# Patient Record
Sex: FEMALE | Race: BLACK OR AFRICAN AMERICAN | NOT HISPANIC OR LATINO | Employment: FULL TIME | URBAN - METROPOLITAN AREA
[De-identification: names, ages, dates, MRNs, and addresses within clinical notes are randomized per-mention and may not be internally consistent; named-entity substitution may affect disease eponyms.]

---

## 2017-02-01 ENCOUNTER — PATIENT MESSAGE (OUTPATIENT)
Dept: FAMILY MEDICINE | Facility: CLINIC | Age: 22
End: 2017-02-01

## 2017-02-07 ENCOUNTER — DOCUMENTATION ONLY (OUTPATIENT)
Dept: FAMILY MEDICINE | Facility: CLINIC | Age: 22
End: 2017-02-07

## 2017-02-07 NOTE — PROGRESS NOTES
Pre-Visit Chart Review  For Appointment Scheduled on 2/8/17    Health Maintenance Due   Topic Date Due    Lipid Panel  1996    HPV Vaccines (1 of 3 - Female 3 Dose Series) 09/06/2007    Influenza Vaccine  08/01/2016

## 2017-02-08 ENCOUNTER — OFFICE VISIT (OUTPATIENT)
Dept: FAMILY MEDICINE | Facility: CLINIC | Age: 22
End: 2017-02-08
Payer: COMMERCIAL

## 2017-02-08 VITALS
SYSTOLIC BLOOD PRESSURE: 107 MMHG | HEART RATE: 102 BPM | DIASTOLIC BLOOD PRESSURE: 67 MMHG | WEIGHT: 178.38 LBS | OXYGEN SATURATION: 98 % | HEIGHT: 65 IN | TEMPERATURE: 99 F | BODY MASS INDEX: 29.72 KG/M2

## 2017-02-08 DIAGNOSIS — R51.9 FREQUENT HEADACHES: ICD-10-CM

## 2017-02-08 DIAGNOSIS — F32.A DEPRESSION, UNSPECIFIED DEPRESSION TYPE: Primary | ICD-10-CM

## 2017-02-08 PROCEDURE — 99999 PR PBB SHADOW E&M-EST. PATIENT-LVL III: CPT | Mod: PBBFAC,,, | Performed by: PHYSICIAN ASSISTANT

## 2017-02-08 PROCEDURE — 99213 OFFICE O/P EST LOW 20 MIN: CPT | Mod: S$GLB,,, | Performed by: PHYSICIAN ASSISTANT

## 2017-02-08 RX ORDER — IBUPROFEN 800 MG/1
800 TABLET ORAL DAILY PRN
Qty: 30 TABLET | Refills: 0 | Status: ON HOLD | OUTPATIENT
Start: 2017-02-08 | End: 2019-08-20

## 2017-02-08 RX ORDER — SERTRALINE HYDROCHLORIDE 50 MG/1
50 TABLET, FILM COATED ORAL DAILY
Refills: 0 | COMMUNITY
Start: 2016-11-23 | End: 2017-02-08 | Stop reason: ALTCHOICE

## 2017-02-08 RX ORDER — BUPROPION HYDROCHLORIDE 150 MG/1
150 TABLET ORAL DAILY
Qty: 30 TABLET | Refills: 5 | Status: SHIPPED | OUTPATIENT
Start: 2017-02-08 | End: 2018-04-10

## 2017-02-08 NOTE — PROGRESS NOTES
Subjective:       Patient ID: Albina Beckham is a 21 y.o. female.    Chief Complaint: medication concern    HPI   Patient is a 21 year old AA female presenting to the clinic for f/u depression. Patient reports going to [facility] ER 11/2016 with concern of depression after breaking up with her boyfriend. She reports since she told the nurse she had a history of drug use, she was PEC'd to inpatient behavoiral at [facility]. She was placed on sertraline 50mg daily for depression. She was discharged in stable condition. She feels as though the sertraline 50mg is causing her to have insomnia. She is inquiring about switching to something else. She does feel like it helped her emotions & irritability. She also admits to headaches approximately 2x weekly occurring over temporal area either right or left sided. Usually lasting approximatly 1 hours & relieved with ibuprofen 800. She denies any blurry vision, chest pain, vomiting, sinus congestion associated with the headaches. She reports history of migraines as a child that were much worse. Her mother also has migraines.  Review of Systems   Constitutional: Positive for fatigue. Negative for activity change, appetite change, chills, diaphoresis and fever.   HENT: Negative for congestion, postnasal drip and rhinorrhea.    Respiratory: Negative.  Negative for cough, shortness of breath and wheezing.    Cardiovascular: Negative.  Negative for chest pain.   Gastrointestinal: Negative for abdominal pain, blood in stool, constipation, diarrhea, nausea and vomiting.   Genitourinary: Negative for dysuria, frequency, hematuria and urgency.   Musculoskeletal: Negative.    Skin: Negative.  Negative for color change and rash.   Neurological: Positive for headaches. Negative for dizziness and syncope.   Psychiatric/Behavioral: Negative for agitation, behavioral problems, confusion, decreased concentration, dysphoric mood and suicidal ideas.       Objective:      Physical  Exam   Constitutional: Vital signs are normal. She appears well-developed and well-nourished. No distress.   Cardiovascular: Normal rate, regular rhythm, S1 normal, S2 normal and normal heart sounds.  Exam reveals no gallop.    No murmur heard.  Pulses:       Radial pulses are 2+ on the right side, and 2+ on the left side.   <2sec cap refill fingers bilat     Pulmonary/Chest: Effort normal and breath sounds normal. No respiratory distress. She has no wheezes. She has no rhonchi.   Skin: Skin is warm and dry. She is not diaphoretic.   Appropriate skin turgor   Psychiatric: She has a normal mood and affect. Her speech is normal and behavior is normal. Judgment and thought content normal. Cognition and memory are normal.       Assessment:       1. Depression, unspecified depression type    2. Frequent headaches        Plan:   Albina was seen today for medication concern.    Diagnoses and all orders for this visit:    Depression, unspecified depression type  Sertraline more related to insomnia, but still wants to change  Wean off zoloft- take 25mg x 4 days; then start wellbutrin xl 150mg daily  -     buPROPion (WELLBUTRIN XL) 150 MG TB24 tablet; Take 1 tablet (150 mg total) by mouth once daily.  3-4 week f/u scheduled    Frequent headaches  Waxing and wnaing; patient alerted to let us know if symptoms worsen, new symptoms develop  -     ibuprofen (ADVIL,MOTRIN) 800 MG tablet; Take 1 tablet (800 mg total) by mouth daily as needed for Pain (headaches).  Return to clinic if symptoms worsen or do not improve with treatment

## 2017-02-08 NOTE — MR AVS SNAPSHOT
[facility]  [address] [place] [address]  Phone: [phone]  Fax: [phone]                  Albina Beckham   2017 4:40 PM   Office Visit    Description:  Female : 1995   Provider:  SANFORD Cruz   Department:  [facility]           Reason for Visit     medication concern           Diagnoses this Visit        Comments    Depression, unspecified depression type    -  Primary            To Do List           Future Appointments        Provider Department Dept Phone    2017 7:30 AM Wisam Lou MD [facility] [phone]      Goals (5 Years of Data)     None      Follow-Up and Disposition     Return for 3 week f/u depression.       These Medications        Disp Refills Start End    buPROPion (WELLBUTRIN XL) 150 MG TB24 tablet 30 tablet 5 2017    Take 1 tablet (150 mg total) by mouth once daily. - Oral    Pharmacy: [facility]/pharmacy #[id number] - [place] - [address]  Ph #: [phone]       ibuprofen (ADVIL,MOTRIN) 800 MG tablet 30 tablet 0 2017     Take 1 tablet (800 mg total) by mouth daily as needed for Pain (headaches). - Oral    Pharmacy: [facility]/pharmacy #[id number] - [place] - [address]  Ph #: [phone]         [facility]     [facility] On Call Nurse Care Line -  Assistance  Registered nurses in the [facility] On Call Center provide clinical advisement, health education, appointment booking, and other advisory services.  Call for this free service at [phone].             Medications           Message regarding Medications     Verify the changes and/or additions to your medication regime listed below are the same as discussed with your clinician today.  If any of these changes or additions are incorrect, please notify your healthcare provider.        START taking these NEW medications        Refills    buPROPion (WELLBUTRIN XL) 150 MG TB24 tablet 5    Sig: Take 1 tablet (150 mg total) by  "mouth once daily.    Class: Normal    Route: Oral    ibuprofen (ADVIL,MOTRIN) 800 MG tablet 0    Sig: Take 1 tablet (800 mg total) by mouth daily as needed for Pain (headaches).    Class: Normal    Route: Oral      STOP taking these medications     trazodone (DESYREL) 50 MG tablet Take 1 tablet (50 mg total) by mouth nightly as needed for Insomnia.    sulindac (CLINORIL) 200 MG Tab TAKE 1 TABLET BY MOUTH TWICE A DAY AFTER MEALS AS NEEDED    butalbital-acetaminophen-caffeine -40 mg (FIORICET, ESGIC) -40 mg per tablet     sertraline (ZOLOFT) 50 MG tablet Take 50 mg by mouth once daily.    predniSONE (DELTASONE) 20 MG tablet            Verify that the below list of medications is an accurate representation of the medications you are currently taking.  If none reported, the list may be blank. If incorrect, please contact your healthcare provider. Carry this list with you in case of emergency.           Current Medications     epinephrine (EPIPEN 2-KIMANI) 0.3 mg/0.3 mL AtIn Inject Epipen as directed.    hydroxychloroquine (PLAQUENIL) 200 mg tablet Take 200 mg by mouth once daily.    norethindrone-ethinyl estradiol (MICROGESTIN 1/20) 1-20 mg-mcg per tablet Take 1 tablet by mouth once daily.    tramadol (ULTRAM) 50 mg tablet Take 50 mg by mouth every 4 (four) hours as needed. for pain.    buPROPion (WELLBUTRIN XL) 150 MG TB24 tablet Take 1 tablet (150 mg total) by mouth once daily.    ibuprofen (ADVIL,MOTRIN) 800 MG tablet Take 1 tablet (800 mg total) by mouth daily as needed for Pain (headaches).           Clinical Reference Information           Your Vitals Were     BP Pulse Temp Height Weight SpO2    107/67 (BP Location: Right arm, Patient Position: Sitting, BP Method: Automatic) 102 98.7 °F (37.1 °C) (Oral) 5' 5" (1.651 m) 80.9 kg (178 lb 5.6 oz) 98%    BMI                29.68 kg/m2          Blood Pressure          Most Recent Value    BP  107/67      Allergies as of 2/8/2017     Ciprofloxacin    "   Immunizations Administered on Date of Encounter - 2/8/2017     None      Language Assistance Services     ATTENTION: Language assistance services are available, free of charge. Please call [phone].      ATENCIÓN: Si habjb cardoza, tiene a schmitt disposición servicios gratuitos de asistencia lingüística. Llame al [phone].     CHÚ Ý: N?u b?n nói Ti?ng Vi?t, có các d?ch v? h? tr? ngôn ng? mi?n phí dành cho b?n. G?i s? [phone].         [facility] complies with applicable Federal civil rights laws and does not discriminate on the basis of race, color, national origin, age, disability, or sex.

## 2017-02-10 ENCOUNTER — PATIENT MESSAGE (OUTPATIENT)
Dept: FAMILY MEDICINE | Facility: CLINIC | Age: 22
End: 2017-02-10

## 2017-02-12 DIAGNOSIS — G43.709 CHRONIC MIGRAINE WITHOUT AURA WITHOUT STATUS MIGRAINOSUS, NOT INTRACTABLE: Primary | ICD-10-CM

## 2017-02-19 ENCOUNTER — PATIENT MESSAGE (OUTPATIENT)
Dept: FAMILY MEDICINE | Facility: CLINIC | Age: 22
End: 2017-02-19

## 2017-02-20 ENCOUNTER — PATIENT MESSAGE (OUTPATIENT)
Dept: FAMILY MEDICINE | Facility: CLINIC | Age: 22
End: 2017-02-20

## 2017-02-20 NOTE — TELEPHONE ENCOUNTER
Please make patient ER f/u apt & inform her that she cannot go to the gym at this time.     Also, we will need [facility] records.

## 2017-02-20 NOTE — TELEPHONE ENCOUNTER
My chart message to pt. Pt already scheduled to see Ms. Sonja PA-C on 3/1/17. Called for [facility] records. Thanks, Julisa

## 2017-02-21 NOTE — TELEPHONE ENCOUNTER
Please inform patient I do not have any spots open Wednesday. She can see another provider if she needs to be seen that day. It would be an ER follow-up.

## 2017-02-24 ENCOUNTER — DOCUMENTATION ONLY (OUTPATIENT)
Dept: FAMILY MEDICINE | Facility: CLINIC | Age: 22
End: 2017-02-24

## 2017-02-24 NOTE — PROGRESS NOTES
Pre-Visit Chart Review  For Appointment Scheduled on 3/1/17      Health Maintenance Due   Topic Date Due    Lipid Panel  1996    HPV Vaccines (1 of 3 - Female 3 Dose Series) 09/06/2007    Influenza Vaccine  08/01/2016

## 2017-03-01 ENCOUNTER — OFFICE VISIT (OUTPATIENT)
Dept: FAMILY MEDICINE | Facility: CLINIC | Age: 22
End: 2017-03-01
Payer: COMMERCIAL

## 2017-03-01 ENCOUNTER — LAB VISIT (OUTPATIENT)
Dept: LAB | Facility: HOSPITAL | Age: 22
End: 2017-03-01
Attending: FAMILY MEDICINE
Payer: COMMERCIAL

## 2017-03-01 VITALS
SYSTOLIC BLOOD PRESSURE: 119 MMHG | DIASTOLIC BLOOD PRESSURE: 79 MMHG | HEIGHT: 65 IN | WEIGHT: 177.25 LBS | BODY MASS INDEX: 29.53 KG/M2 | HEART RATE: 80 BPM | TEMPERATURE: 98 F

## 2017-03-01 DIAGNOSIS — B27.90 MONONUCLEOSIS: Primary | ICD-10-CM

## 2017-03-01 DIAGNOSIS — R21 RASH: ICD-10-CM

## 2017-03-01 DIAGNOSIS — B27.90 MONONUCLEOSIS: ICD-10-CM

## 2017-03-01 LAB
ALBUMIN SERPL BCP-MCNC: 3 G/DL
ALP SERPL-CCNC: 88 U/L
ALT SERPL W/O P-5'-P-CCNC: 67 U/L
ANION GAP SERPL CALC-SCNC: 9 MMOL/L
AST SERPL-CCNC: 52 U/L
BASOPHILS # BLD AUTO: 0.07 K/UL
BASOPHILS NFR BLD: 1.4 %
BILIRUB SERPL-MCNC: 0.2 MG/DL
BUN SERPL-MCNC: 8 MG/DL
CALCIUM SERPL-MCNC: 9 MG/DL
CHLORIDE SERPL-SCNC: 103 MMOL/L
CO2 SERPL-SCNC: 22 MMOL/L
CREAT SERPL-MCNC: 0.7 MG/DL
DIFFERENTIAL METHOD: ABNORMAL
EOSINOPHIL # BLD AUTO: 0 K/UL
EOSINOPHIL NFR BLD: 0 %
ERYTHROCYTE [DISTWIDTH] IN BLOOD BY AUTOMATED COUNT: 13 %
EST. GFR  (AFRICAN AMERICAN): >60 ML/MIN/1.73 M^2
EST. GFR  (NON AFRICAN AMERICAN): >60 ML/MIN/1.73 M^2
GLUCOSE SERPL-MCNC: 79 MG/DL
HCT VFR BLD AUTO: 37.8 %
HGB BLD-MCNC: 12.6 G/DL
LYMPHOCYTES # BLD AUTO: 4.2 K/UL
LYMPHOCYTES NFR BLD: 81.2 %
MCH RBC QN AUTO: 28.4 PG
MCHC RBC AUTO-ENTMCNC: 33.3 %
MCV RBC AUTO: 85 FL
MONOCYTES # BLD AUTO: 0.5 K/UL
MONOCYTES NFR BLD: 8.9 %
NEUTROPHILS # BLD AUTO: 0.4 K/UL
NEUTROPHILS NFR BLD: 8.5 %
PLATELET # BLD AUTO: 329 K/UL
PMV BLD AUTO: 9.7 FL
POTASSIUM SERPL-SCNC: 3.9 MMOL/L
PROT SERPL-MCNC: 9.1 G/DL
RBC # BLD AUTO: 4.43 M/UL
SODIUM SERPL-SCNC: 134 MMOL/L
WBC # BLD AUTO: 5.17 K/UL

## 2017-03-01 PROCEDURE — 99213 OFFICE O/P EST LOW 20 MIN: CPT | Mod: 25,S$GLB,, | Performed by: PHYSICIAN ASSISTANT

## 2017-03-01 PROCEDURE — 85025 COMPLETE CBC W/AUTO DIFF WBC: CPT

## 2017-03-01 PROCEDURE — 99999 PR PBB SHADOW E&M-EST. PATIENT-LVL III: CPT | Mod: PBBFAC,,, | Performed by: PHYSICIAN ASSISTANT

## 2017-03-01 PROCEDURE — 96372 THER/PROPH/DIAG INJ SC/IM: CPT | Mod: S$GLB,,, | Performed by: FAMILY MEDICINE

## 2017-03-01 PROCEDURE — 80053 COMPREHEN METABOLIC PANEL: CPT

## 2017-03-01 PROCEDURE — 1160F RVW MEDS BY RX/DR IN RCRD: CPT | Mod: S$GLB,,, | Performed by: PHYSICIAN ASSISTANT

## 2017-03-01 PROCEDURE — 86665 EPSTEIN-BARR CAPSID VCA: CPT

## 2017-03-01 PROCEDURE — 36415 COLL VENOUS BLD VENIPUNCTURE: CPT | Mod: PO

## 2017-03-01 RX ORDER — BETAMETHASONE SODIUM PHOSPHATE AND BETAMETHASONE ACETATE 3; 3 MG/ML; MG/ML
6 INJECTION, SUSPENSION INTRA-ARTICULAR; INTRALESIONAL; INTRAMUSCULAR; SOFT TISSUE
Status: COMPLETED | OUTPATIENT
Start: 2017-03-01 | End: 2017-03-01

## 2017-03-01 RX ADMIN — BETAMETHASONE SODIUM PHOSPHATE AND BETAMETHASONE ACETATE 6 MG: 3; 3 INJECTION, SUSPENSION INTRA-ARTICULAR; INTRALESIONAL; INTRAMUSCULAR; SOFT TISSUE at 05:03

## 2017-03-01 NOTE — PROGRESS NOTES
2 identifiers, name and , used to confirm patient identity.  Procedure was explained and patient verbalized understanding. Celestone 6 mg given IM in the left upper outer quadrant of the gluteus using sterile technique. Patient tolerated procedure well. No residual bleeding noted at the injection site.

## 2017-03-01 NOTE — MR AVS SNAPSHOT
[facility]  [address] [place] [address]  Phone: [phone]  Fax: [phone]                  Albina Beckham   3/1/2017 4:00 PM   Office Visit    Description:  Female : 1995   Provider:  SANFORD Cruz   Department:  [facility]           Reason for Visit     Mononucleosis           Diagnoses this Visit        Comments    Mononucleosis    -  Primary     Rash                To Do List           Future Appointments        Provider Department Dept Phone    2017 7:30 AM Wisam Lou MD [facility] [phone]      Goals (5 Years of Data)     None      [facility]     [facility] On Call Nurse [facility] Line -  Assistance  Registered nurses in the [facility] On Call Center provide clinical advisement, health education, appointment booking, and other advisory services.  Call for this free service at [phone].             Medications           Message regarding Medications     Verify the changes and/or additions to your medication regime listed below are the same as discussed with your clinician today.  If any of these changes or additions are incorrect, please notify your healthcare provider.        These medications were administered today        Dose Freq    betamethasone acetate-betamethasone sodium phosphate injection 6 mg 6 mg Clinic/HOD 1 time    Sig: Inject 1 mL (6 mg total) into the muscle one time.    Class: Normal    Route: Intramuscular    Cosign for Ordering: Accepted by Cris Motley MD on 3/1/2017  4:30 PM      STOP taking these medications     hydrocodone-acetaminophen 5-325mg (NORCO) 5-325 mg per tablet Take 1 tablet by mouth every 6 (six) hours as needed for Pain.           Verify that the below list of medications is an accurate representation of the medications you are currently taking.  If none reported, the list may be blank. If incorrect, please contact your healthcare provider. Carry this list with you in  case of emergency.           Current Medications     buPROPion (WELLBUTRIN XL) 150 MG TB24 tablet Take 1 tablet (150 mg total) by mouth once daily.    epinephrine (EPIPEN 2-KIMANI) 0.3 mg/0.3 mL AtIn Inject Epipen as directed.    hydroxychloroquine (PLAQUENIL) 200 mg tablet Take 200 mg by mouth once daily.    ibuprofen (ADVIL,MOTRIN) 800 MG tablet Take 1 tablet (800 mg total) by mouth daily as needed for Pain (headaches).    norethindrone-ethinyl estradiol (MICROGESTIN 1/20) 1-20 mg-mcg per tablet Take 1 tablet by mouth once daily.    tramadol (ULTRAM) 50 mg tablet Take 50 mg by mouth every 4 (four) hours as needed. for pain.           Clinical Reference Information           Your Vitals Were     BP                   119/79 (BP Location: Right arm, Patient Position: Sitting, BP Method: Automatic)           Blood Pressure          Most Recent Value    BP  119/79      Allergies as of 3/1/2017     Ciprofloxacin      Immunizations Administered on Date of Encounter - 3/1/2017     None      Orders Placed During Today's Visit     Future Labs/Procedures Expected by Expires    CBC W/ AUTO DIFFERENTIAL  3/1/2017 4/30/2018    Comprehensive metabolic panel  3/1/2017 4/30/2018    Pavithra-Barr Virus antibody panel  3/1/2017 4/30/2018      Language Assistance Services     ATTENTION: Language assistance services are available, free of charge. Please call [phone].      ATENCIÓN: Si habla sony, tiene a schmitt disposición servicios gratuitos de asistencia lingüística. Llame al [phone].     CHÚ Ý: N?u b?n nói Ti?ng Vi?t, có các d?ch v? h? tr? ngôn ng? mi?n phí dành cho b?n. G?i s? [phone].         [facility] complies with applicable Federal civil rights laws and does not discriminate on the basis of race, color, national origin, age, disability, or sex.

## 2017-03-07 NOTE — PROGRESS NOTES
Subjective:       Patient ID: Albina Beckham is a 21 y.o. female.    Chief Complaint: Mononucleosis    HPI      Patient is a 21 year old AA female presenting to the clinic for ER f/u after being diagnosed with mononucleosis. Patient presented to [facility] on 2/15/17 with flu like symptoms. She had been to our ER with similar symptoms- 2/13/17 and diagnosed with sinusitis where she was given Augmentin. Patient reported no improvement in symptoms & persistent headache at [facility]. Other associated symptoms include increased fatigue & palpable cervical lymphadenopathy. Mono spot found to be positive. Patient placed on oral prednisone. WBC slightly elevated at 10.5 with left shift. She also had elevated liver enzymes 94, 95 & alk phos 173. Preg test negative. Normal CXR & CT head. She was told to avoid contact sports, gym until spleen evaluated. She denies any fever, chills, abdominal pain. She feels much improved than previously.  Review of Systems   Constitutional: Positive for activity change, appetite change and fatigue. Negative for chills, diaphoresis and fever.   HENT: Negative for congestion, postnasal drip, rhinorrhea, sore throat, trouble swallowing and voice change.    Respiratory: Negative.  Negative for cough, shortness of breath and wheezing.    Cardiovascular: Negative.  Negative for chest pain.   Gastrointestinal: Negative for abdominal pain, blood in stool, constipation, diarrhea, nausea and vomiting.   Genitourinary: Negative for dysuria, frequency, hematuria and urgency.   Musculoskeletal: Negative.    Skin: Negative.  Negative for color change and rash.   Neurological: Positive for headaches. Negative for dizziness and syncope.   Psychiatric/Behavioral: Negative for agitation, behavioral problems and confusion.       Objective:      Physical Exam   Constitutional: Vital signs are normal. She appears well-developed and well-nourished. No distress.   HENT:   Head: Normocephalic and atraumatic.   Right Ear:  Hearing, tympanic membrane, external ear and ear canal normal.   Left Ear: Hearing, tympanic membrane, external ear and ear canal normal.   Nose: Nose normal.   Mouth/Throat: Uvula is midline and oropharynx is clear and moist.   Cardiovascular: Normal rate, regular rhythm, S1 normal, S2 normal and normal heart sounds.  Exam reveals no gallop.    No murmur heard.  Pulses:       Radial pulses are 2+ on the right side, and 2+ on the left side.   <2sec cap refill fingers bilat     Pulmonary/Chest: Effort normal and breath sounds normal. No respiratory distress. She has no wheezes. She has no rhonchi.   Abdominal: Normal appearance. There is no tenderness. There is no rigidity, no guarding, no tenderness at McBurney's point and negative Joseph's sign.   Skin: Skin is warm and dry. She is not diaphoretic.   Appropriate skin turgor   Psychiatric: She has a normal mood and affect. Her speech is normal and behavior is normal. Judgment and thought content normal. Cognition and memory are normal.       Assessment:       1. Mononucleosis    2. Rash        Plan:   Albina was seen today for mononucleosis.    Diagnoses and all orders for this visit:    Mononucleosis  -     Cancel: US Abdomen Complete; Future  -     Pavithra-Barr Virus antibody panel; Future  -     Comprehensive metabolic panel; Future  -     CBC W/ AUTO DIFFERENTIAL; Future    Rash  -     betamethasone acetate-betamethasone sodium phosphate injection 6 mg; Inject 1 mL (6 mg total) into the muscle one time.

## 2017-03-10 ENCOUNTER — TELEPHONE (OUTPATIENT)
Dept: NEUROLOGY | Facility: CLINIC | Age: 22
End: 2017-03-10

## 2017-03-13 LAB
EBV EA IGG SER QL IF: ABNORMAL TITER
EBV NA IGG SER IA-ACNC: ABNORMAL TITER
EBV VCA IGG SER IA-ACNC: ABNORMAL TITER
EBV VCA IGM SER IA-ACNC: ABNORMAL TITER

## 2017-03-14 ENCOUNTER — PATIENT MESSAGE (OUTPATIENT)
Dept: FAMILY MEDICINE | Facility: CLINIC | Age: 22
End: 2017-03-14

## 2017-03-14 DIAGNOSIS — B27.90 EPSTEIN BARR INFECTION: Primary | ICD-10-CM

## 2017-03-14 DIAGNOSIS — R74.8 ELEVATED LIVER ENZYMES: ICD-10-CM

## 2017-03-15 ENCOUNTER — HISTORICAL (OUTPATIENT)
Dept: ADMINISTRATIVE | Facility: HOSPITAL | Age: 22
End: 2017-03-15

## 2017-03-15 LAB — CRP SERPL-MCNC: 0.11 MG/DL (ref 0–1.4)

## 2017-03-17 LAB
ANA SER-ACNC: NEGATIVE
C3 SERPL-MCNC: 143 MG/DL (ref 82–167)
C4 NEF SERPL-MCNC: 18 MG/DL (ref 14–44)

## 2017-03-18 LAB
CARDIOLIPIN IGA SER IA-ACNC: <9 APL U/ML (ref 0–11)
CARDIOLIPIN IGG SER IA-ACNC: 12 GPL U/ML (ref 0–14)
CARDIOLIPIN IGM SER IA-ACNC: <9 MPL U/ML (ref 0–12)

## 2017-03-19 LAB
B2 GLYCOPROTEIN I IGA: <9 GPI IGA UNITS (ref 0–25)
B2 GLYCOPROTEIN I IGG: <9 GPI IGG UNITS (ref 0–20)
B2 GLYCOPROTEIN I IGM: <9 GPI IGM UNITS (ref 0–32)

## 2017-03-24 ENCOUNTER — DOCUMENTATION ONLY (OUTPATIENT)
Dept: FAMILY MEDICINE | Facility: CLINIC | Age: 22
End: 2017-03-24

## 2017-03-24 NOTE — PROGRESS NOTES
Pre-Visit Chart Review  For Appointment Scheduled on 4/4/17    Health Maintenance Due   Topic Date Due    Lipid Panel  1996    HPV Vaccines (1 of 3 - Female 3 Dose Series) 09/06/2007    Influenza Vaccine  08/01/2016

## 2017-06-13 ENCOUNTER — TELEPHONE (OUTPATIENT)
Dept: FAMILY MEDICINE | Facility: CLINIC | Age: 22
End: 2017-06-13

## 2017-06-13 NOTE — TELEPHONE ENCOUNTER
Advised caller that [facility] does not have any availabilities at this time, but  has several openings. She will call back to schedule.

## 2017-06-13 NOTE — TELEPHONE ENCOUNTER
----- Message from Vinod MOCTEZUMA Frisard sent at 6/13/2017  2:05 PM CDT -----  Contact: Mother/Felipa Leo called in regarding the attached patient (dtrKelly) and wanted to see if patient could be squeezed in this Friday 6/16/17.  Patient has a bad cough & congestion.    Felipa's call back number is [phone]

## 2017-06-13 NOTE — TELEPHONE ENCOUNTER
----- Message from Claudette Cornelius sent at 6/13/2017  8:26 AM CDT -----  Contact: mother, livan   Wants to be seen today   Cough and congestion   Call back

## 2017-06-13 NOTE — TELEPHONE ENCOUNTER
----- Message from Mona Rogers sent at 6/13/2017 11:57 AM CDT -----  Contact: mother Felipa  Patient's mother Felipa is returning a call to [place]    Please call her at    Thanks

## 2017-10-16 ENCOUNTER — OFFICE VISIT (OUTPATIENT)
Dept: ORTHOPEDICS | Facility: CLINIC | Age: 22
End: 2017-10-16
Payer: COMMERCIAL

## 2017-10-16 VITALS — HEIGHT: 64 IN | WEIGHT: 168 LBS | BODY MASS INDEX: 28.68 KG/M2

## 2017-10-16 DIAGNOSIS — M25.561 CHRONIC PAIN OF BOTH KNEES: Primary | ICD-10-CM

## 2017-10-16 DIAGNOSIS — G89.29 CHRONIC PAIN OF BOTH KNEES: Primary | ICD-10-CM

## 2017-10-16 DIAGNOSIS — M25.562 CHRONIC PAIN OF BOTH KNEES: Primary | ICD-10-CM

## 2017-10-16 PROCEDURE — 97760 ORTHOTIC MGMT&TRAING 1ST ENC: CPT | Mod: S$GLB,,, | Performed by: ORTHOPAEDIC SURGERY

## 2017-10-16 PROCEDURE — 99999 PR PBB SHADOW E&M-EST. PATIENT-LVL II: CPT | Mod: PBBFAC,,, | Performed by: ORTHOPAEDIC SURGERY

## 2017-10-16 PROCEDURE — 99213 OFFICE O/P EST LOW 20 MIN: CPT | Mod: 25,S$GLB,, | Performed by: ORTHOPAEDIC SURGERY

## 2017-10-16 NOTE — PROGRESS NOTES
HISTORY OF PRESENT ILLNESS:  Albina Beckham, 22 years old, complaining of   bilateral knee pain for about a week's time.  Started running about a month ago.    Anterior type knee pain.  Again, she has a history of MPFL reconstruction   several years ago.    Exam shows positive patellar crunch.  Negative patellar apprehension.  Skin is   intact.  Compartments are soft.    ASSESSMENT:  Patellofemoral pain.    PLAN:  Quad strengthening and hamstring stretching.  We will get her fitted with   bilateral anterior knee pain type braces.  Follow up as needed.      WALKER/ZAIRA  dd: 10/16/2017 10:13:51 (CDT)  td: 10/16/2017 18:17:58 (CDT)  Doc ID   #[id number]  Job ID #[id number]    CC:     We performed a custom orthotic/brace fitting, adjusting and training with the patient. The patient demonstrated understanding and proper care. This was performed for 15 minutes.

## 2017-11-13 RX ORDER — TRAZODONE HYDROCHLORIDE 50 MG/1
TABLET ORAL
Qty: 30 TABLET | Refills: 0 | Status: SHIPPED | OUTPATIENT
Start: 2017-11-13 | End: 2018-11-07 | Stop reason: ALTCHOICE

## 2017-12-25 ENCOUNTER — PATIENT MESSAGE (OUTPATIENT)
Dept: FAMILY MEDICINE | Facility: CLINIC | Age: 22
End: 2017-12-25

## 2017-12-27 ENCOUNTER — TELEPHONE (OUTPATIENT)
Dept: FAMILY MEDICINE | Facility: CLINIC | Age: 22
End: 2017-12-27

## 2017-12-27 NOTE — TELEPHONE ENCOUNTER
----- Message from Geovanna Rose sent at 12/27/2017 12:58 PM CST -----  Patients mom/Felipa states that patient need to see the doctor/NP today for possibly strep.  Please call mom at [phone].

## 2017-12-28 ENCOUNTER — OFFICE VISIT (OUTPATIENT)
Dept: FAMILY MEDICINE | Facility: CLINIC | Age: 22
End: 2017-12-28
Payer: COMMERCIAL

## 2017-12-28 VITALS
TEMPERATURE: 98 F | WEIGHT: 177.25 LBS | HEIGHT: 64 IN | DIASTOLIC BLOOD PRESSURE: 71 MMHG | RESPIRATION RATE: 18 BRPM | OXYGEN SATURATION: 98 % | SYSTOLIC BLOOD PRESSURE: 118 MMHG | HEART RATE: 85 BPM | BODY MASS INDEX: 30.26 KG/M2

## 2017-12-28 DIAGNOSIS — J03.90 EXUDATIVE TONSILLITIS: Primary | ICD-10-CM

## 2017-12-28 LAB
CTP QC/QA: YES
S PYO RRNA THROAT QL PROBE: NEGATIVE

## 2017-12-28 PROCEDURE — 99999 PR PBB SHADOW E&M-EST. PATIENT-LVL III: CPT | Mod: PBBFAC,,, | Performed by: FAMILY MEDICINE

## 2017-12-28 PROCEDURE — 99214 OFFICE O/P EST MOD 30 MIN: CPT | Mod: S$GLB,,, | Performed by: FAMILY MEDICINE

## 2017-12-28 PROCEDURE — 87880 STREP A ASSAY W/OPTIC: CPT | Mod: QW,S$GLB,, | Performed by: FAMILY MEDICINE

## 2017-12-28 RX ORDER — AZITHROMYCIN 250 MG/1
TABLET, FILM COATED ORAL
Qty: 6 TABLET | Refills: 0 | Status: SHIPPED | OUTPATIENT
Start: 2017-12-28 | End: 2018-03-09 | Stop reason: ALTCHOICE

## 2017-12-28 RX ORDER — DESOGESTREL AND ETHINYL ESTRADIOL 0.15-0.03
1 KIT ORAL DAILY
COMMUNITY
Start: 2017-12-20 | End: 2018-11-07 | Stop reason: ALTCHOICE

## 2017-12-28 RX ORDER — VALACYCLOVIR HYDROCHLORIDE 1 G/1
1 TABLET, FILM COATED ORAL DAILY PRN
COMMUNITY
Start: 2017-10-07 | End: 2019-08-08

## 2017-12-28 NOTE — PROGRESS NOTES
Subjective:       Patient ID: Albina Beckham is a 22 y.o. female.    Chief Complaint: Sore Throat (started 12/24/17 with sore throat, post nasal drip)    Has had 4 episodes of Strep over past 2 years.      Sore Throat    This is a new problem. Episode onset: 4 days. The problem has been waxing and waning. Neither side of throat is experiencing more pain than the other. The pain is moderate. Associated symptoms include ear pain. Pertinent negatives include no abdominal pain or shortness of breath. She has tried acetaminophen for the symptoms. The treatment provided mild relief.     Review of Systems   Constitutional: Negative for fever.   HENT: Positive for ear pain and sore throat.    Respiratory: Negative for shortness of breath.    Cardiovascular: Negative for chest pain.   Gastrointestinal: Negative for abdominal pain and nausea.   Skin: Negative for rash.   All other systems reviewed and are negative.      Objective:      Physical Exam   Constitutional: She appears well-developed. No distress.   HENT:   Right Ear: Tympanic membrane is not erythematous.   Left Ear: Tympanic membrane is not erythematous.   Nose: Mucosal edema present. Right sinus exhibits no maxillary sinus tenderness. Left sinus exhibits no maxillary sinus tenderness.   Mouth/Throat: Oropharyngeal exudate and posterior oropharyngeal erythema present. Tonsils are 2+ on the right. Tonsils are 2+ on the left. Tonsillar exudate.   Neck: Neck supple.   Cardiovascular: Normal rate and regular rhythm.    No murmur heard.  Pulmonary/Chest: Effort normal and breath sounds normal.   Lymphadenopathy:     She has cervical adenopathy.       Assessment:       1. Exudative tonsillitis        Plan:         Albina was seen today for sore throat.    Diagnoses and all orders for this visit:    Exudative tonsillitis  -     POCT Rapid Strep A    Other orders  -     azithromycin (Z-KIMANI) 250 MG tablet; As directed on pack

## 2018-02-07 ENCOUNTER — TELEPHONE (OUTPATIENT)
Dept: FAMILY MEDICINE | Facility: CLINIC | Age: 23
End: 2018-02-07

## 2018-02-07 NOTE — TELEPHONE ENCOUNTER
Is she having any fevers? Body aches?     If so, I will send in tamiflu & she will need rest, hydration. She can alterate tylenol and ibuprofen for fevers. She can take OTC cough/cold medication for symptoms.    If none of the above, she needs to be seen.

## 2018-02-07 NOTE — TELEPHONE ENCOUNTER
----- Message from Alisson Jackson sent at 2/7/2018 11:01 AM CST -----  Contact: Mother Felipa Moise  Patients mother is requesting same day appointment tomorrow.  Patient is coughing, sore throat, achy, nasal congestion,  all around not feeling well.  Please call back [phone].  Thank you!

## 2018-02-07 NOTE — TELEPHONE ENCOUNTER
Patient called with flu like symptoms.  Headache, body ache, sore throat and congested  Please advise

## 2018-02-08 ENCOUNTER — TELEPHONE (OUTPATIENT)
Dept: FAMILY MEDICINE | Facility: CLINIC | Age: 23
End: 2018-02-08

## 2018-02-08 NOTE — TELEPHONE ENCOUNTER
----- Message from Radha Bartlett sent at 2/8/2018  1:01 PM CST -----  Contact: mother, Felipa Beckham  Patient needs first available appointment due to head, nose throat and cough. Patient would like to come in on Friday after 2:00. Please call patient's mother, Felipa Beckham at [phone]. Thanks!

## 2018-02-08 NOTE — TELEPHONE ENCOUNTER
Patient's mom states she had to cancel her appt today to go into work.  Advised Yamil or Samantha Banegas, patient's mom refused.  She states she will go to urgent care

## 2018-02-22 ENCOUNTER — OFFICE VISIT (OUTPATIENT)
Dept: FAMILY MEDICINE | Facility: CLINIC | Age: 23
End: 2018-02-22
Payer: COMMERCIAL

## 2018-02-22 VITALS
HEART RATE: 76 BPM | WEIGHT: 185.19 LBS | TEMPERATURE: 98 F | BODY MASS INDEX: 31.62 KG/M2 | SYSTOLIC BLOOD PRESSURE: 110 MMHG | DIASTOLIC BLOOD PRESSURE: 73 MMHG | HEIGHT: 64 IN

## 2018-02-22 DIAGNOSIS — B37.2 MONILIASIS, CUTANEOUS: Primary | ICD-10-CM

## 2018-02-22 DIAGNOSIS — R63.5 ABNORMAL WEIGHT GAIN: ICD-10-CM

## 2018-02-22 PROCEDURE — 96372 THER/PROPH/DIAG INJ SC/IM: CPT | Mod: S$GLB,,, | Performed by: FAMILY MEDICINE

## 2018-02-22 PROCEDURE — 99999 PR PBB SHADOW E&M-EST. PATIENT-LVL III: CPT | Mod: PBBFAC,,, | Performed by: FAMILY MEDICINE

## 2018-02-22 PROCEDURE — 3008F BODY MASS INDEX DOCD: CPT | Mod: S$GLB,,, | Performed by: FAMILY MEDICINE

## 2018-02-22 PROCEDURE — 99214 OFFICE O/P EST MOD 30 MIN: CPT | Mod: 25,S$GLB,, | Performed by: FAMILY MEDICINE

## 2018-02-22 RX ORDER — CEFTRIAXONE 500 MG/1
500 INJECTION, POWDER, FOR SOLUTION INTRAMUSCULAR; INTRAVENOUS
Status: COMPLETED | OUTPATIENT
Start: 2018-02-22 | End: 2018-02-22

## 2018-02-22 RX ORDER — GABAPENTIN 100 MG/1
100 CAPSULE ORAL 3 TIMES DAILY
COMMUNITY
End: 2018-03-09 | Stop reason: ALTCHOICE

## 2018-02-22 RX ORDER — NYSTATIN AND TRIAMCINOLONE ACETONIDE 100000; 1 [USP'U]/G; MG/G
OINTMENT TOPICAL 2 TIMES DAILY
Qty: 60 G | Refills: 3 | Status: SHIPPED | OUTPATIENT
Start: 2018-02-22 | End: 2018-04-10

## 2018-02-22 RX ORDER — FLUCONAZOLE 100 MG/1
100 TABLET ORAL DAILY
Qty: 10 TABLET | Refills: 0 | Status: SHIPPED | OUTPATIENT
Start: 2018-02-22 | End: 2018-03-09 | Stop reason: ALTCHOICE

## 2018-02-22 RX ADMIN — CEFTRIAXONE 500 MG: 500 INJECTION, POWDER, FOR SOLUTION INTRAMUSCULAR; INTRAVENOUS at 05:02

## 2018-02-22 NOTE — PROGRESS NOTES
Subjective:       Patient ID: Albina Beckham is a 22 y.o. female.    Chief Complaint: Herpes Zoster    Rash   This is a new problem. The current episode started in the past 7 days. The problem has been gradually worsening since onset. The affected locations include the torso. The rash is characterized by blistering, burning, itchiness, pain, redness and scaling. She was exposed to nothing. Pertinent negatives include no anorexia, congestion, cough, diarrhea, facial edema, fatigue, fever, rhinorrhea or shortness of breath. Past treatments include antibiotics and anti-itch cream. The treatment provided no relief.     Review of Systems   Constitutional: Positive for unexpected weight change. Negative for fatigue and fever.        30 pounds increase.   HENT: Negative for congestion and rhinorrhea.    Respiratory: Negative for cough and shortness of breath.    Gastrointestinal: Negative for anorexia and diarrhea.   Genitourinary: Positive for frequency.        Nocturia.   Musculoskeletal: Positive for arthralgias, back pain and gait problem.   Skin: Positive for rash.   Psychiatric/Behavioral: Positive for behavioral problems.       There is no problem list on file for this patient.      Objective:      Physical Exam   Constitutional: She is oriented to person, place, and time. She appears well-developed and well-nourished.   Cardiovascular: Normal rate, regular rhythm and normal heart sounds.    Pulmonary/Chest: Effort normal and breath sounds normal. Right breast exhibits skin change.       Skin with erythema,wet, and moderate tenderness, Milk curds.   Genitourinary: There is breast tenderness.   Musculoskeletal: She exhibits no edema.   Neurological: She is alert and oriented to person, place, and time.   Skin: Skin is warm and dry. Rash noted. There is erythema.   Psychiatric: Her speech is normal. Judgment and thought content normal. Her mood appears anxious. She is agitated. Cognition and memory are normal.    Nursing note and vitals reviewed.      Lab Results   Component Value Date    WBC 5.17 03/01/2017    HGB 12.6 03/01/2017    HCT 37.8 03/01/2017     03/01/2017    ALT 67 (H) 03/01/2017    AST 52 (H) 03/01/2017     (L) 03/01/2017    K 3.9 03/01/2017     03/01/2017    CREATININE 0.7 03/01/2017    BUN 8 03/01/2017    CO2 22 (L) 03/01/2017     The ASCVD Risk score (Mike WILKERSNO Jr., et al., 2013) failed to calculate for the following reasons:    The 2013 ASCVD risk score is only valid for ages 40 to 79  Labs in Tue . Please evaluate for POC's, binge disorder, and hypometabolism. She may need Ideal protein and Metformin.  Assessment:       1. Moniliasis, cutaneous    2. Abnormal weight gain        Plan:       Moniliasis, cutaneous  -     fluconazole (DIFLUCAN) 100 MG tablet; Take 1 tablet (100 mg total) by mouth once daily.  Dispense: 10 tablet; Refill: 0  -     nystatin-triamcinolone (MYCOLOG) ointment; Apply topically 2 (two) times daily.  Dispense: 60 g; Refill: 3  -     Basic metabolic panel; Future; Expected date: 02/22/2018  -     cefTRIAXone injection 500 mg; Inject 0.5 g (500 mg total) into the muscle one time.    Abnormal weight gain  -     Basic metabolic panel; Future; Expected date: 02/22/2018  -     Hemoglobin A1c; Future; Expected date: 02/22/2018  -     TSH; Future; Expected date: 02/22/2018  -     Lipid panel; Future; Expected date: 02/22/2018    30-minute visit. 20 minutes spent counseling patient on diet, exercise, and weight loss.

## 2018-02-22 NOTE — PATIENT INSTRUCTIONS
When Your Teenager Has Polycystic Ovary Syndrome (PCOS)     A hormone imbalance can cause small, insignificant cysts to form in the ovaries.   Your daughter has been diagnosed with a condition called polycystic ovary syndrome (PCOS). PCOS is an imbalance of hormones. It affects the ovaries.  The ovaries are the organs that store a womans eggs. PCOS can also affect the rest of the body. PCOS can lead to serious health issues if not treated. Treatment cant cure the problem, but it helps reduce symptoms and prevent health problems.  What is PCOS?  Androgens are a type of hormone (chemical messenger in the body). They are often called male hormones, but women make and use some of these hormones also. Girls and women with PCOS usually have higher levels of androgens than normal. This can lead to changes in the body that include:  · Polycystic-appearing ovaries with many small, insignificant cysts   · Missed periods, irregular periods, or very light periods  · Increased body hair growth  · Weight gain  · Acne, oily skin, and dandruff  · Infertility  · Thickened or darkened skin patches     Medicines can help manage symptoms of PCOS.   Because of the hormone changes, women with PCOS are more likely to develop certain serious health problems. These include type 2 diabetes, high blood pressure, problems with the heart and blood vessels, and uterine cancer. Women with PCOS often have problems with their fertility. This is the ability to get pregnant.  What causes PCOS?  A girl may be more likely to get it if a parent or sibling has the condition. But the exact cause of PCOS is not known.  How is PCOS diagnosed?  There is no 1 test that can diagnose PCOS. A medical history, physical exam, and blood tests help give the diagnosis. A pelvic exam may be done. Other tests, such as a vaginal or pelvic ultrasound, may also be done.  How is PCOS treated?  Medicine is the most common treatment for PCOS. Medicines affect the  bodys hormone levels. The most common medicines used to treat PCOS include:  · Birth control pills (oral contraceptives). These contain a combination of female hormones. They can help bring hormones back into balance and reduce or eliminate symptoms. This reduces the risk for endometrial cancer later in life. (A teen does not have to be sexually active to take birth control pills.)  · Insulin-sensitizing medicines. These are used to treat diabetes and are frequently used in the treatment of PCOS. These medicines help the body respond to insulin. In women with PCOS, they can help decrease androgen levels and improve ovulation. Restoring ovulation helps make menstrual periods regular and more predictable.  · Metformin. This is a diabetes medicine that has been shown to help with PCOS symptoms.  · Antiandrogens. These are medicines that can help reverse the effects of male hormones. They help reduce hair growth and clear acne. They are often combined with birth control pills.  In addition to medicine, regular exercise and healthy eating can help manage PCOS. PCOS makes losing weight much harder. But losing even a little weight can help reduce some PCOS symptoms. Talk to your childs health care provider for more information on weight loss and PCOS.  Working with the health care provider  Since there is no cure for PCOS, its important to manage your childs condition. Keep in touch with your childs health care provider. Be honest with the health care provider about how the treatment is going and how your daughter is responding. Mention if you notice any new changes. And take your daughter for regular follow-up visits to ensure that any health problems are caught and managed.  Resources  · Polycystic Ovarian Syndrome Associationwww.pcosupport.org  · Girls Health.govwww.girlshealth.gov/parents/parentsbody/pcos_educators.cfm  · The Hormone Foundationwww.hormone.org/public/polycystic.cfm  · [facility] Womens  Healthwww.LookStat.OurStory   Date Last Reviewed: 5/9/2015  © 2589-2722 [employer]. [address] [place] [address], [place] [address]. All rights reserved. This information is not intended as a substitute for professional medical care. Always follow your healthcare professional's instructions.        Candida Skin Infection (Adult)  Candida is type of yeast. It grows naturally on the skin and in the mouth. If it grows out of control, it can cause an infection. Candida can cause infections in the genital area, skin folds, in the mouth, and under the breasts. Anyone can get this infection. It is more common in a person with a weak immune system, such as from diabetes, HIV, or cancer. Its also more common in someone who has been on antibiotic therapy. And its more common people who are overweight or who have incontinence. Wearing tight-fitting clothing and taking part in activities with lots of skin-to-skin contact can also put you at risk.  Candida causes the skin to become bright red and inflamed. The border of the infected part of the skin is often raised. The infection causes pain and itching. Sometimes the skin peels and bleeds. In the mouth, candida is called thrush, and may cause white thickened areas.  A Candida rash is most often treated with an antifungal cream or ointment. The rash will clear a few days after starting the medicine. Infections that dont go away may need a prescription medicine. In rare cases, a bacterial infection can also occur.  Home care  Your healthcare provider will recommend an antifungal cream or ointment for the rash. He or she may also prescribe a medicine for the itch. Follow all instructions for using these medicines. Dont use cornstarch powder. Cornstarch can cause the Candida infection to get worse.  General care:  · Keep your skin clean by washing the area twice a day.  · Use the cream as directed until your rash is gone. Once the skin has healed, keep it dry to  prevent another infection.   · If you are overweight, talk with your healthcare provider about a plan to lose excess weight.  · Avoid clothes that fit tightly.  Follow-up care  Follow up with your healthcare provider, or as advised. Your rash will clear in 7 to 14 days. Call your healthcare provider if the rash is not gone after 14 days.  When to seek medical advice  Call your healthcare provider right away if any of these occur:  · Pain or redness that gets worse or spreads  · Fluid coming from the skin  · Yellow crusts on the skin  · Fever of 100.4°F (38°C) or higher, or as directed by your healthcare provider  Date Last Reviewed: 9/1/2016 © 2000-2017 [employer]. [address] [place] [address], [place] [address]. All rights reserved. This information is not intended as a substitute for professional medical care. Always follow your healthcare professional's instructions.        Eating a Vegetarian Diet  A vegetarian diet is based on plant foods. It includes fruits, vegetables, beans, grains, seeds, and nuts. Some vegetarians also eat dairy foods and eggs. There are three common vegetarian diets:  · Lacto-ovo vegetarians eat eggs, yogurt, cheese, and other milk products, as well as plant foods.  · Lacto vegetarians eat dairy and plant foods but not eggs.  · Vegans eat only plant foods.  Why eat vegetarian?  People choose to be vegetarians for health, cultural, social, and Moravian reasons. A vegetarian diet is a healthy way to eat. You just have to plan your meals carefully so that you get all the nutrients you need. Most vegetarian diets are high in fiber and low in fat and cholesterol. That means eating vegetarian can:  · Lower your risk of heart disease.  · Lower your blood pressure and cholesterol levels.  · Help you maintain a healthy weight.  · Decrease digestive problems including:  ¨ Bowel diseases  ¨ Gallstones  ¨ Colon cancer  Vegetarian basics  A vegetarian diet can be a healthy way to eat for  people of all ages. But meals and snacks must be planned to include non-meat sources of protein, vitamins, and other nutrients. (See the chart below.) Here are some guidelines for healthy meal planning:  · Eat a wide range of foods. This will help you get all the nutrients you need.  · Eat a number of plant proteins throughout the day.  · Plan for enough calories each day. Also make sure that your calories come from foods that are rich in protein, vitamins, and minerals.  · If you eat dairy foods, choose low-fat or fat-free milk, yogurt, or cheese.  Do you need supplements?  A vegetarian diet can easily supply all the calories, protein, vitamins, and minerals that a person needs. But some people have special needs. They may include children and teens, pregnant and lactating women, women past midlife, the elderly, and vegans. If you are in one of these groups, you may need extra calories, protein, calcium, iron, vitamin B12, or zinc. The lists below can help you choose foods that are good sources of these nutrients. And be sure to ask your health care provider about taking vitamin supplements.  Protein  · Dried beans, soybeans, and lentils  · Tofu (bean curd) and tempeh (cultured soybeans)  · Rice, barley, and other whole grains  · Nuts and nut butter  · Milk, yogurt, and cheese  · Eggs Vitamin B12  · Milk, yogurt, and cheese  · Eggs  · Fortified soy burgers  · Fortified soy milk or other nondairy milk  · Fortified cereals  · Nutritional yeast   Zinc  · Milk, yogurt, and cheese  · Eggs  · Canned or dried beans  · Lentils and split peas  · Wheat germ  · Whole-grain breads and cereals  · Nuts and nut butters  · Pumpkin and sunflower seeds Calcium  · Milk, yogurt, and cheese  · Fortified soy milk or other nondairy milk  · Tofu processed with calcium sulfate  · Leafy, dark-green vegetables  · Dried figs  · Fortified orange juice and fortified cereals  · Sesame seeds  · Beans   Iron  · Wheat germ  · Dried fruits  · Nuts  and seeds  · Whole grain and fortified breads and cereals  · Dried beans, lentils, and split peas  · Leafy, dark-green vegetables  · Eggs     Getting started  Change to a vegetarian diet slowly. Start by eating more grains, beans, vegetables, and fruits. Make fish, poultry, or meat a side dish. Then slowly cut them out of your diet. Here are some other tips:  · Eat three or more servings of vegetables a day. Eat them raw or lightly steamed.  · Eat two or more servings of fruit a day. Choose whole fruits with the skin on.  · Choose a wide range of grains and whole-grain breads and cereals. Eat six or more servings of these foods each day.  · Begin to replace meat by working up to two to three servings a day of beans, lentils, split peas, tofu, or tempeh.  · If you eat dairy foods, have two to three servings a day. Make low-fat or fat-free choices.  For vegans: Add sources of calcium and vitamin B12, such as fortified nondairy milks and breakfast cereals. Talk to your health care provider about vitamin supplements.  To learn more  A registered dietitian (RD) can help you plan a healthy vegetarian diet. For more information and to find an RD who knows about vegetarian diets, search for one through the Vegetarian Nutrition Dietetic Practice Group of the Academy of Nutrition and Dietetics (AND) at their website, www.vegetariannutrition.net. You can also search AND's website, www.eatright.org. Other groups that can help include:  · Vegetarian Resource Group (www.vrg.org)  · American Cancer Society (www.cancer.org)  · American Heart Association (www.heart.org)  Date Last Reviewed: 6/1/2015  © 7099-8210 [employer]. [address] [place] [address], [place] [address]. All rights reserved. This information is not intended as a substitute for professional medical care. Always follow your healthcare professional's instructions.        Eating a Vegetarian Diet  A vegetarian diet is based on plant foods. It includes fruits,  vegetables, beans, grains, seeds, and nuts. Some vegetarians also eat dairy foods and eggs. There are three common vegetarian diets:  · Lacto-ovo vegetarians eat eggs, yogurt, cheese, and other milk products, as well as plant foods.  · Lacto vegetarians eat dairy and plant foods but not eggs.  · Vegans eat only plant foods.  Why eat vegetarian?  People choose to be vegetarians for health, cultural, social, and Judaism reasons. A vegetarian diet is a healthy way to eat. You just have to plan your meals carefully so that you get all the nutrients you need. Most vegetarian diets are high in fiber and low in fat and cholesterol. That means eating vegetarian can:  · Lower your risk of heart disease.  · Lower your blood pressure and cholesterol levels.  · Help you maintain a healthy weight.  · Decrease digestive problems including:  ¨ Bowel diseases  ¨ Gallstones  ¨ Colon cancer  Vegetarian basics  A vegetarian diet can be a healthy way to eat for people of all ages. But meals and snacks must be planned to include non-meat sources of protein, vitamins, and other nutrients. (See the chart below.) Here are some guidelines for healthy meal planning:  · Eat a wide range of foods. This will help you get all the nutrients you need.  · Eat a number of plant proteins throughout the day.  · Plan for enough calories each day. Also make sure that your calories come from foods that are rich in protein, vitamins, and minerals.  · If you eat dairy foods, choose low-fat or fat-free milk, yogurt, or cheese.  Do you need supplements?  A vegetarian diet can easily supply all the calories, protein, vitamins, and minerals that a person needs. But some people have special needs. They may include children and teens, pregnant and lactating women, women past midlife, the elderly, and vegans. If you are in one of these groups, you may need extra calories, protein, calcium, iron, vitamin B12, or zinc. The lists below can help you choose foods  that are good sources of these nutrients. And be sure to ask your health care provider about taking vitamin supplements.  Protein  · Dried beans, soybeans, and lentils  · Tofu (bean curd) and tempeh (cultured soybeans)  · Rice, barley, and other whole grains  · Nuts and nut butter  · Milk, yogurt, and cheese  · Eggs Vitamin B12  · Milk, yogurt, and cheese  · Eggs  · Fortified soy burgers  · Fortified soy milk or other nondairy milk  · Fortified cereals  · Nutritional yeast   Zinc  · Milk, yogurt, and cheese  · Eggs  · Canned or dried beans  · Lentils and split peas  · Wheat germ  · Whole-grain breads and cereals  · Nuts and nut butters  · Pumpkin and sunflower seeds Calcium  · Milk, yogurt, and cheese  · Fortified soy milk or other nondairy milk  · Tofu processed with calcium sulfate  · Leafy, dark-green vegetables  · Dried figs  · Fortified orange juice and fortified cereals  · Sesame seeds  · Beans   Iron  · Wheat germ  · Dried fruits  · Nuts and seeds  · Whole grain and fortified breads and cereals  · Dried beans, lentils, and split peas  · Leafy, dark-green vegetables  · Eggs     Getting started  Change to a vegetarian diet slowly. Start by eating more grains, beans, vegetables, and fruits. Make fish, poultry, or meat a side dish. Then slowly cut them out of your diet. Here are some other tips:  · Eat three or more servings of vegetables a day. Eat them raw or lightly steamed.  · Eat two or more servings of fruit a day. Choose whole fruits with the skin on.  · Choose a wide range of grains and whole-grain breads and cereals. Eat six or more servings of these foods each day.  · Begin to replace meat by working up to two to three servings a day of beans, lentils, split peas, tofu, or tempeh.  · If you eat dairy foods, have two to three servings a day. Make low-fat or fat-free choices.  For vegans: Add sources of calcium and vitamin B12, such as fortified nondairy milks and breakfast cereals. Talk to your health  care provider about vitamin supplements.  To learn more  A registered dietitian (RD) can help you plan a healthy vegetarian diet. For more information and to find an RD who knows about vegetarian diets, search for one through the Vegetarian Nutrition Dietetic Practice Group of the Academy of Nutrition and Dietetics (AND) at their website, www.vegetariannutrition.net. You can also search AND's website, www.eatright.org. Other groups that can help include:  · Vegetarian Resource Group (www.vrg.org)  · American Cancer Society (www.cancer.org)  · American Heart Association (www.heart.org)  Date Last Reviewed: 6/1/2015  © 6292-5996 [employer]. [address] [place] [address], [place] [address]. All rights reserved. This information is not intended as a substitute for professional medical care. Always follow your healthcare professional's instructions.

## 2018-03-08 ENCOUNTER — DOCUMENTATION ONLY (OUTPATIENT)
Dept: FAMILY MEDICINE | Facility: CLINIC | Age: 23
End: 2018-03-08

## 2018-03-08 NOTE — PROGRESS NOTES
Pre-Visit Chart Review  For Appointment Scheduled on 3/9/2018    Health Maintenance Due   Topic Date Due    Lipid Panel  1996    HPV Vaccines (1 of 3 - Female 3 Dose Series) 09/06/2007    Influenza Vaccine  08/01/2017    Pap Smear  09/06/2017

## 2018-03-09 ENCOUNTER — LAB VISIT (OUTPATIENT)
Dept: LAB | Facility: HOSPITAL | Age: 23
End: 2018-03-09
Attending: PHYSICIAN ASSISTANT
Payer: COMMERCIAL

## 2018-03-09 ENCOUNTER — OFFICE VISIT (OUTPATIENT)
Dept: FAMILY MEDICINE | Facility: CLINIC | Age: 23
End: 2018-03-09
Payer: COMMERCIAL

## 2018-03-09 VITALS
SYSTOLIC BLOOD PRESSURE: 106 MMHG | WEIGHT: 177.94 LBS | BODY MASS INDEX: 30.38 KG/M2 | TEMPERATURE: 99 F | HEART RATE: 81 BPM | HEIGHT: 64 IN | DIASTOLIC BLOOD PRESSURE: 74 MMHG

## 2018-03-09 DIAGNOSIS — E66.9 OBESITY (BMI 30.0-34.9): ICD-10-CM

## 2018-03-09 DIAGNOSIS — R21 SKIN RASH: ICD-10-CM

## 2018-03-09 DIAGNOSIS — R21 SKIN RASH: Primary | ICD-10-CM

## 2018-03-09 DIAGNOSIS — D84.9 IMMUNOCOMPROMISED: ICD-10-CM

## 2018-03-09 PROBLEM — E66.811 OBESITY (BMI 30.0-34.9): Status: ACTIVE | Noted: 2018-03-09

## 2018-03-09 LAB
ANION GAP SERPL CALC-SCNC: 6 MMOL/L
BUN SERPL-MCNC: 7 MG/DL
CALCIUM SERPL-MCNC: 9 MG/DL
CHLORIDE SERPL-SCNC: 105 MMOL/L
CO2 SERPL-SCNC: 25 MMOL/L
CREAT SERPL-MCNC: 0.7 MG/DL
EST. GFR  (AFRICAN AMERICAN): >60 ML/MIN/1.73 M^2
EST. GFR  (NON AFRICAN AMERICAN): >60 ML/MIN/1.73 M^2
GLUCOSE SERPL-MCNC: 79 MG/DL
POTASSIUM SERPL-SCNC: 4.1 MMOL/L
SODIUM SERPL-SCNC: 136 MMOL/L

## 2018-03-09 PROCEDURE — 80048 BASIC METABOLIC PNL TOTAL CA: CPT

## 2018-03-09 PROCEDURE — 99999 PR PBB SHADOW E&M-EST. PATIENT-LVL IV: CPT | Mod: PBBFAC,,, | Performed by: PHYSICIAN ASSISTANT

## 2018-03-09 PROCEDURE — 99214 OFFICE O/P EST MOD 30 MIN: CPT | Mod: S$GLB,,, | Performed by: PHYSICIAN ASSISTANT

## 2018-03-09 PROCEDURE — 36415 COLL VENOUS BLD VENIPUNCTURE: CPT | Mod: PO

## 2018-03-09 RX ORDER — TERBINAFINE HYDROCHLORIDE 250 MG/1
250 TABLET ORAL DAILY
Qty: 30 TABLET | Refills: 0 | Status: SHIPPED | OUTPATIENT
Start: 2018-03-09 | End: 2018-04-08

## 2018-03-09 RX ORDER — CLOTRIMAZOLE AND BETAMETHASONE DIPROPIONATE 10; .64 MG/G; MG/G
CREAM TOPICAL 2 TIMES DAILY
Qty: 15 G | Refills: 0 | Status: SHIPPED | OUTPATIENT
Start: 2018-03-09 | End: 2018-11-07 | Stop reason: ALTCHOICE

## 2018-03-09 NOTE — PROGRESS NOTES
Subjective:       Patient ID: Albina Beckham is a 22 y.o. female.    Chief Complaint: Rash    Mrs. Beckham is a 22 year old female who presents to clinic for evaluation of rash. She was seen in clinic nearly 2 weeks ago for rash under the right breast that was originally diagnosed as shingles per urgent Care. Dr. Lou diagnosed her with cutaneous candidal infection and started her on 10 day course of fluconazole, topical nystatin, and gave her IM antibiotics. She states rash under the breast has improved slightly; however, she developed new plaques across the entire abdomen about 1 week later and then new peeling rash in the axillary regions within the last 24 hours. She states rash is quite pruritic. She has not been applying any topical medication for fear of worsening symptoms. She is immunocompromised on HCQ for RA. She denies recent change in soap, lotion, detergent, or medication.       Review of Systems   Constitutional: Positive for fatigue. Negative for activity change, appetite change, chills and fever.   HENT: Negative for congestion, rhinorrhea and sore throat.    Eyes: Negative for pain and visual disturbance.   Respiratory: Negative for cough and shortness of breath.    Cardiovascular: Negative for chest pain, palpitations and leg swelling.   Gastrointestinal: Negative for abdominal pain, constipation, diarrhea, nausea and vomiting.   Musculoskeletal: Negative for arthralgias.   Skin: Positive for rash.   Neurological: Negative for dizziness, weakness, light-headedness and headaches.       Objective:      Vitals:    03/09/18 1033   BP: 106/74   Pulse: 81   Temp: 98.7 °F (37.1 °C)     Physical Exam   Constitutional: She is oriented to person, place, and time. She appears well-developed.   Obese body habitus.    HENT:   Head: Normocephalic and atraumatic.   Eyes: Conjunctivae and EOM are normal. Pupils are equal, round, and reactive to light.   Cardiovascular: Normal rate, regular rhythm, normal  heart sounds and intact distal pulses.    Pulmonary/Chest: Effort normal and breath sounds normal.   Neurological: She is alert and oriented to person, place, and time.   Skin: Skin is warm and dry.   Large well demarcated erythematous lesion under the right breast with several circular erythematous eczematous lesion on the abdomen and in axilla with skin peeling noted.   Psychiatric: She has a normal mood and affect.       Assessment:       1. Skin rash    2. Immunocompromised    3. Obesity (BMI 30.0-34.9)        Plan:       Patient was examined with Dr. Robledo and plan is below:    Skin rash         Differential diagnosis includes eczema vs fungal  -     Ambulatory referral to Dermatology if symptoms persist or worsen  -     Basic metabolic panel; Future; Expected date: 03/09/2018. Check baseline LFTs and repeat in 1 month.   -     clotrimazole-betamethasone 1-0.05% (LOTRISONE) cream; Apply topically 2 (two) times daily.  Dispense: 15 g; Refill: 0  -     terbinafine HCl (LAMISIL) 250 mg tablet; Take 1 tablet (250 mg total) by mouth once daily.  Dispense: 30 tablet; Refill: 0    Immunocompromised        - On HCQ for RA per Rheumatology    Obesity (BMI 30.0-34.9)       -  Not addressed at urgent care visit    Patient readiness: acceptance and barriers:none    During the course of the visit the patient was educated and counseled about the following:     Obesity:   not discussed    Goals: Obesity: Reduce calorie intake and BMI    Did patient meet goals/outcomes: No    The following self management tools provided: declined    Patient Instructions (the written plan) was given to the patient/family.     Time spent with patient: 15 minutes    1 month f/u

## 2018-03-12 DIAGNOSIS — R21 SKIN RASH: Primary | ICD-10-CM

## 2018-04-02 ENCOUNTER — DOCUMENTATION ONLY (OUTPATIENT)
Dept: FAMILY MEDICINE | Facility: CLINIC | Age: 23
End: 2018-04-02

## 2018-04-02 NOTE — PROGRESS NOTES
Pre-Visit Chart Review  For Appointment Scheduled on 4-3-18    Health Maintenance Due   Topic Date Due    Lipid Panel  1996    HPV Vaccines (1 of 3 - Female 3 Dose Series) 09/06/2007    Influenza Vaccine  08/01/2017    Pap Smear  09/06/2017

## 2018-04-03 ENCOUNTER — TELEPHONE (OUTPATIENT)
Dept: FAMILY MEDICINE | Facility: CLINIC | Age: 23
End: 2018-04-03

## 2018-04-03 NOTE — TELEPHONE ENCOUNTER
"Patient was called on 4-2-18 and 4-3-18 to rebook to a provider that accepts patients. She did not call back. Mother was called this am. She was very upset that I could not rebook her with another provider today at the same time. She was offered tomorrow at 6:20 with Roxana Gunn. She was very upset and stated "we dropped the ball". That " we messed her up real bad- she has to have her follow up today" she was going to call and complain.   "

## 2018-04-10 ENCOUNTER — OFFICE VISIT (OUTPATIENT)
Dept: FAMILY MEDICINE | Facility: CLINIC | Age: 23
End: 2018-04-10
Payer: COMMERCIAL

## 2018-04-10 VITALS
BODY MASS INDEX: 29.84 KG/M2 | HEART RATE: 79 BPM | WEIGHT: 174.81 LBS | SYSTOLIC BLOOD PRESSURE: 102 MMHG | HEIGHT: 64 IN | DIASTOLIC BLOOD PRESSURE: 65 MMHG

## 2018-04-10 DIAGNOSIS — R21 RASH, SKIN: Primary | ICD-10-CM

## 2018-04-10 PROCEDURE — 99213 OFFICE O/P EST LOW 20 MIN: CPT | Mod: S$GLB,,, | Performed by: NURSE PRACTITIONER

## 2018-04-10 PROCEDURE — 99999 PR PBB SHADOW E&M-EST. PATIENT-LVL III: CPT | Mod: PBBFAC,,, | Performed by: NURSE PRACTITIONER

## 2018-04-10 NOTE — PROGRESS NOTES
Chief Complaint   Patient presents with    Rash     Breast       History of Present Illness: Albina Beckham is a 22 y.o. female that presents today 4/10/2018 for   Pt presents today c/o rash underneath breasts, underarms and inside of right elbow. She reports that she has been having this ongoing rash for a couple of months. She was prescribed lotrisone and nystatin creams to use. This is the 3rd episode of the rash. Each time she has had the rash she has used the creams and it has gone away, but then it comes back 2-3 weeks later. This episode started about a week ago and she began using the creams. She showed pictures on her phone of when the rash appeared and it had a blistering appearance in certain areas and was reddened and peeling. She states that this episode is starting to clear up, but she wants to know why it keeps coming back. No other complaints or concerns noted.        Chief Complaint   Patient presents with    Rash     Breast         Past Medical History:   Diagnosis Date    Juvenile arthritis     Juvenile rheumatoid arthritis        Past Surgical History:   Procedure Laterality Date    KNEE SURGERY Left     reconstructive    rheuamtoid nodole removed         Current Outpatient Prescriptions   Medication Sig Dispense Refill    clotrimazole-betamethasone 1-0.05% (LOTRISONE) cream Apply topically 2 (two) times daily. 15 g 0    ENSKYCE 0.15-0.03 mg per tablet Take 1 tablet by mouth once daily.      epinephrine (EPIPEN 2-KIMANI) 0.3 mg/0.3 mL AtIn Inject Epipen as directed. 2 each 2    hydroxychloroquine (PLAQUENIL) 200 mg tablet Take 200 mg by mouth once daily.      ibuprofen (ADVIL,MOTRIN) 800 MG tablet Take 1 tablet (800 mg total) by mouth daily as needed for Pain (headaches). 30 tablet 0    tramadol (ULTRAM) 50 mg tablet Take 50 mg by mouth every 4 (four) hours as needed. for pain.  5    valACYclovir (VALTREX) 1000 MG tablet Take 1 tablet by mouth daily as needed.      traZODone  "(DESYREL) 50 MG tablet TAKE 1 TABLET BY MOUTH NIGHTLY AS NEEDED FOR INSOMNIA 30 tablet 0     No current facility-administered medications for this visit.        Review of patient's allergies indicates:   Allergen Reactions    Ciprofloxacin Anaphylaxis       Family History   Problem Relation Age of Onset    Arthritis Father        Social History   Substance Use Topics    Smoking status: Never Smoker    Smokeless tobacco: Never Used    Alcohol use No       OB History    Para Term  AB Living   0 0 0 0 0 0   SAB TAB Ectopic Multiple Live Births   0 0 0 0 0             Review of Symptoms:  GENERAL: Denies weight gain or weight loss. Feeling well overall.   SKIN: Reports rash under breasts, armpits and inside of elbows. No lesions.   HEAD: Denies head injury or headache.   ABDOMEN: No abdominal pain, constipation, diarrhea, nausea, vomiting or rectal bleeding.   URINARY: No frequency, dysuria, hematuria, or burning on urination.    /65   Pulse 79   Ht 5' 4" (1.626 m)   Wt 79.3 kg (174 lb 13.2 oz)   LMP  (LMP Unknown)   Physical Exam:  APPEARANCE: Well nourished, well developed, in no acute distress.  SKIN: Rash - not reddened, but dark pigmented appearance, which appears to be healing, under breasts, armpits and inside of right elbow.     ASSESSMENT/PLAN:  Rash, skin  -     Ambulatory referral to Dermatology        -Discussed with pt that this may be eczema or psoriasis and I feel as if she needs further evaluation from dermatology since this is something that has become recurrent over the past couple of months. Pt okay with this and verbalized understanding.     Pt does have derm appointment in May, but derm nurse is trying to get her sooner appointment.     Follow-up:  RTC as needed    "

## 2018-04-12 ENCOUNTER — OFFICE VISIT (OUTPATIENT)
Dept: DERMATOLOGY | Facility: CLINIC | Age: 23
End: 2018-04-12
Payer: COMMERCIAL

## 2018-04-12 VITALS — BODY MASS INDEX: 29.71 KG/M2 | WEIGHT: 174 LBS | HEIGHT: 64 IN

## 2018-04-12 DIAGNOSIS — L98.9 DISEASE OF SKIN AND SUBCUTANEOUS TISSUE: Primary | ICD-10-CM

## 2018-04-12 PROCEDURE — 11100 PR BIOPSY OF SKIN LESION: CPT | Mod: S$GLB,,, | Performed by: DERMATOLOGY

## 2018-04-12 PROCEDURE — 99202 OFFICE O/P NEW SF 15 MIN: CPT | Mod: 25,S$GLB,, | Performed by: DERMATOLOGY

## 2018-04-12 PROCEDURE — 88312 SPECIAL STAINS GROUP 1: CPT | Mod: 26,,, | Performed by: PATHOLOGY

## 2018-04-12 PROCEDURE — 99999 PR PBB SHADOW E&M-EST. PATIENT-LVL III: CPT | Mod: PBBFAC,,, | Performed by: DERMATOLOGY

## 2018-04-12 PROCEDURE — 88305 TISSUE EXAM BY PATHOLOGIST: CPT | Performed by: PATHOLOGY

## 2018-04-12 RX ORDER — TRIAMCINOLONE ACETONIDE 0.25 MG/G
CREAM TOPICAL
Qty: 30 G | Refills: 3 | Status: SHIPPED | OUTPATIENT
Start: 2018-04-12 | End: 2018-11-07 | Stop reason: ALTCHOICE

## 2018-04-12 RX ORDER — CICLOPIROX OLAMINE 7.7 MG/G
CREAM TOPICAL
Qty: 30 G | Refills: 1 | Status: SHIPPED | OUTPATIENT
Start: 2018-04-12 | End: 2018-11-07 | Stop reason: ALTCHOICE

## 2018-04-12 NOTE — PROGRESS NOTES
"  Subjective:       Patient ID:  Albina Beckham is a 22 y.o. female who presents for   Chief Complaint   Patient presents with    Rash     braline, arms and axillas     Initial visit  H/o JRA, dx age 8, Dr Chandra managing, on plaquenil 200 BID, not taking it, "joints don't hurt"  C/o rash, itchy, started under breasts, now armpits      February, rash under beast, saw 4 different porviders, gave several cream, resolves then comes back, then goes to another provider and new cream given and comes back  Currently using lotrisone    Current Outpatient Prescriptions:     clotrimazole-betamethasone 1-0.05% (LOTRISONE) cream, Apply topically 2 (two) times daily., Disp: 15 g, Rfl: 0    ENSKYCE 0.15-0.03 mg per tablet, Take 1 tablet by mouth once daily., Disp: , Rfl:     epinephrine (EPIPEN 2-KIMANI) 0.3 mg/0.3 mL AtIn, Inject Epipen as directed., Disp: 2 each, Rfl: 2    ibuprofen (ADVIL,MOTRIN) 800 MG tablet, Take 1 tablet (800 mg total) by mouth daily as needed for Pain (headaches)., Disp: 30 tablet, Rfl: 0    tramadol (ULTRAM) 50 mg tablet, Take 50 mg by mouth every 4 (four) hours as needed. for pain., Disp: , Rfl: 5    traZODone (DESYREL) 50 MG tablet, TAKE 1 TABLET BY MOUTH NIGHTLY AS NEEDED FOR INSOMNIA, Disp: 30 tablet, Rfl: 0    valACYclovir (VALTREX) 1000 MG tablet, Take 1 tablet by mouth daily as needed., Disp: , Rfl:     hydroxychloroquine (PLAQUENIL) 200 mg tablet, Take 200 mg by mouth once daily., Disp: , Rfl:         Rash  - Initial  Affected locations: chest, left axilla, right axilla, left arm and right arm  Duration: 2 months  Signs / symptoms: itching and spreading  Severity: mild to moderate  Timing: constant  Aggravated by: scratching  Treatments tried: Lotrisone cream.        Review of Systems   Constitutional: Negative for fever, chills, weight loss, weight gain, fatigue and malaise.   Skin: Positive for itching, rash and dry skin.   Hematologic/Lymphatic: Does not bruise/bleed easily.    "     Objective:    Physical Exam   Skin:   Areas Examined (abnormalities noted in diagram):   Chest / Axilla Inspection Performed  RUE Inspected  LUE Inspection Performed              Diagram Legend     Erythematous scaling macule/papule c/w actinic keratosis       Vascular papule c/w angioma      Pigmented verrucoid papule/plaque c/w seborrheic keratosis      Yellow umbilicated papule c/w sebaceous hyperplasia      Irregularly shaped tan macule c/w lentigo     1-2 mm smooth white papules consistent with Milia      Movable subcutaneous cyst with punctum c/w epidermal inclusion cyst      Subcutaneous movable cyst c/w pilar cyst      Firm pink to brown papule c/w dermatofibroma      Pedunculated fleshy papule(s) c/w skin tag(s)      Evenly pigmented macule c/w junctional nevus     Mildly variegated pigmented, slightly irregular-bordered macule c/w mildly atypical nevus      Flesh colored to evenly pigmented papule c/w intradermal nevus       Pink pearly papule/plaque c/w basal cell carcinoma      Erythematous hyperkeratotic cursted plaque c/w SCC      Surgical scar with no sign of skin cancer recurrence      Open and closed comedones      Inflammatory papules and pustules      Verrucoid papule consistent consistent with wart     Erythematous eczematous patches and plaques     Dystrophic onycholytic nail with subungual debris c/w onychomycosis     Umbilicated papule    Erythematous-base heme-crusted tan verrucoid plaque consistent with inflamed seborrheic keratosis     Erythematous Silvery Scaling Plaque c/w Psoriasis     See annotation                      Assessment / Plan:      Pathology Orders:     Normal Orders This Visit    Tissue Specimen To Pathology, Dermatology     Questions:    Directional Terms:  Other(comment)    Clinical information:  b axillae with pink purple circular plaque, spares vault, peripheral scaling, KOH negative, modified with lotrisone application x 2 months, RA patient, IGD vs erythrasma vs  fungal vs other, now extension to AC fossa    Specific Site:  R axilla        Disease of skin and subcutaneous tissue  -     ciclopirox (LOPROX) 0.77 % Crea; AAA axillae and AC fossa BID PRN flare  Dispense: 30 g; Refill: 1  -     triamcinolone acetonide 0.025% (KENALOG) 0.025 % cream; Apply to AA BID PRN flare  Dispense: 30 g; Refill: 3  -     Tissue Specimen To Pathology, Dermatology    Punch biopsy procedure note:  Punch biopsy performed after verbal consent obtained. Area marked and prepped with alcohol. Approximately 1cc of 1% lidocaine with epinephrine injected. 3 mm disposable punch used to remove lesion. Hemostasis obtained and biopsy site closed with 1 - 2 Prolene sutures. Wound care instructions reviewed with patient and handout given.           Follow-up if symptoms worsen or fail to improve.

## 2018-04-12 NOTE — PATIENT INSTRUCTIONS
Punch Biopsy Wound Care    Your doctor has performed a punch biopsy today.  A band aid and antibiotic ointment has been placed over the site.  This should remain in place for 24 hours.  It is recommended that you keep the area dry for the first 24 hours.  After 24 hours, you may remove the band aid and wash the area with warm soap and water and apply Vaseline jelly.  Many patients prefer to use Neosporin or Bacitracin ointment.  This is acceptable; however know that you can develop an allergy to this medication even if you have used it safely for years.  It is important to keep the area moist.  Letting it dry out and get air slows healing time, will worsen the scar, and make it more difficult to remove the stitches if they were placed.  Band aid is optional after first 24 hours.      If you notice increasing redness, tenderness, pain, or yellow drainage at the biopsy or surgical site, please notify your doctor.  These are signs of an infection.    If your biopsy/surgical site is bleeding, apply firm pressure for 15 minutes straight.  Repeat for another 15 minutes, if it is still bleeding.   If the surgical site continues to bleed, then please contact your doctor.     [facility]  [facility]  [address] [place] [id number]  Dept: [phone]

## 2018-04-19 ENCOUNTER — CLINICAL SUPPORT (OUTPATIENT)
Dept: DERMATOLOGY | Facility: CLINIC | Age: 23
End: 2018-04-19
Payer: COMMERCIAL

## 2018-04-19 DIAGNOSIS — Z48.02 VISIT FOR SUTURE REMOVAL: Primary | ICD-10-CM

## 2018-04-19 PROCEDURE — 99024 POSTOP FOLLOW-UP VISIT: CPT | Mod: S$GLB,,, | Performed by: DERMATOLOGY

## 2018-04-19 NOTE — PROGRESS NOTES
Patient presents for suture removal. The wound is well healed without signs of infection.  The sutures are removed. Wound care and activity instructions given. Return prn.

## 2018-04-23 ENCOUNTER — TELEPHONE (OUTPATIENT)
Dept: DERMATOLOGY | Facility: CLINIC | Age: 23
End: 2018-04-23

## 2018-04-23 NOTE — TELEPHONE ENCOUNTER
----- Message from Eve Mosher sent at 4/23/2018  2:09 PM CDT -----  Contact: self  Type:  Patient Returning Call    Who Called:  Albina Beckham  Who Left Message for Patient:  Cindy  Does the patient know what this is regarding?:  No  Best Call Back Number: [phone] (home)   Additional Information:  NA

## 2018-09-25 LAB — HUMAN PAPILLOMAVIRUS (HPV): NORMAL

## 2018-11-06 ENCOUNTER — DOCUMENTATION ONLY (OUTPATIENT)
Dept: FAMILY MEDICINE | Facility: CLINIC | Age: 23
End: 2018-11-06

## 2018-11-06 NOTE — PROGRESS NOTES
Pre-Visit Chart Review  For Appointment Scheduled on 11/07/18    Health Maintenance Due   Topic Date Due    Lipid Panel  1996    HPV Vaccines (1 - Female 3-dose series) 09/06/2007    CHLAMYDIA SCREENING  09/06/2011    Pap Smear  09/06/2017    Influenza Vaccine  08/01/2018

## 2018-11-07 ENCOUNTER — OFFICE VISIT (OUTPATIENT)
Dept: FAMILY MEDICINE | Facility: CLINIC | Age: 23
End: 2018-11-07
Payer: COMMERCIAL

## 2018-11-07 VITALS
HEART RATE: 82 BPM | WEIGHT: 197.75 LBS | DIASTOLIC BLOOD PRESSURE: 75 MMHG | TEMPERATURE: 99 F | SYSTOLIC BLOOD PRESSURE: 117 MMHG | BODY MASS INDEX: 33.76 KG/M2 | HEIGHT: 64 IN

## 2018-11-07 DIAGNOSIS — F41.9 ANXIETY AND DEPRESSION: Primary | ICD-10-CM

## 2018-11-07 DIAGNOSIS — F32.A ANXIETY AND DEPRESSION: Primary | ICD-10-CM

## 2018-11-07 PROCEDURE — 3008F BODY MASS INDEX DOCD: CPT | Mod: CPTII,S$GLB,, | Performed by: PHYSICIAN ASSISTANT

## 2018-11-07 PROCEDURE — 99999 PR PBB SHADOW E&M-EST. PATIENT-LVL III: CPT | Mod: PBBFAC,,, | Performed by: PHYSICIAN ASSISTANT

## 2018-11-07 PROCEDURE — 99213 OFFICE O/P EST LOW 20 MIN: CPT | Mod: S$GLB,,, | Performed by: PHYSICIAN ASSISTANT

## 2018-11-07 RX ORDER — ESCITALOPRAM OXALATE 5 MG/1
5 TABLET ORAL DAILY
Qty: 30 TABLET | Refills: 0 | Status: SHIPPED | OUTPATIENT
Start: 2018-11-07 | End: 2018-12-17 | Stop reason: SDUPTHER

## 2018-11-07 NOTE — PATIENT INSTRUCTIONS

## 2018-11-07 NOTE — PROGRESS NOTES
Subjective:       Patient ID: Albina Beckham is a 23 y.o. female.    Chief Complaint: Medication check    HPI   Patient is a 23 year old AA female presenting to the clinic presenting to the clinic with concern of not feeling like herself. Patient has noticed that she has been easily agitated lately. She reports not being able to think before she reacts to people. She also has gained upwards of 30lbs pounds and finds herself wanting to lay in bed a lot. She is no longer running like she was previously. She reports her boyfriend has noticed a change in her mood.  Review of Systems   Constitutional: Negative for activity change, appetite change, chills, diaphoresis, fatigue and fever.   HENT: Negative for congestion, postnasal drip and rhinorrhea.    Respiratory: Negative.  Negative for cough, shortness of breath and wheezing.    Cardiovascular: Negative.  Negative for chest pain.   Gastrointestinal: Negative for abdominal pain, blood in stool, constipation, diarrhea, nausea and vomiting.   Genitourinary: Negative for dysuria, frequency, hematuria and urgency.   Musculoskeletal: Negative.    Skin: Negative.  Negative for color change and rash.   Neurological: Negative for dizziness and syncope.   Psychiatric/Behavioral: Positive for agitation and dysphoric mood. Negative for behavioral problems, confusion, self-injury, sleep disturbance and suicidal ideas. The patient is nervous/anxious.        Objective:      Physical Exam   Constitutional: Vital signs are normal. She appears well-developed and well-nourished. No distress.   Cardiovascular: Normal rate, regular rhythm, S1 normal, S2 normal and normal heart sounds. Exam reveals no gallop.   No murmur heard.  Pulses:       Radial pulses are 2+ on the right side, and 2+ on the left side.   <2sec cap refill fingers bilat     Pulmonary/Chest: Effort normal and breath sounds normal. No respiratory distress. She has no wheezes. She has no rhonchi.   Skin: Skin is warm  and dry. She is not diaphoretic.   Appropriate skin turgor   Psychiatric: She has a normal mood and affect. Her speech is normal and behavior is normal. Judgment and thought content normal. Cognition and memory are normal.       Assessment:       1. Anxiety and depression        Plan:       Albina was seen today for medication check.    Diagnoses and all orders for this visit:    Anxiety and depression    -     escitalopram oxalate (LEXAPRO) 5 MG Tab; Take 1 tablet (5 mg total) by mouth once daily.  I've explained to her that drugs of the SSRI class can have side effects such as weight gain, sexual dysfunction, insomnia, headache, nausea. These medications are generally effective at alleviating symptoms of anxiety and/or depression. Let me know if significant side effects do occur.  3-4 week f/u scheduled

## 2018-11-28 ENCOUNTER — DOCUMENTATION ONLY (OUTPATIENT)
Dept: FAMILY MEDICINE | Facility: CLINIC | Age: 23
End: 2018-11-28

## 2018-11-28 NOTE — PROGRESS NOTES
Pre-Visit Chart Review  For Appointment Scheduled on 11/28/18    Health Maintenance Due   Topic Date Due    Lipid Panel  1996    HPV Vaccines (1 - Female 3-dose series) 09/06/2007    CHLAMYDIA SCREENING  09/06/2011    Pap Smear  09/06/2017    Influenza Vaccine  08/01/2018

## 2018-12-05 RX ORDER — ESCITALOPRAM OXALATE 5 MG/1
TABLET ORAL
Qty: 30 TABLET | Refills: 0 | Status: CANCELLED | OUTPATIENT
Start: 2018-12-05

## 2018-12-05 NOTE — TELEPHONE ENCOUNTER
Patient was supposed to f/u on lexapro. I just received a refill request. Is patient taking this or is this helping?

## 2018-12-11 ENCOUNTER — PATIENT MESSAGE (OUTPATIENT)
Dept: FAMILY MEDICINE | Facility: CLINIC | Age: 23
End: 2018-12-11

## 2018-12-13 ENCOUNTER — TELEPHONE (OUTPATIENT)
Dept: FAMILY MEDICINE | Facility: CLINIC | Age: 23
End: 2018-12-13

## 2018-12-17 ENCOUNTER — OFFICE VISIT (OUTPATIENT)
Dept: FAMILY MEDICINE | Facility: CLINIC | Age: 23
End: 2018-12-17
Payer: COMMERCIAL

## 2018-12-17 VITALS
DIASTOLIC BLOOD PRESSURE: 85 MMHG | HEART RATE: 85 BPM | TEMPERATURE: 99 F | SYSTOLIC BLOOD PRESSURE: 125 MMHG | RESPIRATION RATE: 18 BRPM | WEIGHT: 200.81 LBS | HEIGHT: 64 IN | BODY MASS INDEX: 34.28 KG/M2

## 2018-12-17 DIAGNOSIS — Z13.220 SCREENING FOR LIPID DISORDERS: ICD-10-CM

## 2018-12-17 DIAGNOSIS — F32.1 CURRENT MODERATE EPISODE OF MAJOR DEPRESSIVE DISORDER WITHOUT PRIOR EPISODE: Primary | ICD-10-CM

## 2018-12-17 DIAGNOSIS — R53.82 CHRONIC FATIGUE: ICD-10-CM

## 2018-12-17 DIAGNOSIS — Z79.899 HIGH RISK MEDICATION USE: ICD-10-CM

## 2018-12-17 DIAGNOSIS — M06.9 RHEUMATOID ARTHRITIS, INVOLVING UNSPECIFIED SITE, UNSPECIFIED RHEUMATOID FACTOR PRESENCE: ICD-10-CM

## 2018-12-17 DIAGNOSIS — F41.1 GENERALIZED ANXIETY DISORDER: ICD-10-CM

## 2018-12-17 PROCEDURE — 99214 OFFICE O/P EST MOD 30 MIN: CPT | Mod: S$GLB,,, | Performed by: NURSE PRACTITIONER

## 2018-12-17 PROCEDURE — 3008F BODY MASS INDEX DOCD: CPT | Mod: CPTII,S$GLB,, | Performed by: NURSE PRACTITIONER

## 2018-12-17 PROCEDURE — 99999 PR PBB SHADOW E&M-EST. PATIENT-LVL IV: CPT | Mod: PBBFAC,,, | Performed by: NURSE PRACTITIONER

## 2018-12-17 RX ORDER — ESCITALOPRAM OXALATE 5 MG/1
5 TABLET ORAL DAILY
Qty: 30 TABLET | Refills: 1 | Status: SHIPPED | OUTPATIENT
Start: 2018-12-17 | End: 2018-12-18 | Stop reason: SDUPTHER

## 2018-12-17 NOTE — PROGRESS NOTES
Subjective:       Patient ID: Albina Beckham is a 23 y.o. female.    Chief Complaint: Anxiety    Chief Complaint  Chief Complaint   Patient presents with    Anxiety       HPI  Albina Beckham is a 23 y.o. female with medical diagnoses as listed in the medical history and problem list that presents with c/o anxiety. Patient had been taking lexapro 5 mg for this complaint but ran out of the medication and feels that dose may need to be adjusted. Patient states that while she was taking lexapro 5 mg she did feel better, crying less and not as angry. Without the medication she is not feeling as well. Patient is concerned that she is gaining weight rapidly, however she is not exercising like she was, is not active, and is eating with stress.   Blood pressure today is 128/85. BMI today is 34.47 and patient has gained 3 pounds since last visit.  Established patient with last clinic appointment 11/7/2018.        PAST MEDICAL HISTORY:  Past Medical History:   Diagnosis Date    Juvenile arthritis     Juvenile rheumatoid arthritis        PAST SURGICAL HISTORY:  Past Surgical History:   Procedure Laterality Date    KNEE SURGERY Left     reconstructive    rheuamtoid nodole removed         SOCIAL HISTORY:  Social History     Socioeconomic History    Marital status: Single     Spouse name: Not on file    Number of children: Not on file    Years of education: Not on file    Highest education level: Not on file   Social Needs    Financial resource strain: Not on file    Food insecurity - worry: Not on file    Food insecurity - inability: Not on file    Transportation needs - medical: Not on file    Transportation needs - non-medical: Not on file   Occupational History    Occupation: student   Tobacco Use    Smoking status: Never Smoker    Smokeless tobacco: Never Used   Substance and Sexual Activity    Alcohol use: No    Drug use: No    Sexual activity: Yes     Partners: Male     Birth  control/protection: OCP   Other Topics Concern    Are you pregnant or think you may be? Not Asked    Breast-feeding Not Asked   Social History Narrative    Not on file       FAMILY HISTORY:  Family History   Problem Relation Age of Onset    Arthritis Father     Melanoma Neg Hx     Psoriasis Neg Hx     Lupus Neg Hx     Eczema Neg Hx        ALLERGIES AND MEDICATIONS: updated and reviewed.  Review of patient's allergies indicates:   Allergen Reactions    Ciprofloxacin Anaphylaxis    Pcn [penicillins]      Current Outpatient Medications   Medication Sig Dispense Refill    epinephrine (EPIPEN 2-KIMANI) 0.3 mg/0.3 mL AtIn Inject Epipen as directed. 2 each 2    escitalopram oxalate (LEXAPRO) 20 MG tablet Take 1 tablet (20 mg total) by mouth once daily. 30 tablet 1    ibuprofen (ADVIL,MOTRIN) 800 MG tablet Take 1 tablet (800 mg total) by mouth daily as needed for Pain (headaches). 30 tablet 0    valACYclovir (VALTREX) 1000 MG tablet Take 1 tablet by mouth daily as needed.       No current facility-administered medications for this visit.        I have reviewed the patient's medical history in detail and updated the computerized patient record.    Review of Systems   Constitutional: Positive for activity change and fatigue. Negative for appetite change, chills and fever.        Patient was exercising, but stopped recently. Feels fatigued all the time.    HENT: Negative for congestion, ear pain, postnasal drip, rhinorrhea, sinus pressure, sinus pain and sore throat.    Eyes: Negative for visual disturbance.   Respiratory: Positive for cough. Negative for shortness of breath.         Cough, non productive.    Cardiovascular: Negative for chest pain and palpitations.   Gastrointestinal: Positive for abdominal pain. Negative for constipation, diarrhea, nausea and vomiting.        Nausea with vomiting when stopped lexapro suddenly, but resolved. Has some sharp pain to LLQ of abdomen that come and go.    Genitourinary:  "Negative for difficulty urinating, dysuria and menstrual problem.   Skin: Negative for rash and wound.   Neurological: Negative for dizziness, numbness and headaches.   Psychiatric/Behavioral: Positive for dysphoric mood and sleep disturbance. Negative for suicidal ideas. The patient is nervous/anxious.         Feels depressed, anxious, some nights feels like she can't turn her brain off at night.          Objective:      Vitals:    12/17/18 1603   BP: 125/85   BP Location: Right arm   Patient Position: Sitting   BP Method: Large (Automatic)   Pulse: 85   Resp: 18   Temp: 98.8 °F (37.1 °C)   Weight: 91.1 kg (200 lb 13.4 oz)   Height: 5' 4" (1.626 m)     Physical Exam   Constitutional: She is oriented to person, place, and time. Vital signs are normal. She appears well-developed. No distress.   Blood pressure 128/85, obese   HENT:   Head: Normocephalic and atraumatic.   Right Ear: Tympanic membrane, external ear and ear canal normal.   Left Ear: Tympanic membrane, external ear and ear canal normal.   Nose: Nose normal. No mucosal edema.   Mouth/Throat: Oropharynx is clear and moist and mucous membranes are normal. No oropharyngeal exudate.   Eyes: Conjunctivae and lids are normal. Pupils are equal, round, and reactive to light. Right eye exhibits no discharge. Left eye exhibits no discharge. Right conjunctiva is not injected. Left conjunctiva is not injected.   Neck: Normal range of motion. Neck supple. Normal carotid pulses present. Carotid bruit is not present.   Cardiovascular: Normal rate, regular rhythm, S1 normal, S2 normal, normal heart sounds, intact distal pulses and normal pulses.   No murmur heard.  Pulses:       Carotid pulses are 2+ on the right side, and 2+ on the left side.       Radial pulses are 2+ on the right side, and 2+ on the left side.        Posterior tibial pulses are 2+ on the right side, and 2+ on the left side.   Pulmonary/Chest: Effort normal and breath sounds normal. No respiratory " distress. She has no decreased breath sounds. She has no wheezes. She has no rhonchi. She has no rales.   Abdominal: Soft. Normal appearance, normal aorta and bowel sounds are normal. She exhibits no distension, no abdominal bruit, no pulsatile midline mass and no mass. There is no hepatosplenomegaly. There is no tenderness. No hernia.   Musculoskeletal: Normal range of motion. She exhibits no edema, tenderness or deformity.   Lymphadenopathy:     She has no cervical adenopathy.        Right: No supraclavicular adenopathy present.        Left: No supraclavicular adenopathy present.   Neurological: She is alert and oriented to person, place, and time. She has normal strength.   Skin: Skin is warm, dry and intact. No rash noted. She is not diaphoretic. No erythema. No pallor.   Psychiatric: She has a normal mood and affect. Her speech is normal and behavior is normal. Judgment and thought content normal. Her mood appears not anxious. Cognition and memory are normal. She does not exhibit a depressed mood.   Nursing note and vitals reviewed.        Assessment:       1. Current moderate episode of major depressive disorder without prior episode    2. Generalized anxiety disorder    3. Rheumatoid arthritis, involving unspecified site, unspecified rheumatoid factor presence    4. Chronic fatigue    5. Screening for lipid disorders    6. BMI 34.0-34.9,adult    7. High risk medication use          Plan:       Albina was seen today for anxiety.    Diagnoses and all orders for this visit:    Current moderate episode of major depressive disorder without prior episode  -     Discontinue: escitalopram oxalate (LEXAPRO) 5 MG Tab; Take 1 tablet (5 mg total) by mouth once daily.  -     TSH; Future  -     CBC auto differential; Future  -     Comprehensive metabolic panel; Future  -     escitalopram oxalate (LEXAPRO) 20 MG tablet; Take 1 tablet (20 mg total) by mouth once daily, increased dose.  - Educational handouts provided. Know  the signs and symptoms of depression; Depression affects your mind and body  - Instructed not to stop lexapro abruptly    Generalized anxiety disorder  -     Discontinue: escitalopram oxalate (LEXAPRO) 5 MG Tab; Take 1 tablet (5 mg total) by mouth once daily.  -     TSH; Future  -     CBC auto differential; Future  -     Comprehensive metabolic panel; Future  -     escitalopram oxalate (LEXAPRO) 20 MG tablet; Take 1 tablet (20 mg total) by mouth once daily, increased dose.  - Educational handouts provided. Understanding generalized anxiety disorder  - Instructed not to stop lexapro abruptly    Rheumatoid arthritis, involving unspecified site, unspecified rheumatoid factor presence  -     CBC auto differential; Future  - Follow up with rheumatology, Dr. Chandra as instructed  - Patient is not currently receiving active treatment    Chronic fatigue  -     CBC auto differential; Future  -     Comprehensive metabolic panel; Future    Screening for lipid disorders  -     Lipid panel; Future    BMI 34.0-34.9,adult   BMI is  34.47 and patient has gained 3 pounds since last visit 11/7/18   Weight loss recommended with well balanced diet and portion controlled meals and increased activity.   High risk medication use  -     CBC auto differential; Future  -     Comprehensive metabolic panel; Future      Follow-up in about 6 weeks (around 1/28/2019) for follow up of depression/anxiety.      Patient readiness: acceptance and barriers:readiness    During the course of the visit the patient was educated and counseled about the following:     Obesity:   General weight loss/lifestyle modification strategies discussed (elicit support from others; identify saboteurs; non-food rewards, etc).  Diet interventions: moderate (500 kCal/d) deficit diet.  Informal exercise measures discussed, e.g. taking stairs instead of elevator.  Regular aerobic exercise program discussed.    Goals: Obesity: Reduce calorie intake and BMI    Did patient  meet goals/outcomes: No    The following self management tools provided: excercise log    Patient Instructions (the written plan) was given to the patient/family.     Time spent with patient: 30 minutes    Barriers to medications present (no )    Adverse reactions to current medications (no)    Over the counter medications reviewed (Yes)

## 2018-12-17 NOTE — PATIENT INSTRUCTIONS
Understanding Generalized Anxiety Disorder (NITO)  Anxiety can fill you with worry and fear. Sometimes anxiety is healthy. It alerts you to a potential threat and gets you to respond and take action. But for some people, anxiety gets so bad it causes problems in daily life. If you find yourself in a constant state of anxiety, you may have an anxiety disorder called generalized anxiety disorder (NITO). Speak with your healthcare provider or mental health professional to learn more. He or she can help.     What is generalized anxiety disorder?  With NITO, you might worry about money, your family and friends, work, or the world in general. You might not even be sure what you're anxious about. But whatever it is, you have an intense fear that the worst will happen. These feelings never really go away. In people age 65 and older, NITO is one of the most commonly diagnosed anxiety disorders.  Many times it occurs with depression. This constant worry affects your quality of life and makes it hard to function. NITO can cause physical symptoms, too.  What are common symptoms of generalized anxiety disorder?  People with NITO often think they have a physical illness. The disorder can cause symptoms, such as:  · Muscle tension, especially in the neck and shoulders  · Nausea and stomach problems  · Frequent headaches  · Feeling lightheaded  · Restlessness, trouble sleeping  · Feeling irritable and on edge all the time  How can generalized anxiety disorder be treated?  NITO can be treated with medicine or therapy (also called counseling), or both. Medicine helps to reduce symptoms, so you can continue with your daily routine. Therapy helps you understand the cause of your anxiety and learn how to manage it. Both forms of treatment help you deal with problems that anxiety causes in your life. This helps you to be healthier and happier.  Date Last Reviewed: 5/1/2017  © 3426-2913 [employer]. [address] [facility],  "[place] [address]. All rights reserved. This information is not intended as a substitute for professional medical care. Always follow your healthcare professional's instructions.        Know the Signs and Symptoms of Depression  Everyone feels down at times. The blues are a natural part of life. But an unhappy period thats intense or lasts for more than a couple of weeks can be a sign of depression.  Depression is a serious illness. It is not a sign of weakness or a "character flaw," and it is not something you can "snap out of." In fact, most people with depression need treatment to get better. Depression can disrupt the lives of family and friends. If you know someone you think may be depressed, find out what you can do to help.    Recognizing signs of depression  People who are depressed may:  · Feel unhappy, sad, blue, down, or miserable nearly every day  · Feel helpless, hopeless, or worthless  · Lose interest in hobbies, friends, and activities that used to give pleasure  · Not sleep well or sleep too much  · Gain or lose weight  · Feel low on energy or constantly tired  · Have a hard time concentrating or making decisions  · Lose interest in sex  · Have physical symptoms, such as stomachaches, headaches, or backaches  Know the serious signals  Never ignore a person's comments about suicide or behaviors that can lead to self-harm. Warning signals for suicide include:  · Threats or talk of suicide  · Statements such as I wont be a problem much longer or Nothing matters  · Giving away possessions or making a will or  arrangements  · Buying a gun or other weapon  · Sudden, unexplained cheerfulness or calm after a period of depression  If you notice any of these signs, get help right away. Call a healthcare professional, mental health clinic, or suicide hotline and ask what action to take. In an emergency, dont hesitate to call the police.  Resources:  · National Institutes of Mental " Srfisl461-050-5018csa.[facility].gov  · National [place] on Mental Iiavuzn995-582-5423gmi.[place].org   · Mental Health Kuczyap326-775-3464xfa.[facility].org  · National Suicide Ccnqkzj358-662-4470 (800-SUICIDE)  · National Suicide Prevention Ovuniunt812-632-3861 (587-098-PIFU)www.suicidepreventionlifeline.org   Date Last Reviewed: 1/1/2017  © 7816-5161 [employer]. [address] [place] [address]. All rights reserved. This information is not intended as a substitute for professional medical care. Always follow your healthcare professional's instructions.        Depression Affects Your Mind and Body  Everyone feels sad or blue from time to time for a few days or weeks. Depression is when these feelings don't go away and they interfere with daily life.  Depression is a real illness that can develop at any age. It is one of the most common mental health problems in the [place] Depression makes you feel sad, helpless, and hopeless. It gets in the way of your life and relationships. It inhibits your ability to think and act. But, with help, you can feel better again.     When I was depressed, I felt awful. I was so tired all the time I could hardly think, but at night I couldnt fall asleep. My head hurt. My stomach hurt. I didnt know what was wrong with me.   Depression affects your whole body  Brain chemicals affect your body as well as your mood. So depression may do more than just make you feel low. You may also feel bad physically. Depression can:  · Cause trouble with mental tasks such as remembering, concentrating, or making decisions  · Make you feel nervous and jumpy  · Cause trouble sleeping. Or you may sleep too much  · Change your appetite  · Cause headaches, stomachaches, or other aches and pains  · Drain your body of energy  Depression and other illness  It is common for people who have chronic health problems to also have depression. It can often be hard to tell which one caused the other.  A person might become depressed after finding out they have a health problem. But some studies suggest being depressed may make certain health problems more likely. And some depressed people stop taking care of themselves. This may make them more likely to get sick.  Date Last Reviewed: 1/1/2017  © 0069-9129 [employer]. [address] [place] [address], [place] [address]. All rights reserved. This information is not intended as a substitute for professional medical care. Always follow your healthcare professional's instructions.

## 2018-12-18 ENCOUNTER — TELEPHONE (OUTPATIENT)
Dept: ADMINISTRATIVE | Facility: HOSPITAL | Age: 23
End: 2018-12-18

## 2018-12-18 PROBLEM — M06.9 RHEUMATOID ARTHRITIS: Status: ACTIVE | Noted: 2018-12-18

## 2018-12-18 PROBLEM — F41.1 GENERALIZED ANXIETY DISORDER: Status: ACTIVE | Noted: 2018-12-18

## 2018-12-18 PROBLEM — F32.1 CURRENT MODERATE EPISODE OF MAJOR DEPRESSIVE DISORDER WITHOUT PRIOR EPISODE: Status: ACTIVE | Noted: 2018-12-18

## 2018-12-18 PROBLEM — R53.82 CHRONIC FATIGUE: Status: ACTIVE | Noted: 2018-12-18

## 2018-12-18 RX ORDER — ESCITALOPRAM OXALATE 20 MG/1
20 TABLET ORAL DAILY
Qty: 30 TABLET | Refills: 1 | Status: SHIPPED | OUTPATIENT
Start: 2018-12-18 | End: 2019-08-08

## 2019-01-16 ENCOUNTER — PATIENT MESSAGE (OUTPATIENT)
Dept: FAMILY MEDICINE | Facility: CLINIC | Age: 24
End: 2019-01-16

## 2019-01-31 LAB
ABO + RH BLD: NORMAL
HBV SURFACE AG SERPL QL IA: NEGATIVE
HCT VFR BLD AUTO: 38 % (ref 36–46)
HGB BLD-MCNC: 12 G/DL (ref 12–16)
HIV-1 AND HIV-2 ANTIBODIES: NEGATIVE
INDIRECT COOMBS: NEGATIVE
RPR: NORMAL
RUBELLA IMMUNE STATUS: NORMAL
URINE CULTURE, ROUTINE: NEGATIVE

## 2019-08-08 ENCOUNTER — HOSPITAL ENCOUNTER (EMERGENCY)
Facility: HOSPITAL | Age: 24
Discharge: HOME | End: 2019-08-08
Attending: EMERGENCY MEDICINE
Payer: MEDICAID

## 2019-08-08 VITALS
SYSTOLIC BLOOD PRESSURE: 124 MMHG | BODY MASS INDEX: 36.19 KG/M2 | WEIGHT: 212 LBS | RESPIRATION RATE: 29 BRPM | TEMPERATURE: 98 F | HEART RATE: 89 BPM | HEIGHT: 64 IN | DIASTOLIC BLOOD PRESSURE: 71 MMHG | OXYGEN SATURATION: 100 %

## 2019-08-08 DIAGNOSIS — R07.9 CHEST PAIN: ICD-10-CM

## 2019-08-08 DIAGNOSIS — R00.2 PALPITATIONS: ICD-10-CM

## 2019-08-08 DIAGNOSIS — E86.0 DEHYDRATION: Primary | ICD-10-CM

## 2019-08-08 LAB
ALBUMIN SERPL BCP-MCNC: 2.7 G/DL (ref 3.5–5.2)
ALP SERPL-CCNC: 158 U/L (ref 55–135)
ALT SERPL W/O P-5'-P-CCNC: 11 U/L (ref 10–44)
ANION GAP SERPL CALC-SCNC: 7 MMOL/L (ref 8–16)
AST SERPL-CCNC: 20 U/L (ref 10–40)
BACTERIA #/AREA URNS HPF: NEGATIVE /HPF
BASOPHILS # BLD AUTO: 0.01 K/UL (ref 0–0.2)
BASOPHILS NFR BLD: 0.2 % (ref 0–1.9)
BILIRUB SERPL-MCNC: 0.5 MG/DL (ref 0.1–1)
BILIRUB UR QL STRIP: NEGATIVE
BNP SERPL-MCNC: 22 PG/ML (ref 0–99)
BUN SERPL-MCNC: 5 MG/DL (ref 6–20)
CALCIUM SERPL-MCNC: 8.1 MG/DL (ref 8.7–10.5)
CHLORIDE SERPL-SCNC: 105 MMOL/L (ref 95–110)
CLARITY UR: CLEAR
CO2 SERPL-SCNC: 22 MMOL/L (ref 23–29)
COLOR UR: YELLOW
CREAT SERPL-MCNC: 0.5 MG/DL (ref 0.5–1.4)
DIFFERENTIAL METHOD: ABNORMAL
EOSINOPHIL # BLD AUTO: 0.1 K/UL (ref 0–0.5)
EOSINOPHIL NFR BLD: 1 % (ref 0–8)
ERYTHROCYTE [DISTWIDTH] IN BLOOD BY AUTOMATED COUNT: 13.3 % (ref 11.5–14.5)
EST. GFR  (AFRICAN AMERICAN): >60 ML/MIN/1.73 M^2
EST. GFR  (NON AFRICAN AMERICAN): >60 ML/MIN/1.73 M^2
GLUCOSE SERPL-MCNC: 108 MG/DL (ref 70–110)
GLUCOSE UR QL STRIP: NEGATIVE
HCG INTACT+B SERPL-ACNC: 5415 MIU/ML
HCT VFR BLD AUTO: 35.6 % (ref 37–48.5)
HGB BLD-MCNC: 12 G/DL (ref 12–16)
HGB UR QL STRIP: NEGATIVE
HYALINE CASTS #/AREA URNS LPF: 6 /LPF
IMM GRANULOCYTES # BLD AUTO: 0.04 K/UL (ref 0–0.04)
IMM GRANULOCYTES NFR BLD AUTO: 0.7 % (ref 0–0.5)
INR PPP: 1
KETONES UR QL STRIP: NEGATIVE
LEUKOCYTE ESTERASE UR QL STRIP: ABNORMAL
LYMPHOCYTES # BLD AUTO: 2.2 K/UL (ref 1–4.8)
LYMPHOCYTES NFR BLD: 36.2 % (ref 18–48)
MCH RBC QN AUTO: 29.6 PG (ref 27–31)
MCHC RBC AUTO-ENTMCNC: 33.7 G/DL (ref 32–36)
MCV RBC AUTO: 88 FL (ref 82–98)
MICROSCOPIC COMMENT: ABNORMAL
MONOCYTES # BLD AUTO: 0.5 K/UL (ref 0.3–1)
MONOCYTES NFR BLD: 7.7 % (ref 4–15)
NEUTROPHILS # BLD AUTO: 3.2 K/UL (ref 1.8–7.7)
NEUTROPHILS NFR BLD: 54.2 % (ref 38–73)
NITRITE UR QL STRIP: NEGATIVE
NRBC BLD-RTO: 0 /100 WBC
PH UR STRIP: 7 [PH] (ref 5–8)
PLATELET # BLD AUTO: 316 K/UL (ref 150–350)
PMV BLD AUTO: 9.9 FL (ref 9.2–12.9)
POTASSIUM SERPL-SCNC: 3.6 MMOL/L (ref 3.5–5.1)
PROT SERPL-MCNC: 6.9 G/DL (ref 6–8.4)
PROT UR QL STRIP: ABNORMAL
PROTHROMBIN TIME: 12.8 SEC (ref 11.7–14)
RBC # BLD AUTO: 4.06 M/UL (ref 4–5.4)
RBC #/AREA URNS HPF: 1 /HPF (ref 0–4)
SODIUM SERPL-SCNC: 134 MMOL/L (ref 136–145)
SP GR UR STRIP: 1.01 (ref 1–1.03)
SQUAMOUS #/AREA URNS HPF: 6 /HPF
TROPONIN I SERPL DL<=0.01 NG/ML-MCNC: <0.03 NG/ML (ref 0.02–0.04)
URN SPEC COLLECT METH UR: ABNORMAL
UROBILINOGEN UR STRIP-ACNC: NEGATIVE EU/DL
WBC # BLD AUTO: 5.96 K/UL (ref 3.9–12.7)
WBC #/AREA URNS HPF: 7 /HPF (ref 0–5)

## 2019-08-08 PROCEDURE — 85025 COMPLETE CBC W/AUTO DIFF WBC: CPT

## 2019-08-08 PROCEDURE — 63600175 PHARM REV CODE 636 W HCPCS: Performed by: EMERGENCY MEDICINE

## 2019-08-08 PROCEDURE — 81001 URINALYSIS AUTO W/SCOPE: CPT

## 2019-08-08 PROCEDURE — 99284 EMERGENCY DEPT VISIT MOD MDM: CPT

## 2019-08-08 PROCEDURE — 84443 ASSAY THYROID STIM HORMONE: CPT

## 2019-08-08 PROCEDURE — 36415 COLL VENOUS BLD VENIPUNCTURE: CPT

## 2019-08-08 PROCEDURE — 84484 ASSAY OF TROPONIN QUANT: CPT

## 2019-08-08 PROCEDURE — 93005 ELECTROCARDIOGRAM TRACING: CPT

## 2019-08-08 PROCEDURE — 96360 HYDRATION IV INFUSION INIT: CPT

## 2019-08-08 PROCEDURE — 84702 CHORIONIC GONADOTROPIN TEST: CPT

## 2019-08-08 PROCEDURE — 83880 ASSAY OF NATRIURETIC PEPTIDE: CPT

## 2019-08-08 PROCEDURE — 96361 HYDRATE IV INFUSION ADD-ON: CPT

## 2019-08-08 PROCEDURE — 80053 COMPREHEN METABOLIC PANEL: CPT

## 2019-08-08 PROCEDURE — 85610 PROTHROMBIN TIME: CPT

## 2019-08-08 RX ORDER — ESOMEPRAZOLE MAGNESIUM 40 MG/1
40 CAPSULE, DELAYED RELEASE ORAL
COMMUNITY
End: 2021-08-11

## 2019-08-08 RX ADMIN — SODIUM CHLORIDE 1000 ML: 0.9 INJECTION, SOLUTION INTRAVENOUS at 08:08

## 2019-08-09 LAB — TSH SERPL DL<=0.005 MIU/L-ACNC: 2.53 UIU/ML (ref 0.34–5.6)

## 2019-08-09 NOTE — ED NOTES
Patient placed on continuous cardiac monitor, automatic blood pressure cuff and continuous pulse oximeter. Family at bedside.

## 2019-08-09 NOTE — ED NOTES
Orthostatic Vital Signs for Albina Beckham:       BP  Pulse    Lyin/73             80  Sitting   120/80  85  Standing  123/81             97    Pulse Ox:  100%

## 2019-08-09 NOTE — ED PROVIDER NOTES
Encounter Date: 8/8/2019       History     Chief Complaint   Patient presents with    Palpitations     palpitations she is 34 wks pregnant     23-year-old female presents complaining of palpitations, patient reports that she began experiencing these symptoms 2-3 days ago, patient reports that these symptoms are intermittent and have been resolving spontaneously, yesterday she reports that episode that awoke her from sleep.  She spoke with her OBGYN regarding the symptoms as she is currently 34 weeks pregnant, OB gyn sent her to the ER for evaluation.        Review of patient's allergies indicates:   Allergen Reactions    Ciprofloxacin Anaphylaxis    Pcn [penicillins]      Past Medical History:   Diagnosis Date    Juvenile arthritis     Juvenile rheumatoid arthritis      Past Surgical History:   Procedure Laterality Date    KNEE SURGERY Left     reconstructive    rheuamtoid nodole removed       Family History   Problem Relation Age of Onset    Arthritis Father     Melanoma Neg Hx     Psoriasis Neg Hx     Lupus Neg Hx     Eczema Neg Hx      Social History     Tobacco Use    Smoking status: Never Smoker    Smokeless tobacco: Never Used   Substance Use Topics    Alcohol use: No    Drug use: No     Review of Systems   Constitutional: Negative for fever.   HENT: Negative for congestion, rhinorrhea, sore throat and trouble swallowing.    Eyes: Negative for visual disturbance.   Respiratory: Negative for cough, chest tightness, shortness of breath and wheezing.    Cardiovascular: Positive for palpitations. Negative for chest pain and leg swelling.   Gastrointestinal: Negative for abdominal distention, abdominal pain, constipation, diarrhea, nausea and vomiting.   Genitourinary: Negative for difficulty urinating, dysuria, flank pain and frequency.   Musculoskeletal: Negative for arthralgias, back pain, joint swelling and neck pain.   Skin: Negative for color change and rash.   Neurological: Negative for  dizziness, syncope, speech difficulty, weakness, numbness and headaches.   All other systems reviewed and are negative.      Physical Exam     Initial Vitals [08/08/19 1751]   BP Pulse Resp Temp SpO2   120/68 (!) 114 17 98.2 °F (36.8 °C) 98 %      MAP       --         Physical Exam    Nursing note and vitals reviewed.  Constitutional: She appears well-developed and well-nourished. She is not diaphoretic. No distress.   HENT:   Head: Normocephalic and atraumatic.   Right Ear: External ear normal.   Left Ear: External ear normal.   Nose: Nose normal.   Mouth/Throat: Oropharynx is clear and moist. No oropharyngeal exudate.   Eyes: Conjunctivae and EOM are normal. Pupils are equal, round, and reactive to light. Right eye exhibits no discharge. Left eye exhibits no discharge. No scleral icterus.   Neck: Normal range of motion. Neck supple. No thyromegaly present. No tracheal deviation present. No JVD present.   Cardiovascular: Normal rate, regular rhythm, normal heart sounds and intact distal pulses. Exam reveals no gallop and no friction rub.    No murmur heard.  Pulmonary/Chest: Breath sounds normal. No stridor. No respiratory distress. She has no wheezes. She has no rhonchi. She has no rales. She exhibits no tenderness.   Abdominal: Soft. Bowel sounds are normal. She exhibits no distension and no mass. There is no tenderness. There is no rebound and no guarding.   Musculoskeletal: Normal range of motion. She exhibits no edema or tenderness.   Lymphadenopathy:     She has no cervical adenopathy.   Neurological: She is alert and oriented to person, place, and time. She has normal strength. She displays normal reflexes. No cranial nerve deficit or sensory deficit.   Skin: Skin is warm and dry. No rash and no abscess noted. No erythema. No pallor.         ED Course   Procedures  Labs Reviewed   CBC W/ AUTO DIFFERENTIAL - Abnormal; Notable for the following components:       Result Value    Hematocrit 35.6 (*)     Immature  Granulocytes 0.7 (*)     All other components within normal limits   COMPREHENSIVE METABOLIC PANEL - Abnormal; Notable for the following components:    Sodium 134 (*)     CO2 22 (*)     BUN, Bld 5 (*)     Calcium 8.1 (*)     Albumin 2.7 (*)     Alkaline Phosphatase 158 (*)     Anion Gap 7 (*)     All other components within normal limits   URINALYSIS, REFLEX TO URINE CULTURE - Abnormal; Notable for the following components:    Protein, UA Trace (*)     Leukocytes, UA 1+ (*)     All other components within normal limits    Narrative:     Preferred Collection Type->Urine, Clean Catch  Specimen Source->Urine   URINALYSIS MICROSCOPIC - Abnormal; Notable for the following components:    WBC, UA 7 (*)     Hyaline Casts, UA 6 (*)     All other components within normal limits    Narrative:     Preferred Collection Type->Urine, Clean Catch  Specimen Source->Urine   TROPONIN I   B-TYPE NATRIURETIC PEPTIDE   PROTIME-INR   HCG, QUANTITATIVE, PREGNANCY   TSH   TSH        ECG Results          EKG 12-lead (In process)  Result time 08/08/19 18:00:46    In process by Interface, Lab In [facility] (08/08/19 18:00:46)                 Narrative:    Test Reason : R07.9,    Vent. Rate : 124 BPM     Atrial Rate : 124 BPM     P-R Int : 134 ms          QRS Dur : 080 ms      QT Int : 328 ms       P-R-T Axes : 042 027 040 degrees     QTc Int : 471 ms    Sinus tachycardia  Otherwise normal ECG  No previous ECGs available    Referred By:             Confirmed By:                             Imaging Results    None          Medical Decision Making:   History:   Old Medical Records: I decided to obtain old medical records.  Initial Assessment:   Emergent evaluation of a 23-year-old female presenting with palpitations, on physical exam patient has no acute abnormalities patient has normal heart rate and is not currently experiencing palpitations.  Differential diagnosis is broad but includes dehydration, electrolyte abnormality, endocrine dysfunction,  infection, PE              Attending Attestation:             Attending ED Notes:   Patient reports that she is feeling better and has had no further episodes of palpitations since arriving in the emergency department.  In ER, patient reports she does not want to get the recommended CTA of the chest to evaluate for PE.  Patient is alert and oriented times for patient appears to have capacity to make her own medical decisions patient understands that by refusing she limits our diagnostic capabilities and that there is a risk of death, disability, deterioration.  Patient understands that she can change her mind at any time and return.  Patient reports that she is feeling better after IV hydration she feels that she was just dehydrated patient intends to drink more frequently and she will follow up with her OBGYN in the next day or 2 for re-evaluation patient is advised to return immediately to the ER for any worsening or for any further concerns             Clinical Impression:       ICD-10-CM ICD-9-CM   1. Dehydration E86.0 276.51   2. Chest pain R07.9 786.50   3. Palpitations R00.2 785.1                                Zak Garcias MD  08/09/19 0214

## 2019-08-09 NOTE — ED NOTES
"Pt states she started having palpitations this weekend. It's been off and on since then. Last night she woke up from sleep gasping for air. She stated " She felt like she wasn't breathing." She said while she is awake it feels like her heart is beating weird and when her heart skips that's when she feels like she can't breath for a second."  "

## 2019-08-20 ENCOUNTER — HOSPITAL ENCOUNTER (OUTPATIENT)
Facility: HOSPITAL | Age: 24
Discharge: HOME | End: 2019-08-20
Attending: OBSTETRICS & GYNECOLOGY | Admitting: OBSTETRICS & GYNECOLOGY
Payer: MEDICAID

## 2019-08-20 DIAGNOSIS — O36.5931 IUGR (INTRAUTERINE GROWTH RESTRICTION) AFFECTING CARE OF MOTHER, THIRD TRIMESTER, FETUS 1: ICD-10-CM

## 2019-08-20 PROCEDURE — 59025 FETAL NON-STRESS TEST: CPT

## 2019-08-20 NOTE — DISCHARGE INSTRUCTIONS
Call your Doctor if you have any of the following:  Temperature above 100 degrees  Nausea, vomiting and/or diarrhea  Severe headache, dizziness, or blurred vision  Notable increase in swelling of hands or feet  Notable swelling of face and lips  Difficulty, pain or burning with urination  Foul smelling vaginal discharge  Decreased fetal movement    Come to the hospital if you have any of the following symptoms:  Your water breaks  More than 4-6 contractions in 1 hour for 2 or more hours  Vaginal bleeding like a period    After 28 weeks, you should feel 10 distinct fetal movements within a 2 hour period.    It is recommended that you drink 1/2 a gallon of water each day.  Tea, Soda and Juice are  in addition to this.

## 2019-08-23 ENCOUNTER — HOSPITAL ENCOUNTER (OUTPATIENT)
Dept: OBSTETRICS AND GYNECOLOGY | Facility: HOSPITAL | Age: 24
Discharge: HOME | End: 2019-08-23
Attending: OBSTETRICS & GYNECOLOGY | Admitting: OBSTETRICS & GYNECOLOGY
Payer: MEDICAID

## 2019-08-23 DIAGNOSIS — O36.5930 INTRAUTERINE GROWTH RESTRICTION (IUGR) AFFECTING CARE OF MOTHER, THIRD TRIMESTER, SINGLE GESTATION: ICD-10-CM

## 2019-08-23 PROCEDURE — 59025 FETAL NON-STRESS TEST: CPT

## 2019-08-27 ENCOUNTER — HOSPITAL ENCOUNTER (OUTPATIENT)
Dept: OBSTETRICS AND GYNECOLOGY | Facility: HOSPITAL | Age: 24
Discharge: HOME | End: 2019-08-27
Attending: OBSTETRICS & GYNECOLOGY | Admitting: OBSTETRICS & GYNECOLOGY
Payer: MEDICAID

## 2019-08-27 DIAGNOSIS — O36.5990 IUGR, ANTENATAL: ICD-10-CM

## 2019-08-27 PROCEDURE — 59025 FETAL NON-STRESS TEST: CPT

## 2019-08-30 ENCOUNTER — HOSPITAL ENCOUNTER (OUTPATIENT)
Dept: OBSTETRICS AND GYNECOLOGY | Facility: HOSPITAL | Age: 24
Discharge: HOME | End: 2019-08-30
Attending: OBSTETRICS & GYNECOLOGY | Admitting: OBSTETRICS & GYNECOLOGY
Payer: MEDICAID

## 2019-08-30 DIAGNOSIS — O36.5930 INTRAUTERINE GROWTH RESTRICTION (IUGR) AFFECTING CARE OF MOTHER, THIRD TRIMESTER, SINGLE GESTATION: ICD-10-CM

## 2019-08-30 PROCEDURE — 59025 FETAL NON-STRESS TEST: CPT

## 2019-09-03 ENCOUNTER — HOSPITAL ENCOUNTER (OUTPATIENT)
Dept: OBSTETRICS AND GYNECOLOGY | Facility: HOSPITAL | Age: 24
Discharge: HOME | End: 2019-09-03
Attending: OBSTETRICS & GYNECOLOGY | Admitting: OBSTETRICS & GYNECOLOGY
Payer: MEDICAID

## 2019-09-03 VITALS — DIASTOLIC BLOOD PRESSURE: 71 MMHG | SYSTOLIC BLOOD PRESSURE: 123 MMHG | HEART RATE: 96 BPM

## 2019-09-03 DIAGNOSIS — O36.5990 IUGR (INTRAUTERINE GROWTH RESTRICTION) AFFECTING CARE OF MOTHER: ICD-10-CM

## 2019-09-03 PROCEDURE — 59025 FETAL NON-STRESS TEST: CPT

## 2019-09-06 ENCOUNTER — HOSPITAL ENCOUNTER (OUTPATIENT)
Dept: OBSTETRICS AND GYNECOLOGY | Facility: HOSPITAL | Age: 24
Discharge: HOME | End: 2019-09-06
Attending: OBSTETRICS & GYNECOLOGY | Admitting: OBSTETRICS & GYNECOLOGY
Payer: MEDICAID

## 2019-09-06 DIAGNOSIS — O36.5990 INTRAUTERINE GROWTH RESTRICTION (IUGR) AFFECTING CARE OF MOTHER: ICD-10-CM

## 2019-09-06 PROCEDURE — 59025 FETAL NON-STRESS TEST: CPT

## 2019-09-06 NOTE — DISCHARGE INSTRUCTIONS
Call your Doctor if you have any of the following:  Temperature above 100 degrees  Nausea, vomiting and/or diarrhea  Severe headache, dizziness, or blurred vision  Notable increase in swelling of hands or feet  Notable swelling of face and lips  Difficulty, pain or burning with urination  Foul smelling vaginal discharge  Decreased fetal movement    Come to the hospital if you have any of the following symptoms:  Your water breaks    Vaginal bleeding like a period    After 28 weeks, you should feel 10 distinct fetal movements within a 2 hour period.    It is recommended that you drink 1/2 a gallon of water each day.  Tea, Soda and Juice are  in addition to this.

## 2019-09-10 PROBLEM — O36.5990 INTRAUTERINE GROWTH RESTRICTION AFFECTING ANTEPARTUM CARE OF MOTHER: Status: ACTIVE | Noted: 2019-09-10

## 2019-09-11 ENCOUNTER — HOSPITAL ENCOUNTER (INPATIENT)
Facility: HOSPITAL | Age: 24
LOS: 3 days | Discharge: HOME | End: 2019-09-14
Attending: OBSTETRICS & GYNECOLOGY | Admitting: OBSTETRICS & GYNECOLOGY
Payer: MEDICAID

## 2019-09-11 DIAGNOSIS — R07.89 CHEST TIGHTNESS: ICD-10-CM

## 2019-09-11 DIAGNOSIS — Z78.9 ADMITTED TO LABOR AND DELIVERY: ICD-10-CM

## 2019-09-11 LAB
ABO + RH BLD: NORMAL
BASOPHILS # BLD AUTO: 0.03 K/UL (ref 0–0.2)
BASOPHILS NFR BLD: 0.5 % (ref 0–1.9)
BILIRUB UR QL STRIP: NEGATIVE
BLD GP AB SCN CELLS X3 SERPL QL: NORMAL
CLARITY UR: CLEAR
COLOR UR: YELLOW
DIFFERENTIAL METHOD: ABNORMAL
EOSINOPHIL # BLD AUTO: 0.1 K/UL (ref 0–0.5)
EOSINOPHIL NFR BLD: 1 % (ref 0–8)
ERYTHROCYTE [DISTWIDTH] IN BLOOD BY AUTOMATED COUNT: 13.9 % (ref 11.5–14.5)
GLUCOSE UR QL STRIP: NEGATIVE
HCT VFR BLD AUTO: 35.4 % (ref 37–48.5)
HGB BLD-MCNC: 12 G/DL (ref 12–16)
HGB UR QL STRIP: NEGATIVE
IMM GRANULOCYTES # BLD AUTO: 0.04 K/UL (ref 0–0.04)
IMM GRANULOCYTES NFR BLD AUTO: 0.6 % (ref 0–0.5)
KETONES UR QL STRIP: NEGATIVE
LEUKOCYTE ESTERASE UR QL STRIP: NEGATIVE
LYMPHOCYTES # BLD AUTO: 2.1 K/UL (ref 1–4.8)
LYMPHOCYTES NFR BLD: 33.6 % (ref 18–48)
MCH RBC QN AUTO: 30.1 PG (ref 27–31)
MCHC RBC AUTO-ENTMCNC: 33.9 G/DL (ref 32–36)
MCV RBC AUTO: 89 FL (ref 82–98)
MONOCYTES # BLD AUTO: 0.6 K/UL (ref 0.3–1)
MONOCYTES NFR BLD: 8.8 % (ref 4–15)
NEUTROPHILS # BLD AUTO: 3.5 K/UL (ref 1.8–7.7)
NEUTROPHILS NFR BLD: 55.5 % (ref 38–73)
NITRITE UR QL STRIP: NEGATIVE
NRBC BLD-RTO: 0 /100 WBC
PH UR STRIP: 6 [PH] (ref 5–8)
PLATELET # BLD AUTO: 291 K/UL (ref 150–350)
PMV BLD AUTO: 10.9 FL (ref 9.2–12.9)
PROT UR QL STRIP: ABNORMAL
RBC # BLD AUTO: 3.99 M/UL (ref 4–5.4)
SP GR UR STRIP: 1.03 (ref 1–1.03)
URN SPEC COLLECT METH UR: ABNORMAL
UROBILINOGEN UR STRIP-ACNC: ABNORMAL EU/DL
WBC # BLD AUTO: 6.22 K/UL (ref 3.9–12.7)

## 2019-09-11 PROCEDURE — 86901 BLOOD TYPING SEROLOGIC RH(D): CPT

## 2019-09-11 PROCEDURE — 86592 SYPHILIS TEST NON-TREP QUAL: CPT

## 2019-09-11 PROCEDURE — 25000003 PHARM REV CODE 250: Performed by: OBSTETRICS & GYNECOLOGY

## 2019-09-11 PROCEDURE — 81003 URINALYSIS AUTO W/O SCOPE: CPT

## 2019-09-11 PROCEDURE — 12000002 HC ACUTE/MED SURGE SEMI-PRIVATE ROOM

## 2019-09-11 PROCEDURE — 85025 COMPLETE CBC W/AUTO DIFF WBC: CPT

## 2019-09-11 RX ORDER — ZOLPIDEM TARTRATE 5 MG/1
5 TABLET ORAL NIGHTLY PRN
Status: DISCONTINUED | OUTPATIENT
Start: 2019-09-11 | End: 2019-09-13

## 2019-09-11 RX ORDER — ONDANSETRON 4 MG/1
8 TABLET, ORALLY DISINTEGRATING ORAL EVERY 8 HOURS PRN
Status: DISCONTINUED | OUTPATIENT
Start: 2019-09-11 | End: 2019-09-12

## 2019-09-11 RX ORDER — CALCIUM CARBONATE 200(500)MG
500 TABLET,CHEWABLE ORAL 3 TIMES DAILY PRN
Status: DISCONTINUED | OUTPATIENT
Start: 2019-09-11 | End: 2019-09-13

## 2019-09-11 RX ORDER — BUTORPHANOL TARTRATE 2 MG/ML
1 INJECTION INTRAMUSCULAR; INTRAVENOUS
Status: DISCONTINUED | OUTPATIENT
Start: 2019-09-11 | End: 2019-09-13

## 2019-09-11 RX ORDER — VALACYCLOVIR HYDROCHLORIDE 1 G/1
1000 TABLET, FILM COATED ORAL DAILY
Status: ON HOLD | COMMUNITY
End: 2019-09-14 | Stop reason: HOSPADM

## 2019-09-11 RX ORDER — OXYTOCIN-SODIUM CHLORIDE 0.9% IV SOLN 30 UNIT/500ML 30-0.9/5 UT/ML-%
2 SOLUTION INTRAVENOUS CONTINUOUS
Status: DISCONTINUED | OUTPATIENT
Start: 2019-09-12 | End: 2019-09-12

## 2019-09-11 RX ORDER — SODIUM CHLORIDE, SODIUM LACTATE, POTASSIUM CHLORIDE, CALCIUM CHLORIDE 600; 310; 30; 20 MG/100ML; MG/100ML; MG/100ML; MG/100ML
INJECTION, SOLUTION INTRAVENOUS CONTINUOUS
Status: DISCONTINUED | OUTPATIENT
Start: 2019-09-11 | End: 2019-09-12

## 2019-09-11 RX ORDER — SODIUM CHLORIDE 9 MG/ML
INJECTION, SOLUTION INTRAVENOUS
Status: DISCONTINUED | OUTPATIENT
Start: 2019-09-11 | End: 2019-09-13

## 2019-09-11 RX ORDER — SODIUM CHLORIDE, SODIUM LACTATE, POTASSIUM CHLORIDE, CALCIUM CHLORIDE 600; 310; 30; 20 MG/100ML; MG/100ML; MG/100ML; MG/100ML
INJECTION, SOLUTION INTRAVENOUS CONTINUOUS
Status: DISCONTINUED | OUTPATIENT
Start: 2019-09-11 | End: 2019-09-11

## 2019-09-11 RX ORDER — BUTORPHANOL TARTRATE 2 MG/ML
1 INJECTION INTRAMUSCULAR; INTRAVENOUS
Status: DISCONTINUED | OUTPATIENT
Start: 2019-09-11 | End: 2019-09-11

## 2019-09-11 RX ADMIN — DINOPROSTONE 10 MG: 10 INSERT VAGINAL at 09:09

## 2019-09-11 RX ADMIN — ZOLPIDEM TARTRATE 5 MG: 5 TABLET ORAL at 10:09

## 2019-09-12 ENCOUNTER — ANESTHESIA EVENT (OUTPATIENT)
Dept: OBSTETRICS AND GYNECOLOGY | Facility: HOSPITAL | Age: 24
End: 2019-09-12
Payer: MEDICAID

## 2019-09-12 ENCOUNTER — ANESTHESIA (OUTPATIENT)
Dept: OBSTETRICS AND GYNECOLOGY | Facility: HOSPITAL | Age: 24
End: 2019-09-12
Payer: MEDICAID

## 2019-09-12 LAB — RPR SER QL: NORMAL

## 2019-09-12 PROCEDURE — 12000002 HC ACUTE/MED SURGE SEMI-PRIVATE ROOM

## 2019-09-12 PROCEDURE — 72200004 HC VAGINAL DELIVERY LEVEL I

## 2019-09-12 PROCEDURE — 63600175 PHARM REV CODE 636 W HCPCS: Performed by: OBSTETRICS & GYNECOLOGY

## 2019-09-12 PROCEDURE — 62326 NJX INTERLAMINAR LMBR/SAC: CPT | Performed by: STUDENT IN AN ORGANIZED HEALTH CARE EDUCATION/TRAINING PROGRAM

## 2019-09-12 PROCEDURE — 25000003 PHARM REV CODE 250: Performed by: OBSTETRICS & GYNECOLOGY

## 2019-09-12 PROCEDURE — 25000003 PHARM REV CODE 250: Performed by: STUDENT IN AN ORGANIZED HEALTH CARE EDUCATION/TRAINING PROGRAM

## 2019-09-12 PROCEDURE — C1751 CATH, INF, PER/CENT/MIDLINE: HCPCS | Performed by: STUDENT IN AN ORGANIZED HEALTH CARE EDUCATION/TRAINING PROGRAM

## 2019-09-12 RX ORDER — LIDOCAINE HYDROCHLORIDE 10 MG/ML
INJECTION, SOLUTION EPIDURAL; INFILTRATION; INTRACAUDAL; PERINEURAL
Status: COMPLETED | OUTPATIENT
Start: 2019-09-12 | End: 2019-09-12

## 2019-09-12 RX ORDER — EPHEDRINE SULFATE 50 MG/ML
10 INJECTION, SOLUTION INTRAVENOUS ONCE AS NEEDED
Status: DISCONTINUED | OUTPATIENT
Start: 2019-09-12 | End: 2019-09-12

## 2019-09-12 RX ORDER — DIPHENHYDRAMINE HYDROCHLORIDE 50 MG/ML
12.5 INJECTION INTRAMUSCULAR; INTRAVENOUS EVERY 4 HOURS PRN
Status: DISCONTINUED | OUTPATIENT
Start: 2019-09-12 | End: 2019-09-12

## 2019-09-12 RX ORDER — OXYCODONE AND ACETAMINOPHEN 5; 325 MG/1; MG/1
1 TABLET ORAL EVERY 4 HOURS PRN
Status: DISCONTINUED | OUTPATIENT
Start: 2019-09-12 | End: 2019-09-14 | Stop reason: HOSPADM

## 2019-09-12 RX ORDER — FENTANYL/BUPIVACAINE/NS/PF 2-625MCG/1
PLASTIC BAG, INJECTION (ML) INJECTION CONTINUOUS
Status: DISCONTINUED | OUTPATIENT
Start: 2019-09-12 | End: 2019-09-12

## 2019-09-12 RX ORDER — PRENATAL WITH FERROUS FUM AND FOLIC ACID 3080; 920; 120; 400; 22; 1.84; 3; 20; 10; 1; 12; 200; 27; 25; 2 [IU]/1; [IU]/1; MG/1; [IU]/1; MG/1; MG/1; MG/1; MG/1; MG/1; MG/1; UG/1; MG/1; MG/1; MG/1; MG/1
1 TABLET ORAL DAILY
Status: DISCONTINUED | OUTPATIENT
Start: 2019-09-12 | End: 2019-09-14 | Stop reason: HOSPADM

## 2019-09-12 RX ORDER — FENTANYL/BUPIVACAINE/NS/PF 2MCG/ML-.1
PLASTIC BAG, INJECTION (ML) INJECTION CONTINUOUS PRN
Status: DISCONTINUED | OUTPATIENT
Start: 2019-09-12 | End: 2019-09-12

## 2019-09-12 RX ORDER — OXYCODONE AND ACETAMINOPHEN 10; 325 MG/1; MG/1
1 TABLET ORAL EVERY 4 HOURS PRN
Status: DISCONTINUED | OUTPATIENT
Start: 2019-09-12 | End: 2019-09-14 | Stop reason: HOSPADM

## 2019-09-12 RX ORDER — SIMETHICONE 80 MG
1 TABLET,CHEWABLE ORAL EVERY 6 HOURS PRN
Status: DISCONTINUED | OUTPATIENT
Start: 2019-09-12 | End: 2019-09-14 | Stop reason: HOSPADM

## 2019-09-12 RX ORDER — HYDROCORTISONE 25 MG/G
CREAM TOPICAL 3 TIMES DAILY PRN
Status: DISCONTINUED | OUTPATIENT
Start: 2019-09-12 | End: 2019-09-14 | Stop reason: HOSPADM

## 2019-09-12 RX ORDER — DIPHENHYDRAMINE HCL 25 MG
25 CAPSULE ORAL EVERY 4 HOURS PRN
Status: DISCONTINUED | OUTPATIENT
Start: 2019-09-12 | End: 2019-09-14 | Stop reason: HOSPADM

## 2019-09-12 RX ORDER — DOCUSATE SODIUM 100 MG/1
200 CAPSULE, LIQUID FILLED ORAL 2 TIMES DAILY PRN
Status: DISCONTINUED | OUTPATIENT
Start: 2019-09-12 | End: 2019-09-14 | Stop reason: HOSPADM

## 2019-09-12 RX ORDER — NALOXONE HCL 0.4 MG/ML
0.4 VIAL (ML) INJECTION SEE ADMIN INSTRUCTIONS
Status: DISCONTINUED | OUTPATIENT
Start: 2019-09-12 | End: 2019-09-13

## 2019-09-12 RX ORDER — ONDANSETRON 2 MG/ML
4 INJECTION INTRAMUSCULAR; INTRAVENOUS ONCE
Status: DISCONTINUED | OUTPATIENT
Start: 2019-09-12 | End: 2019-09-13

## 2019-09-12 RX ORDER — ONDANSETRON 4 MG/1
8 TABLET, ORALLY DISINTEGRATING ORAL EVERY 8 HOURS PRN
Status: DISCONTINUED | OUTPATIENT
Start: 2019-09-12 | End: 2019-09-14 | Stop reason: HOSPADM

## 2019-09-12 RX ADMIN — PROMETHAZINE HYDROCHLORIDE 12.5 MG: 25 INJECTION INTRAMUSCULAR; INTRAVENOUS at 03:09

## 2019-09-12 RX ADMIN — Medication 2 MILLI-UNITS/MIN: at 09:09

## 2019-09-12 RX ADMIN — SODIUM CHLORIDE, SODIUM LACTATE, POTASSIUM CHLORIDE, AND CALCIUM CHLORIDE: .6; .31; .03; .02 INJECTION, SOLUTION INTRAVENOUS at 09:09

## 2019-09-12 RX ADMIN — SODIUM CHLORIDE, SODIUM LACTATE, POTASSIUM CHLORIDE, AND CALCIUM CHLORIDE: .6; .31; .03; .02 INJECTION, SOLUTION INTRAVENOUS at 07:09

## 2019-09-12 RX ADMIN — SODIUM CHLORIDE, SODIUM LACTATE, POTASSIUM CHLORIDE, AND CALCIUM CHLORIDE: .6; .31; .03; .02 INJECTION, SOLUTION INTRAVENOUS at 03:09

## 2019-09-12 RX ADMIN — BUTORPHANOL TARTRATE 1 MG: 2 INJECTION, SOLUTION INTRAMUSCULAR; INTRAVENOUS at 06:09

## 2019-09-12 RX ADMIN — Medication: at 08:09

## 2019-09-12 RX ADMIN — BUTORPHANOL TARTRATE 1 MG: 2 INJECTION, SOLUTION INTRAMUSCULAR; INTRAVENOUS at 03:09

## 2019-09-12 RX ADMIN — LIDOCAINE HYDROCHLORIDE 20 MG: 10 INJECTION, SOLUTION EPIDURAL; INFILTRATION; INTRACAUDAL; PERINEURAL at 08:09

## 2019-09-12 RX ADMIN — IBUPROFEN 600 MG: 400 TABLET, FILM COATED ORAL at 08:09

## 2019-09-12 RX ADMIN — SODIUM CHLORIDE, SODIUM LACTATE, POTASSIUM CHLORIDE, AND CALCIUM CHLORIDE: .6; .31; .03; .02 INJECTION, SOLUTION INTRAVENOUS at 12:09

## 2019-09-12 RX ADMIN — Medication: at 09:09

## 2019-09-12 RX ADMIN — Medication 14 ML/HR: at 08:09

## 2019-09-12 RX ADMIN — IBUPROFEN 600 MG: 400 TABLET, FILM COATED ORAL at 03:09

## 2019-09-12 RX ADMIN — BUTORPHANOL TARTRATE 1 MG: 2 INJECTION, SOLUTION INTRAMUSCULAR; INTRAVENOUS at 01:09

## 2019-09-12 NOTE — PROGRESS NOTES
[facility]  Obstetrics  Labor Progress Note    Patient Name: Albina Beckham  MRN: [id number]  Admission Date: 2019  Hospital Length of Stay: 1 days  Attending Physician: Coral Nicholas MD  Primary Care Provider: Primary Doctor No    Subjective:     Principal Problem:Admitted to labor and delivery    Hospital Course:  No notes on file    Interval History:  Albina is a 24 y.o.  at 39w0d. She is doing well. Pt is comfortable after epidural    Objective:     Vital Signs (Most Recent):  Temp: 97.1 °F (36.2 °C) (19 075)  Pulse: 96 (19)  Resp: 18 (19)  BP: (!) 134/57 (19 0841)  SpO2: 98 % (19 0149) Vital Signs (24h Range):  Temp:  [97.1 °F (36.2 °C)-98.3 °F (36.8 °C)] 97.1 °F (36.2 °C)  Pulse:  [] 96  Resp:  [17-18] 18  SpO2:  [98 %] 98 %  BP: (118-147)/(57-86) 134/57     Weight: 100.2 kg (221 lb)  Body mass index is 37.93 kg/m².    FHT: Cat 1 (reassuring)  TOCO: irregular    Cervical Exam:  Dilation:  5  Effacement:  90  Station: -2  Presentation: Vertex     Significant Labs:  Lab Results   Component Value Date    GROUPTRH A POS 2019    HEPBSAG Negative 2019       I have personallly reviewed all pertinent lab results from the last 24 hours.    Physical Exam    Assessment/Plan:     24 y.o. female  at 39w0d for:    * Admitted to labor and delivery  IUP at 39 wga  S<D  For IOL, good progress    AROM - clear.  Continue pitocin          Coral Nicholas MD  Obstetrics  [facility]

## 2019-09-12 NOTE — PROGRESS NOTES
[facility]  Obstetrics  Labor Progress Note    Patient Name: Albina Beckham  MRN: [id number]  Admission Date: 2019  Hospital Length of Stay: 1 days  Attending Physician: Coral Nicholas MD  Primary Care Provider: Primary Doctor No    Subjective:     Principal Problem:Admitted to labor and delivery    Hospital Course:  No notes on file    Interval History:  Albina is a 24 y.o.  at 39w0d. She is doing well. Patient reports pain with contractions.  Cervidil in place.      Objective:     Vital Signs (Most Recent):  Temp: 98 °F (36.7 °C) (19)  Pulse: 79 (19)  Resp: 18 (19)  BP: 134/84 (19)  SpO2: 98 % (19 0149) Vital Signs (24h Range):  Temp:  [98 °F (36.7 °C)-98.3 °F (36.8 °C)] 98 °F (36.7 °C)  Pulse:  [79-89] 79  Resp:  [17-18] 18  SpO2:  [98 %] 98 %  BP: (118-134)/(63-84) 134/84     Weight: 100.2 kg (221 lb)  Body mass index is 37.93 kg/m².    FHT: Cat 1 (reassuring)  TOCO: Irregular contractions     Cervical Exam:  Dilation:  3  Effacement:  75%  Station: -2  Presentation: Vertex     Significant Labs:  Lab Results   Component Value Date    GROUPTRH A POS 2019    HEPBSAG Negative 2019       I have personallly reviewed all pertinent lab results from the last 24 hours.    Physical Exam    Assessment/Plan:     24 y.o. female  at 39w0d for:    * Admitted to labor and delivery  IUP at 39 wga  S<D  For IOL, good progress    Cervidil out, patient requests epidural  Will AROM and start pitocin after epidural          Coral Nicholas MD  Obstetrics  [facility]

## 2019-09-12 NOTE — PLAN OF CARE
Problem: Adult Inpatient Plan of Care  Goal: Plan of Care Review  Outcome: Ongoing (interventions implemented as appropriate)  Plan of care reviewed with patient. Recovery Plan of care reviewed. Questions encouraged and answered promptly. Will continue to monitor and assist as needed.

## 2019-09-12 NOTE — PLAN OF CARE
Problem: Adult Inpatient Plan of Care  Goal: Plan of Care Review  Outcome: Ongoing (interventions implemented as appropriate)  Pt's VS stable overnight.  Pt requests pain meds as needed for pain management.  Will continue to monitor.

## 2019-09-12 NOTE — ASSESSMENT & PLAN NOTE
IUP at 39 wga  S<D  For IOL, good progress    Cervidil out, patient requests epidural  Will AROM and start pitocin after epidural

## 2019-09-12 NOTE — ANESTHESIA PROCEDURE NOTES
Epidural    Patient location during procedure: OB   Reason for block: primary anesthetic   Diagnosis: Intrauterine Pregnancy   Start time: 9/12/2019 8:15 AM  Timeout: 9/12/2019 8:15 AM  End time: 9/12/2019 8:21 AM    Staffing  Performing Provider: Maik Kaplan MD  Authorizing Provider: Maik Kaplan MD        Preanesthetic Checklist  Completed: patient identified, pre-op evaluation, timeout performed, IV checked, risks and benefits discussed, monitors and equipment checked, anesthesia consent given, hand hygiene performed and patient being monitored  Preparation  Patient position: sitting  Prep: ChloraPrep  Patient monitoring: ECG and Blood Pressure  Epidural  Skin Anesthetic: lidocaine 1%  Skin Wheal: 3 mL  Administration type: single shot  Approach: midline  Interspace: L3-4    Injection technique: JULIO CÉSAR air  Needle and Epidural Catheter  Needle type: Tuohy   Needle gauge: 17  Needle length: 3.5 inches  Needle insertion depth: 5 cm  Catheter type: springwound and multi-orifice  Catheter size: 18 G  Catheter at skin depth: 10 cm  Test dose: 3 mL of lidocaine 1.5% with Epi 1-to-200,000  Additional Documentation: negative aspiration for heme and CSF  Needle localization: anatomical landmarks

## 2019-09-12 NOTE — ANESTHESIA PREPROCEDURE EVALUATION
2019  Albina Beckham is a 24 y.o., female.  Patient Active Problem List   Diagnosis    Obesity (BMI 30.0-34.9)    Generalized anxiety disorder    Current moderate episode of major depressive disorder without prior episode    Rheumatoid arthritis    Chronic fatigue    BMI 34.0-34.9,adult    IUGR (intrauterine growth restriction) affecting care of mother, third trimester, fetus 1    Intrauterine growth restriction (IUGR) affecting care of mother, third trimester, single gestation    IUGR,     IUGR (intrauterine growth restriction) affecting care of mother    Intrauterine growth restriction (IUGR) affecting care of mother    Intrauterine growth restriction affecting antepartum care of mother    Admitted to labor and delivery       Results for orders placed or performed during the hospital encounter of 19   EKG 12-lead    Collection Time: 19  5:59 PM    Narrative    Test Reason : R07.9,    Vent. Rate : 124 BPM     Atrial Rate : 124 BPM     P-R Int : 134 ms          QRS Dur : 080 ms      QT Int : 328 ms       P-R-T Axes : 042 027 040 degrees     QTc Int : 471 ms    Sinus tachycardia  Otherwise normal ECG  No previous ECGs available  Confirmed by Chidi Singh MD ([id number]) on 2019 11:41:05 AM    Referred By:             Confirmed By:Chidi Singh MD        Lab Results   Component Value Date    WBC 6.22 2019    HGB 12.0 2019    HCT 35.4 (L) 2019    MCV 89 2019     2019     BMP  Lab Results   Component Value Date     (L) 2019    K 3.6 2019     2019    CO2 22 (L) 2019    BUN 5 (L) 2019    CREATININE 0.5 2019    CALCIUM 8.1 (L) 2019    ANIONGAP 7 (L) 2019    ESTGFRAFRICA >60.0 2019    EGFRNONAA >60.0 2019           Pre-op Assessment    I have reviewed the Patient  Summary Reports.     I have reviewed the Medications.     Review of Systems  Anesthesia Hx:  No problems with previous Anesthesia Denies Hx of Anesthetic complications  Denies Family Hx of Anesthesia complications.   Denies Personal Hx of Anesthesia complications.   Hematology/Oncology:  Hematology Normal   Oncology Normal     EENT/Dental:EENT/Dental Normal   Cardiovascular:  Cardiovascular Normal     Pulmonary:  Pulmonary Normal    Renal/:  Renal/ Normal     Hepatic/GI:  Hepatic/GI Normal    Musculoskeletal:  Musculoskeletal Normal    Neurological:  Neurology Normal    Endocrine:  Endocrine Normal    Psych:  Psychiatric Normal           Physical Exam  General:  Well nourished    Airway/Jaw/Neck:  Airway Findings: Mouth Opening: Normal Tongue: Normal  General Airway Assessment: Adult  Mallampati: II  TM Distance: Normal, at least 6 cm  Jaw/Neck Findings:  Neck ROM: Normal ROM      Dental:  Dental Findings: In tact   Chest/Lungs:  Chest/Lungs Findings: Clear to auscultation, Normal Respiratory Rate     Heart/Vascular:  Heart Findings: Rate: Normal  Rhythm: Regular Rhythm  Sounds: Normal        Mental Status:  Mental Status Findings:  Alert and Oriented         Anesthesia Plan  Type of Anesthesia, risks & benefits discussed:  Anesthesia Type:  epidural  Patient's Preference:   Intra-op Monitoring Plan: standard ASA monitors  Intra-op Monitoring Plan Comments:   Post Op Pain Control Plan:   Post Op Pain Control Plan Comments:   Induction:    Beta Blocker:         Informed Consent: Patient understands risks and agrees with Anesthesia plan.  Questions answered. Anesthesia consent signed with patient.  ASA Score: 2     Day of Surgery Review of History & Physical:

## 2019-09-12 NOTE — SUBJECTIVE & OBJECTIVE
Interval History:  Albina is a 24 y.o.  at 39w0d. She is doing well. Patient reports pain with contractions.  Cervidil in place.      Objective:     Vital Signs (Most Recent):  Temp: 98 °F (36.7 °C) (19)  Pulse: 79 (19)  Resp: 18 (19)  BP: 134/84 (19)  SpO2: 98 % (19 0149) Vital Signs (24h Range):  Temp:  [98 °F (36.7 °C)-98.3 °F (36.8 °C)] 98 °F (36.7 °C)  Pulse:  [79-89] 79  Resp:  [17-18] 18  SpO2:  [98 %] 98 %  BP: (118-134)/(63-84) 134/84     Weight: 100.2 kg (221 lb)  Body mass index is 37.93 kg/m².    FHT: Cat 1 (reassuring)  TOCO: Irregular contractions     Cervical Exam:  Dilation:  3  Effacement:  75%  Station: -2  Presentation: Vertex     Significant Labs:  Lab Results   Component Value Date    GROUPTRH A POS 2019    HEPBSAG Negative 2019       I have personallly reviewed all pertinent lab results from the last 24 hours.    Physical Exam

## 2019-09-12 NOTE — SUBJECTIVE & OBJECTIVE
Interval History:  Albina is a 24 y.o.  at 39w0d. She is doing well. Pt is comfortable after epidural    Objective:     Vital Signs (Most Recent):  Temp: 97.1 °F (36.2 °C) (19 0759)  Pulse: 96 (19 0841)  Resp: 18 (19 0759)  BP: (!) 134/57 (19 0841)  SpO2: 98 % (19 0149) Vital Signs (24h Range):  Temp:  [97.1 °F (36.2 °C)-98.3 °F (36.8 °C)] 97.1 °F (36.2 °C)  Pulse:  [] 96  Resp:  [17-18] 18  SpO2:  [98 %] 98 %  BP: (118-147)/(57-86) 134/57     Weight: 100.2 kg (221 lb)  Body mass index is 37.93 kg/m².    FHT: Cat 1 (reassuring)  TOCO: irregular    Cervical Exam:  Dilation:  5  Effacement:  90  Station: -2  Presentation: Vertex     Significant Labs:  Lab Results   Component Value Date    GROUPTRH A POS 2019    HEPBSAG Negative 2019       I have personallly reviewed all pertinent lab results from the last 24 hours.    Physical Exam

## 2019-09-12 NOTE — L&D DELIVERY NOTE
[facility]  Vaginal Delivery   Operative Note    SUMMARY     Normal spontaneous vaginal delivery of live infant, . The patient began pushing at c/c/+1.  After 10 mins of pushing, infant delivered in OA position.  Loose nuchal cord x 1 easily reduced.  Shoulders/body followed easily.  Infant placed on patient's abdomen with nursery nurse present.  Cord clamped and cut.  Placenta S/S/I.  Laceration repaired with 2- O vicryl.  Uterus firm below umbilicus.  Sponge, lap, needle counts correct.  The patient tolerated well.  Apgars 9/9, .  Infant delivered position OA over perineum.  Nuchal cord: Yes, cord reduced at perineum.    Spontaneous delivery of placenta and IV pitocin given noting good uterine tone.  first degree midline and bilateral periurethral lacerations .  Patient tolerated delivery well. Sponge needle and lap counted correctly x2.    Indications: Admitted to labor and delivery  Pregnancy complicated by:   Patient Active Problem List   Diagnosis    Obesity (BMI 30.0-34.9)    Generalized anxiety disorder    Current moderate episode of major depressive disorder without prior episode    Rheumatoid arthritis    Chronic fatigue    BMI 34.0-34.9,adult    IUGR (intrauterine growth restriction) affecting care of mother, third trimester, fetus 1    Intrauterine growth restriction (IUGR) affecting care of mother, third trimester, single gestation    IUGR,     IUGR (intrauterine growth restriction) affecting care of mother    Intrauterine growth restriction (IUGR) affecting care of mother    Intrauterine growth restriction affecting antepartum care of mother    Admitted to labor and delivery     Admitting GA: 39w0d    Delivery Information for  Desean Beckham    Birth information:  YOB: 2018   Time of birth: 1:28 PM   Sex: male   Head Delivery Date/Time:     Delivery type:    Gestational Age: 39w0d    Delivery Providers    Delivering clinician:              Measurements    Weight:    Length:           Apgars    Living status:  Living  Apgars:   1 min.:   5 min.:   10 min.:   15 min.:   20 min.:     Skin color:   1  1       Heart rate:    2       Reflex irritability:   2  2       Muscle tone:   2  2       Respiratory effort:   2  2       Total:    9                              Interventions/Resuscitation         Cord    No data filed        Placenta    Placenta delivery date/time:    Placenta removal:             Labor Events:       labor: No     Labor Onset Date/Time:         Dilation Complete Date/Time: 2019 13:11     Start Pushing Date/Time: 2019 13:11       Start Pushing Date/Time: 2019 13:11     Rupture Date/Time: 19  0853         Rupture type: intact         Fluid Amount: Small      Fluid Color: Clear      Fluid Odor:        Membrane Status: ARM (Artificial Rupture)               steroids: None     Antibiotics given for GBS: No     Induction:       Indications for induction:        Augmentation:       Indications for augmentation:       Labor complications: None     Additional complications:          Cervical ripening:                     Delivery:      Episiotomy:       Indication for Episiotomy:       Perineal Lacerations:   Repaired:      Periurethral Laceration:   Repaired:     Labial Laceration:   Repaired:     Sulcus Laceration:   Repaired:     Vaginal Laceration:   Repaired:     Cervical Laceration:   Repaired:     Repair suture:       Repair # of packets:       Last Value - EBL - Nursing (mL):       Sum - EBL - Nursing (mL): 0     Last Value - EBL - Anesthesia (mL):      Calculated QBL (mL):        Vaginal Sweep Performed:       Surgicount Correct:         Other providers:            Details (if applicable):  Trial of Labor      Categorization:      Priority:     Indications for :     Incision Type:       Additional  information:  Forceps:    Vacuum:    Breech:    Observed  anomalies    Other (Comments):

## 2019-09-13 LAB
BASOPHILS # BLD AUTO: 0.04 K/UL (ref 0–0.2)
BASOPHILS NFR BLD: 0.4 % (ref 0–1.9)
DIFFERENTIAL METHOD: ABNORMAL
EOSINOPHIL # BLD AUTO: 0 K/UL (ref 0–0.5)
EOSINOPHIL NFR BLD: 0.4 % (ref 0–8)
ERYTHROCYTE [DISTWIDTH] IN BLOOD BY AUTOMATED COUNT: 14.6 % (ref 11.5–14.5)
HCT VFR BLD AUTO: 34.4 % (ref 37–48.5)
HGB BLD-MCNC: 11.5 G/DL (ref 12–16)
IMM GRANULOCYTES # BLD AUTO: 0.05 K/UL (ref 0–0.04)
IMM GRANULOCYTES NFR BLD AUTO: 0.5 % (ref 0–0.5)
LYMPHOCYTES # BLD AUTO: 3.4 K/UL (ref 1–4.8)
LYMPHOCYTES NFR BLD: 35.5 % (ref 18–48)
MCH RBC QN AUTO: 30.7 PG (ref 27–31)
MCHC RBC AUTO-ENTMCNC: 33.4 G/DL (ref 32–36)
MCV RBC AUTO: 92 FL (ref 82–98)
MONOCYTES # BLD AUTO: 0.9 K/UL (ref 0.3–1)
MONOCYTES NFR BLD: 9.1 % (ref 4–15)
NEUTROPHILS # BLD AUTO: 5.2 K/UL (ref 1.8–7.7)
NEUTROPHILS NFR BLD: 54.1 % (ref 38–73)
NRBC BLD-RTO: 0 /100 WBC
PLATELET # BLD AUTO: 264 K/UL (ref 150–350)
PMV BLD AUTO: 10.6 FL (ref 9.2–12.9)
RBC # BLD AUTO: 3.75 M/UL (ref 4–5.4)
WBC # BLD AUTO: 9.64 K/UL (ref 3.9–12.7)

## 2019-09-13 PROCEDURE — 36415 COLL VENOUS BLD VENIPUNCTURE: CPT

## 2019-09-13 PROCEDURE — 85025 COMPLETE CBC W/AUTO DIFF WBC: CPT

## 2019-09-13 PROCEDURE — 12000002 HC ACUTE/MED SURGE SEMI-PRIVATE ROOM

## 2019-09-13 PROCEDURE — 25000003 PHARM REV CODE 250: Performed by: OBSTETRICS & GYNECOLOGY

## 2019-09-13 RX ADMIN — IBUPROFEN 600 MG: 400 TABLET, FILM COATED ORAL at 06:09

## 2019-09-13 RX ADMIN — IBUPROFEN 600 MG: 400 TABLET, FILM COATED ORAL at 04:09

## 2019-09-13 RX ADMIN — DOCUSATE SODIUM 200 MG: 100 CAPSULE, LIQUID FILLED ORAL at 09:09

## 2019-09-13 RX ADMIN — OXYCODONE AND ACETAMINOPHEN 1 TABLET: 5; 325 TABLET ORAL at 02:09

## 2019-09-13 RX ADMIN — OXYCODONE HYDROCHLORIDE AND ACETAMINOPHEN 1 TABLET: 10; 325 TABLET ORAL at 11:09

## 2019-09-13 RX ADMIN — IBUPROFEN 600 MG: 400 TABLET, FILM COATED ORAL at 10:09

## 2019-09-13 RX ADMIN — OXYCODONE AND ACETAMINOPHEN 1 TABLET: 5; 325 TABLET ORAL at 10:09

## 2019-09-13 RX ADMIN — OXYCODONE HYDROCHLORIDE AND ACETAMINOPHEN 1 TABLET: 10; 325 TABLET ORAL at 04:09

## 2019-09-13 NOTE — PLAN OF CARE
Problem: Adult Inpatient Plan of Care  Goal: Plan of Care Review  Outcome: Ongoing (interventions implemented as appropriate)  Pt with adequate pain control with po pain medication. Lochia minimal. Voiding without difficulty. VSS. NAD noted.

## 2019-09-13 NOTE — ANESTHESIA POSTPROCEDURE EVALUATION
Anesthesia Post Evaluation    Patient: Albina Beckham    Procedure(s) Performed: * No procedures listed *    Final Anesthesia Type: epidural  Patient location during evaluation: med/surg floor  Patient participation: Yes- Able to Participate  Level of consciousness: awake and alert and oriented  Post-procedure vital signs: reviewed and stable  Pain management: adequate  Airway patency: patent  PONV status at discharge: No PONV  Anesthetic complications: no      Cardiovascular status: blood pressure returned to baseline and hemodynamically stable  Respiratory status: unassisted, spontaneous ventilation and room air  Hydration status: euvolemic  Follow-up not needed.          Vitals Value Taken Time   /77 9/12/2019  7:00 PM   Temp 36.8 °C (98.3 °F) 9/12/2019  7:00 PM   Pulse 93 9/12/2019  7:00 PM   Resp 20 9/12/2019  7:00 PM   SpO2 99 % 9/12/2019  7:00 PM         No case tracking events are documented in the log.      Pain/Darryl Score: Pain Rating Prior to Med Admin: 2 (9/12/2019  8:52 PM)  Pain Rating Post Med Admin: 0 (9/12/2019  4:30 AM)  Darryl Score: 10 (9/12/2019  3:15 PM)    Patient is status post labor epidural from today.  She is ambulating without difficulty. No residual neurologic sequela from regional block.  No complaints of back pain or headache with ambulation.  Epidural site without signs of infection.  Anesthesia signing off.

## 2019-09-13 NOTE — SUBJECTIVE & OBJECTIVE
Hospital course: No notes on file    Interval History: s/p  PPD #1    She is doing well this morning. She is tolerating a regular diet without nausea or vomiting. She is voiding spontaneously. She is ambulating. She has passed flatus, and has not a BM. Vaginal bleeding is mild. She denies fever or chills. Abdominal pain is mild and controlled with oral medications. She is not breastfeeding. She desires circumcision for her male baby: yes.    Objective:     Vital Signs (Most Recent):  Temp: 97.9 °F (36.6 °C) (19)  Pulse: 84 (19)  Resp: 18 (19)  BP: 120/79 (19)  SpO2: 98 % (19) Vital Signs (24h Range):  Temp:  [97.9 °F (36.6 °C)-98.4 °F (36.9 °C)] 97.9 °F (36.6 °C)  Pulse:  [] 84  Resp:  [16-20] 18  SpO2:  [97 %-99 %] 98 %  BP: (107-177)/(56-92) 120/79     Weight: 100.2 kg (221 lb)  Body mass index is 37.93 kg/m².      Intake/Output Summary (Last 24 hours) at 2019 0841  Last data filed at 2019 0740  Gross per 24 hour   Intake 3000 ml   Output 2706 ml   Net 294 ml       Significant Labs:  Lab Results   Component Value Date    GROUPTRH A POS 2019    HEPBSAG Negative 2019     Recent Labs   Lab 19  0429   HGB 11.5*   HCT 34.4*       I have personallly reviewed all pertinent lab results from the last 24 hours.    Physical Exam:   Constitutional: She is oriented to person, place, and time. She appears well-developed and well-nourished.    HENT:   Head: Normocephalic and atraumatic.       Pulmonary/Chest: Effort normal.        Abdominal: Soft.     Genitourinary: Uterus normal.   Genitourinary Comments: Uterus firm below the umbilicus           Musculoskeletal: Moves all extremeties. She exhibits no edema or tenderness.       Neurological: She is alert and oriented to person, place, and time.    Skin: Skin is warm and dry.    Psychiatric: She has a normal mood and affect. Her behavior is normal.

## 2019-09-14 VITALS
OXYGEN SATURATION: 99 % | SYSTOLIC BLOOD PRESSURE: 118 MMHG | BODY MASS INDEX: 37.73 KG/M2 | RESPIRATION RATE: 20 BRPM | TEMPERATURE: 99 F | WEIGHT: 221 LBS | HEIGHT: 64 IN | HEART RATE: 76 BPM | DIASTOLIC BLOOD PRESSURE: 81 MMHG

## 2019-09-14 LAB
ANION GAP SERPL CALC-SCNC: 8 MMOL/L (ref 8–16)
BUN SERPL-MCNC: 7 MG/DL (ref 6–20)
CALCIUM SERPL-MCNC: 8.4 MG/DL (ref 8.7–10.5)
CHLORIDE SERPL-SCNC: 105 MMOL/L (ref 95–110)
CO2 SERPL-SCNC: 24 MMOL/L (ref 23–29)
CREAT SERPL-MCNC: 0.5 MG/DL (ref 0.5–1.4)
EST. GFR  (AFRICAN AMERICAN): >60 ML/MIN/1.73 M^2
EST. GFR  (NON AFRICAN AMERICAN): >60 ML/MIN/1.73 M^2
GLUCOSE SERPL-MCNC: 80 MG/DL (ref 70–110)
POTASSIUM SERPL-SCNC: 3.9 MMOL/L (ref 3.5–5.1)
SODIUM SERPL-SCNC: 137 MMOL/L (ref 136–145)
TROPONIN I SERPL DL<=0.01 NG/ML-MCNC: <0.03 NG/ML (ref 0.02–0.04)

## 2019-09-14 PROCEDURE — 93005 ELECTROCARDIOGRAM TRACING: CPT

## 2019-09-14 PROCEDURE — 84484 ASSAY OF TROPONIN QUANT: CPT

## 2019-09-14 PROCEDURE — 25500020 PHARM REV CODE 255: Performed by: OBSTETRICS & GYNECOLOGY

## 2019-09-14 PROCEDURE — 25000003 PHARM REV CODE 250: Performed by: OBSTETRICS & GYNECOLOGY

## 2019-09-14 PROCEDURE — 80048 BASIC METABOLIC PNL TOTAL CA: CPT

## 2019-09-14 PROCEDURE — 36415 COLL VENOUS BLD VENIPUNCTURE: CPT

## 2019-09-14 RX ORDER — IBUPROFEN 600 MG/1
600 TABLET ORAL EVERY 6 HOURS PRN
Refills: 0 | COMMUNITY
Start: 2019-09-14 | End: 2021-08-11

## 2019-09-14 RX ORDER — OXYCODONE AND ACETAMINOPHEN 5; 325 MG/1; MG/1
1-2 TABLET ORAL EVERY 4 HOURS PRN
Qty: 30 TABLET | Refills: 0 | Status: SHIPPED | OUTPATIENT
Start: 2019-09-14 | End: 2021-08-11

## 2019-09-14 RX ORDER — DOCUSATE SODIUM 100 MG/1
200 CAPSULE, LIQUID FILLED ORAL 2 TIMES DAILY PRN
Refills: 0 | COMMUNITY
Start: 2019-09-14 | End: 2022-05-04

## 2019-09-14 RX ADMIN — IOHEXOL 100 ML: 350 INJECTION, SOLUTION INTRAVENOUS at 04:09

## 2019-09-14 RX ADMIN — DOCUSATE SODIUM 200 MG: 100 CAPSULE, LIQUID FILLED ORAL at 08:09

## 2019-09-14 RX ADMIN — OXYCODONE AND ACETAMINOPHEN 1 TABLET: 5; 325 TABLET ORAL at 06:09

## 2019-09-14 RX ADMIN — IBUPROFEN 600 MG: 400 TABLET, FILM COATED ORAL at 06:09

## 2019-09-14 RX ADMIN — OXYCODONE AND ACETAMINOPHEN 1 TABLET: 5; 325 TABLET ORAL at 07:09

## 2019-09-14 RX ADMIN — IBUPROFEN 600 MG: 400 TABLET, FILM COATED ORAL at 07:09

## 2019-09-14 NOTE — DISCHARGE SUMMARY
[facility]  Discharge Summary  Obstetrics - Triage      Admit Date: 2019    Discharge Date and Time:  2019 11:52 AM    Attending Physician: Coral Nicholas MD     Discharge Provider: Coral Nicholas    Reason for Admission: IUP at 39 week for IOL    Hospital Course (synopsis of major diagnoses, care, treatment, and services provided during the course of the hospital stay):     Albina Beckham is a 24 y.o.  female who presented to labor and delivery for induction of labor at 39 weeks.  She was induced with Cervidil and Pitocin and she had the spontaneous vaginal delivery without complication.  Her postpartum course was unremarkable.  Throughout her stay her vital signs were stable and she was afebrile.  Upon discharge she has no complaints and her bleeding is minimal.  She will be given discharge instructions and a prescription for pain medication and she will follow up with me in 6 weeks.    Gen - NAD  Uterus - firm below umbilicus  Ext - no edema, no calf tenderness.    She was admitted to the labor and delivery triage area. Vital signs on admit were: Temp: 98.3 °F (36.8 °C), Pulse: 89, Resp: 18, BP: 122/77, SpO2: 98 %.      Final Diagnoses:    Principal Problem: Admitted to labor and delivery   Secondary Diagnoses: none    Discharged Condition: good    Disposition: Home or Self Care    Follow Up/Patient Instructions:     Medications:  Reconciled Home Medications:      Medication List      START taking these medications    docusate sodium 100 MG capsule  Commonly known as:  COLACE  Take 2 capsules (200 mg total) by mouth 2 (two) times daily as needed for Constipation.     ibuprofen 600 MG tablet  Commonly known as:  ADVIL,MOTRIN  Take 1 tablet (600 mg total) by mouth every 6 (six) hours as needed (cramping).     lanolin Crea cream  Apply topically as needed for Dry Skin (to nipples).     oxyCODONE-acetaminophen 5-325 mg per tablet  Commonly known as:  PERCOCET  Take 1-2  tablets by mouth every 4 (four) hours as needed.        CONTINUE taking these medications    EPINEPHrine 0.3 mg/0.3 mL Atin  Commonly known as:  EPIPEN 2-KIMANI  Inject Epipen as directed.     esomeprazole 40 MG capsule  Commonly known as:  NEXIUM  Take 40 mg by mouth before breakfast.     PRENATAL-1 ORAL  Take 1 tablet by mouth once daily.        STOP taking these medications    valACYclovir 1000 MG tablet  Commonly known as:  VALTREX          Discharge Procedure Orders   Diet Adult Regular     Lifting restrictions   Scheduling Instructions: No lifting greater than 10 # for 6 weeks     Other restrictions (specify):   Order Comments: Pelvic rest x 6 weeks     No driving until:   Order Comments: No driving for 2 weeks     Notify your health care provider if you experience any of the following:  temperature >100.4     Notify your health care provider if you experience any of the following:  persistent nausea and vomiting or diarrhea     Notify your health care provider if you experience any of the following:  severe uncontrolled pain     Notify your health care provider if you experience any of the following:  redness, tenderness, or signs of infection (pain, swelling, redness, odor or green/yellow discharge around incision site)     Notify your health care provider if you experience any of the following:  difficulty breathing or increased cough     Notify your health care provider if you experience any of the following:  severe persistent headache     Notify your health care provider if you experience any of the following:  worsening rash     Notify your health care provider if you experience any of the following:  persistent dizziness, light-headedness, or visual disturbances     Notify your health care provider if you experience any of the following:  increased confusion or weakness     Notify your health care provider if you experience any of the following:   Order Comments: Call provider for any heavy vaginal  bleeding     Shower on day dressing removed (No bath)     Follow-up Information     Coral Nicholas MD In 6 weeks.    Specialty:  Obstetrics  Why:  POSTPARTAL FOLLOW UP  Contact information:  [address] OBSTETRICS & GYNECOLOGY  [facility] [address]  [phone]             Coral Nicholas MD. Schedule an appointment as soon as possible for a visit in 6 weeks.    Specialty:  Obstetrics  Why:  For follow up  Contact information:  [address] OBSTETRICS & GYNECOLOGY  [facility] [address]  [phone]

## 2019-09-14 NOTE — CONSULTS
[facility] Medicine  Consult Note    Patient Name: Albina Beckham  MRN: [id number]  Admission Date: 9/11/2019  Hospital Length of Stay: 3 days  Attending Physician: Coral Nicholas MD   Primary Care Provider: Primary Doctor No       Patient information was obtained from patient, parent and ER records.     Consults the patient was seen and examined approximately 3:20 p.m. on 09/14/2019  Subjective:     Principal Problem: Admitted to labor and delivery    Reason for consultation:  Chest pain    HPI: 24 y.o. female presented for induction of labor at 39 weeks and subsequently had spontaneous vaginal delivery without complication.  The patient was doing well and planned for discharge home today when she had the sudden onset of chest pain. The pain was located across anterior chest wall.  She describes the pain as a tightness quality.  The pain was moderate in intensity and constant in timing.  After about 2 hr the pain subsided to mild intensity and continues at this time.  The pain does not radiate.  She reports some associated shortness of breath which is persistent, worse with exertion, mild intensity.  She reports some associated numbness and tingling of left upper extremity at tips of fingers. She denies fever, chills, cough, headache, abdominal pain, nausea, vomiting, lightheadedness, dizziness or syncope.  She reports a history of episodic palpitations for which she was to follow up with Cardiology after delivery.  In addition the patient reports a history of carpal tunnel syndrome of left hand that developed during her pregnancy.    Past Medical History:   Diagnosis Date    Heart palpitations 2013    Juvenile arthritis     Juvenile rheumatoid arthritis        Past Surgical History:   Procedure Laterality Date    KNEE SURGERY Left 2013    reconstructive    rheuamtoid nodole removed Left 2004    Ring finger       Review of patient's allergies indicates:   Allergen Reactions     Ciprofloxacin Anaphylaxis    Tamiflu [oseltamivir]        No current facility-administered medications on file prior to encounter.      Current Outpatient Medications on File Prior to Encounter   Medication Sig    esomeprazole (NEXIUM) 40 MG capsule Take 40 mg by mouth before breakfast.    prenatal 25/iron fum/folic/dha (PRENATAL-1 ORAL) Take 1 tablet by mouth once daily.    [DISCONTINUED] valACYclovir (VALTREX) 1000 MG tablet Take 1,000 mg by mouth once daily.    epinephrine (EPIPEN 2-KIMANI) 0.3 mg/0.3 mL AtIn Inject Epipen as directed.     Family History     Problem Relation (Age of Onset)    Arthritis Father, Mother    Diabetes Maternal Aunt, Maternal Uncle, Maternal Grandfather    Hypertension Father    Lupus Maternal Aunt        Tobacco Use    Smoking status: Never Smoker    Smokeless tobacco: Never Used   Substance and Sexual Activity    Alcohol use: Not Currently    Drug use: No    Sexual activity: Yes     Partners: Male     Review of Systems no dysuria.  No rash.  No speech changes, visual changes, or focal weakness.  No tongue biting or incontinence.  Otherwise 10 point review of systems negative except as per HPI    Objective:     Vital Signs (Most Recent):  Temp: 98.4 °F (36.9 °C) (09/14/19 1411)  Pulse: 84 (09/14/19 1411)  Resp: 18 (09/14/19 1411)  BP: 127/84 (09/14/19 1411)  SpO2: 99 % (09/14/19 1411) Vital Signs (24h Range):  Temp:  [97.9 °F (36.6 °C)-98.4 °F (36.9 °C)] 98.4 °F (36.9 °C)  Pulse:  [80-89] 84  Resp:  [18-20] 18  SpO2:  [96 %-99 %] 99 %  BP: (118-132)/(68-89) 127/84     Weight: 100.2 kg (221 lb)  Body mass index is 37.93 kg/m².    Physical Exam  General:  Comfortable appearing, nontoxic, no apparent distress  Head and eyes:  Anicteric sclerae, no conjunctival discharge, PERRLA, EOMI  ENT:  Moist mucous membranes, no oral thrush  Cardiovascular:  2+ radial pulses, regular rate and rhythm, no murmurs, extremities are warm and well perfused with normal capillary refill  GI:  Abdomen  soft and nontender  Pulmonary:  Comfortable work of breathing, lungs clear to auscultation bilaterally  Skin:  Dry and warm no jaundice  Psych:  Mood okay, affect normal, insight good  Neuro:  Nonfocal motor exam, symmetrical facial expression, alert and oriented, fluent speech    Significant Labs:   BMP: No results for input(s): GLU, NA, K, CL, CO2, BUN, CREATININE, CALCIUM, MG in the last 48 hours.  CBC:   Recent Labs   Lab 19  0429   WBC 9.64   HGB 11.5*   HCT 34.4*        CMP: No results for input(s): NA, K, CL, CO2, GLU, BUN, CREATININE, CALCIUM, PROT, ALBUMIN, BILITOT, ALKPHOS, AST, ALT, ANIONGAP, EGFRNONAA in the last 48 hours.    Invalid input(s): ESTGFAFRICA  All pertinent labs within the past 24 hours have been reviewed.    Significant Imaging: I have reviewed all pertinent imaging results/findings within the past 24 hours.  EKG personally reviewed:  Normal sinus rhythm, poor R-wave progression, low voltage, no ST elevation    Assessment/Plan:     24-year-old female with history of palpitations, JRA, carpal tunnel who is status post spontaneous vaginal delivery who developed sudden onset chest pain with shortness of breath.     Assessment:  Atypical chest pain  Status post spontaneous vaginal delivery  Mild anemia due to pregnancy/delivery  H/O Palpitations  Left carpal tunnel syndrome  Juvenile rheumatoid arthritis    Plan:  Check troponin.  EKG unremarkable.   CTA chest rule out pulmonary embolus  Possible episode of anxiety with concomitant carpal tunnel syndrome  Possible episodic palpitation that resolved prior to EKG  Possible deconditioning s/p  causing exertional SOB  If troponin and CTA unremarkable the patient will be stable for discharge home today  She will need to follow up with Cardiology in the next 2 weeks for further workup as previously planned  I discussed the case with attending physician Dr. Nicholas  Thank you for your consult.  Please call with questions.     ADDENDUM  5:52 PM 9/9/2019 - I have reviewed the workup including CTA chest, EKG, and troponin. All are unremarkable. The patient is stable for DC home today with outpatient cardiology FU.     Bill George MD [phone]  Department of Hospital Medicine   Counts include 234 beds at the [facility]

## 2019-09-14 NOTE — PLAN OF CARE
Problem: Adjustment to Role Transition (Postpartum Vaginal Delivery)  Goal: Successful Maternal Role Transition  Outcome: Ongoing (interventions implemented as appropriate)  Intervention: Support Maternal Role Transition     09/13/19 2243   Promote Positive Parenting Behavior   Parent/Child Attachment Promotion caring behavior modeled;face-to-face positioning promoted   Promote Anxiety Reduction   Supportive Measures active listening utilized         Problem: Infection (Postpartum Vaginal Delivery)  Goal: Absence of Infection Signs/Symptoms  Outcome: Ongoing (interventions implemented as appropriate)  Intervention: Prevent or Manage Infection     09/13/19 2243   Prevent or Manage Infection   Infection Management aseptic technique maintained         Problem: Pain (Postpartum Vaginal Delivery)  Goal: Acceptable Pain Control  Outcome: Ongoing (interventions implemented as appropriate)  Intervention: Prevent or Manage Pain     09/13/19 2243   Prevent or Manage Pain   Pain Management Interventions quiet environment facilitated         Problem: Urinary Retention (Postpartum Vaginal Delivery)  Goal: Effective Urinary Elimination  Outcome: Ongoing (interventions implemented as appropriate)  Intervention: Promote Effective Urinary Elimination     09/13/19 2243   Promote Bladder Elimination   Urinary Elimination Promotion frequent voiding encouraged

## 2019-09-14 NOTE — NURSING
"New orders from Dr Nicholas to consult hospitalist, Dr George notified. New orders received from him and put in. Pt still experiencing "pins and needles" in right wrist and arm. RR even and unlabored, NAD noted.  "

## 2019-09-14 NOTE — SUBJECTIVE & OBJECTIVE
Past Medical History:   Diagnosis Date    Heart palpitations 2013    Juvenile arthritis     Juvenile rheumatoid arthritis        Past Surgical History:   Procedure Laterality Date    KNEE SURGERY Left 2013    reconstructive    rheuamtoid nodole removed Left 2004    Ring finger       Review of patient's allergies indicates:   Allergen Reactions    Ciprofloxacin Anaphylaxis    Tamiflu [oseltamivir]        No current facility-administered medications on file prior to encounter.      Current Outpatient Medications on File Prior to Encounter   Medication Sig    esomeprazole (NEXIUM) 40 MG capsule Take 40 mg by mouth before breakfast.    prenatal 25/iron fum/folic/dha (PRENATAL-1 ORAL) Take 1 tablet by mouth once daily.    [DISCONTINUED] valACYclovir (VALTREX) 1000 MG tablet Take 1,000 mg by mouth once daily.    epinephrine (EPIPEN 2-KIMANI) 0.3 mg/0.3 mL AtIn Inject Epipen as directed.     Family History     Problem Relation (Age of Onset)    Arthritis Father, Mother    Diabetes Maternal Aunt, Maternal Uncle, Maternal Grandfather    Hypertension Father    Lupus Maternal Aunt        Tobacco Use    Smoking status: Never Smoker    Smokeless tobacco: Never Used   Substance and Sexual Activity    Alcohol use: Not Currently    Drug use: No    Sexual activity: Yes     Partners: Male     Review of Systems no dysuria.  No rash.  No speech changes, visual changes, or focal weakness.  No tongue biting or incontinence.  Otherwise 10 point review of systems negative except as per HPI    Objective:     Vital Signs (Most Recent):  Temp: 98.4 °F (36.9 °C) (09/14/19 1411)  Pulse: 84 (09/14/19 1411)  Resp: 18 (09/14/19 1411)  BP: 127/84 (09/14/19 1411)  SpO2: 99 % (09/14/19 1411) Vital Signs (24h Range):  Temp:  [97.9 °F (36.6 °C)-98.4 °F (36.9 °C)] 98.4 °F (36.9 °C)  Pulse:  [80-89] 84  Resp:  [18-20] 18  SpO2:  [96 %-99 %] 99 %  BP: (118-132)/(68-89) 127/84     Weight: 100.2 kg (221 lb)  Body mass index is 37.93  kg/m².    Physical Exam  General:  Comfortable appearing, nontoxic, no apparent distress  Head and eyes:  Anicteric sclerae, no conjunctival discharge, PERRLA, EOMI  ENT:  Moist mucous membranes, no oral thrush  Cardiovascular:  2+ radial pulses, regular rate and rhythm, no murmurs, extremities are warm and well perfused with normal capillary refill  GI:  Abdomen soft and nontender  Pulmonary:  Comfortable work of breathing, lungs clear to auscultation bilaterally  Skin:  Dry and warm no jaundice  Psych:  Mood okay, affect normal, insight good  Neuro:  Nonfocal motor exam, symmetrical facial expression, alert and oriented, fluent speech    Significant Labs:   BMP: No results for input(s): GLU, NA, K, CL, CO2, BUN, CREATININE, CALCIUM, MG in the last 48 hours.  CBC:   Recent Labs   Lab 09/13/19  0429   WBC 9.64   HGB 11.5*   HCT 34.4*        CMP: No results for input(s): NA, K, CL, CO2, GLU, BUN, CREATININE, CALCIUM, PROT, ALBUMIN, BILITOT, ALKPHOS, AST, ALT, ANIONGAP, EGFRNONAA in the last 48 hours.    Invalid input(s): ESTGFAFR[place]  All pertinent labs within the past 24 hours have been reviewed.    Significant Imaging: I have reviewed all pertinent imaging results/findings within the past 24 hours.

## 2019-09-14 NOTE — HPI
24 y.o. female presented for induction of labor at 39 weeks and subsequently had spontaneous vaginal delivery without complication.  The patient was doing well and planned for discharge home today when she had the sudden onset of chest pain. The pain was located across anterior chest wall.  She describes the pain as a tightness quality.  The pain was moderate in intensity and constant in timing.  After about 2 hr the pain subsided to mild intensity and continues at this time.  The pain does not radiate.  She reports some associated shortness of breath which is persistent, worse with exertion, mild intensity.  She reports some associated numbness and tingling of left upper extremity at tips of fingers. She denies fever, chills, cough, headache, abdominal pain, nausea, vomiting, lightheadedness, dizziness or syncope.  She reports a history of episodic palpitations for which she was to follow up with Cardiology after delivery.  In addition the patient reports a history of carpal tunnel syndrome of left hand that developed during her pregnancy.

## 2019-09-14 NOTE — PLAN OF CARE
Problem: Adult Inpatient Plan of Care  Goal: Plan of Care Review  Outcome: Outcome(s) achieved Date Met: 09/14/19  Pt with adequate pain control with po pain medication. Lochia minimal. Voiding without difficulty. VSS. Denies needs at this time. NAD noted.

## 2019-09-14 NOTE — DISCHARGE INSTRUCTIONS
FOLLOW UP WITH YOUR DOCTOR IN 4-6 WEEKS OR SOONER FOR ANY PROBLEMS.    Pelvic rest for 6 weeks (no sex, tampons, douching, nothing in the vagina)   You can experience vaginal bleeding on and off for up to 6 weeks, it will gradually get lighter and the color will change from bright red to a brownish discharge towards the end.     Activity:   NO strenuous activities or exercising. Do not /lift anything over 15 pounds. Do not do heavy housework or cleaning.   NO driving for 3 days. You may take short car trips but do not drive.   You may shower and/or soak in a bathtub, both are acceptable. Use a mild soap, no heavy perfumes or fragrances to avoid irritation.   If constipation develops: You may take Colace (stool softener), Milk of Magnesia, Dulcolax or Miralax. All of these medications are sold over the counter.     Care of Episiotomy:   Local agents such as Tucks pads & Dermoplast spray. You may also use a Sitz bath: sitting in a tub of warm water for 15 minutes 2-3 times per day will help relieve the discomfort.     Pain Relief:   You may take Motrin for mild pain & uterine cramping.     Emotional Changes:   You may experience baby blues after delivery. You may feel let down, anxious and cry easily. This is normal. These feelings can begin 2-3 days after delivery and usually disappear in about a week or two. Prolonged sadness may indicate postpartum depression.     Call your doctor for any of the following:   Difficulty breathing, problems with any of your medications, inability to eat.   Foul smelling vaginal discharge.   Temperature above 100.4.   Heavy vaginal bleeding. All women bleed different after delivery and each delivery is different. Heavy bleeding consists of saturating a adore pad in a 1 hour time period. Passing clots are normal, if you pass a blood clot larger than the size of a golf ball call your doctor's office.   If you experience pain in your legs/calves, if one leg increases in size and  becomes swollen or becomes hot to touch or discolored.   Crying or periods of sadness beyond 2 weeks.     If you are breast feeding:   Wash your breasts with mild soap and warm water.   You should wear a supportive bra.   You should continue to take a prenatal vitamin for 6 weeks or until breastfeeding is discontinued.   If nipples are sore, apply a few drops of breast milk after nursing and let air dry or you can use Lanolin cream.   If breasts are engorged, apply warmth and express milk.   [facility] lactation consultant is available at [phone] for your breastfeeding needs.    If you are not breastfeeding:   Wear a tight bra and do not stimulate your breasts. Avoid handling your breasts and do not express milk. You may apply ice packs or cabbage leaves to relieve discomfort from engorgement. If your breasts become warm to touch, reddened or lumps develop call your doctor.        .21

## 2019-09-14 NOTE — NURSING
"RN requested to pts room for c/o "feeling tight in my chest and my left arm is numb on the bottom from my hand to under my shoulder." pt denies sharp chest pain or SOB, VS WNL (see flowsheet), lungs CTA bilaterally in all fields- slightly diminished in bilateral bases. Pt states "I do have some palpitations and was supposed to follow up with a cardiologist and have a MRI but I decided not to because I was pregnant." Dr Nicholas notified and new order for EKG obtained. EKG tech notified and en route to pts room. callbell in reach, pts mother at bedside, encouraged to call RN for assistance.  "

## 2019-10-08 ENCOUNTER — LAB VISIT (OUTPATIENT)
Dept: LAB | Facility: HOSPITAL | Age: 24
End: 2019-10-08
Attending: INTERNAL MEDICINE
Payer: MEDICAID

## 2019-10-08 DIAGNOSIS — R06.02 SHORTNESS OF BREATH: ICD-10-CM

## 2019-10-08 DIAGNOSIS — R00.2 PALPITATIONS: Primary | ICD-10-CM

## 2019-10-08 LAB
BASOPHILS # BLD AUTO: 0.02 K/UL (ref 0–0.2)
BASOPHILS NFR BLD: 0.5 % (ref 0–1.9)
DIFFERENTIAL METHOD: ABNORMAL
EOSINOPHIL # BLD AUTO: 0.1 K/UL (ref 0–0.5)
EOSINOPHIL NFR BLD: 1.9 % (ref 0–8)
ERYTHROCYTE [DISTWIDTH] IN BLOOD BY AUTOMATED COUNT: 13.7 % (ref 11.5–14.5)
HCT VFR BLD AUTO: 42.9 % (ref 37–48.5)
HGB BLD-MCNC: 13.6 G/DL (ref 12–16)
IMM GRANULOCYTES # BLD AUTO: 0.01 K/UL (ref 0–0.04)
IMM GRANULOCYTES NFR BLD AUTO: 0.3 % (ref 0–0.5)
LYMPHOCYTES # BLD AUTO: 2 K/UL (ref 1–4.8)
LYMPHOCYTES NFR BLD: 53 % (ref 18–48)
MCH RBC QN AUTO: 29.2 PG (ref 27–31)
MCHC RBC AUTO-ENTMCNC: 31.7 G/DL (ref 32–36)
MCV RBC AUTO: 92 FL (ref 82–98)
MONOCYTES # BLD AUTO: 0.3 K/UL (ref 0.3–1)
MONOCYTES NFR BLD: 7.8 % (ref 4–15)
NEUTROPHILS # BLD AUTO: 1.4 K/UL (ref 1.8–7.7)
NEUTROPHILS NFR BLD: 36.5 % (ref 38–73)
NRBC BLD-RTO: 0 /100 WBC
PLATELET # BLD AUTO: 355 K/UL (ref 150–350)
PMV BLD AUTO: 10.1 FL (ref 9.2–12.9)
RBC # BLD AUTO: 4.65 M/UL (ref 4–5.4)
WBC # BLD AUTO: 3.7 K/UL (ref 3.9–12.7)

## 2019-10-08 PROCEDURE — 80053 COMPREHEN METABOLIC PANEL: CPT

## 2019-10-08 PROCEDURE — 85025 COMPLETE CBC W/AUTO DIFF WBC: CPT

## 2019-10-08 PROCEDURE — 84443 ASSAY THYROID STIM HORMONE: CPT

## 2019-10-08 PROCEDURE — 36415 COLL VENOUS BLD VENIPUNCTURE: CPT | Mod: PO

## 2019-10-09 LAB
ALBUMIN SERPL BCP-MCNC: 3.4 G/DL (ref 3.5–5.2)
ALP SERPL-CCNC: 125 U/L (ref 55–135)
ALT SERPL W/O P-5'-P-CCNC: 17 U/L (ref 10–44)
ANION GAP SERPL CALC-SCNC: 9 MMOL/L (ref 8–16)
AST SERPL-CCNC: 24 U/L (ref 10–40)
BILIRUB SERPL-MCNC: 0.4 MG/DL (ref 0.1–1)
BUN SERPL-MCNC: 7 MG/DL (ref 6–20)
CALCIUM SERPL-MCNC: 8.9 MG/DL (ref 8.7–10.5)
CHLORIDE SERPL-SCNC: 103 MMOL/L (ref 95–110)
CO2 SERPL-SCNC: 23 MMOL/L (ref 23–29)
CREAT SERPL-MCNC: 0.6 MG/DL (ref 0.5–1.4)
EST. GFR  (AFRICAN AMERICAN): >60 ML/MIN/1.73 M^2
EST. GFR  (NON AFRICAN AMERICAN): >60 ML/MIN/1.73 M^2
GLUCOSE SERPL-MCNC: 79 MG/DL (ref 70–110)
POTASSIUM SERPL-SCNC: 3.8 MMOL/L (ref 3.5–5.1)
PROT SERPL-MCNC: 8 G/DL (ref 6–8.4)
SODIUM SERPL-SCNC: 135 MMOL/L (ref 136–145)
TSH SERPL DL<=0.005 MIU/L-ACNC: 0.91 UIU/ML (ref 0.4–4)

## 2019-12-02 PROBLEM — O36.5930 INTRAUTERINE GROWTH RESTRICTION (IUGR) AFFECTING CARE OF MOTHER, THIRD TRIMESTER, SINGLE GESTATION: Status: RESOLVED | Noted: 2019-08-23 | Resolved: 2019-12-02

## 2020-11-14 ENCOUNTER — HOSPITAL ENCOUNTER (EMERGENCY)
Facility: HOSPITAL | Age: 25
Discharge: HOME | End: 2020-11-14
Attending: EMERGENCY MEDICINE
Payer: MEDICAID

## 2020-11-14 VITALS
RESPIRATION RATE: 18 BRPM | TEMPERATURE: 99 F | HEIGHT: 64 IN | HEART RATE: 116 BPM | BODY MASS INDEX: 29.88 KG/M2 | OXYGEN SATURATION: 98 % | DIASTOLIC BLOOD PRESSURE: 86 MMHG | SYSTOLIC BLOOD PRESSURE: 136 MMHG | WEIGHT: 175 LBS

## 2020-11-14 DIAGNOSIS — R10.9 ABDOMINAL PAIN, UNSPECIFIED ABDOMINAL LOCATION: Primary | ICD-10-CM

## 2020-11-14 DIAGNOSIS — R09.1 PLEURISY: ICD-10-CM

## 2020-11-14 LAB
ALBUMIN SERPL BCP-MCNC: 3.7 G/DL (ref 3.5–5.2)
ALP SERPL-CCNC: 49 U/L (ref 55–135)
ALT SERPL W/O P-5'-P-CCNC: 12 U/L (ref 10–44)
ANION GAP SERPL CALC-SCNC: 10 MMOL/L (ref 8–16)
AST SERPL-CCNC: 18 U/L (ref 10–40)
B-HCG UR QL: NEGATIVE
BASOPHILS # BLD AUTO: 0.02 K/UL (ref 0–0.2)
BASOPHILS NFR BLD: 0.5 % (ref 0–1.9)
BILIRUB SERPL-MCNC: 0.7 MG/DL (ref 0.1–1)
BILIRUB UR QL STRIP: NEGATIVE
BUN SERPL-MCNC: 8 MG/DL (ref 6–20)
CALCIUM SERPL-MCNC: 8.6 MG/DL (ref 8.7–10.5)
CHLORIDE SERPL-SCNC: 103 MMOL/L (ref 95–110)
CLARITY UR: CLEAR
CO2 SERPL-SCNC: 23 MMOL/L (ref 23–29)
COLOR UR: YELLOW
CREAT SERPL-MCNC: 0.6 MG/DL (ref 0.5–1.4)
CTP QC/QA: YES
D DIMER PPP IA.FEU-MCNC: 0.3 UG/ML FEU
DIFFERENTIAL METHOD: ABNORMAL
EOSINOPHIL # BLD AUTO: 0.1 K/UL (ref 0–0.5)
EOSINOPHIL NFR BLD: 1.2 % (ref 0–8)
ERYTHROCYTE [DISTWIDTH] IN BLOOD BY AUTOMATED COUNT: 12.1 % (ref 11.5–14.5)
EST. GFR  (AFRICAN AMERICAN): >60 ML/MIN/1.73 M^2
EST. GFR  (NON AFRICAN AMERICAN): >60 ML/MIN/1.73 M^2
GLUCOSE SERPL-MCNC: 97 MG/DL (ref 70–110)
GLUCOSE UR QL STRIP: NEGATIVE
HCT VFR BLD AUTO: 40.6 % (ref 37–48.5)
HGB BLD-MCNC: 13.8 G/DL (ref 12–16)
HGB UR QL STRIP: NEGATIVE
IMM GRANULOCYTES # BLD AUTO: 0 K/UL (ref 0–0.04)
IMM GRANULOCYTES NFR BLD AUTO: 0 % (ref 0–0.5)
KETONES UR QL STRIP: NEGATIVE
LEUKOCYTE ESTERASE UR QL STRIP: NEGATIVE
LYMPHOCYTES # BLD AUTO: 2.3 K/UL (ref 1–4.8)
LYMPHOCYTES NFR BLD: 55 % (ref 18–48)
MCH RBC QN AUTO: 29.8 PG (ref 27–31)
MCHC RBC AUTO-ENTMCNC: 34 G/DL (ref 32–36)
MCV RBC AUTO: 88 FL (ref 82–98)
MONOCYTES # BLD AUTO: 0.4 K/UL (ref 0.3–1)
MONOCYTES NFR BLD: 9.5 % (ref 4–15)
NEUTROPHILS # BLD AUTO: 1.4 K/UL (ref 1.8–7.7)
NEUTROPHILS NFR BLD: 33.8 % (ref 38–73)
NITRITE UR QL STRIP: NEGATIVE
NRBC BLD-RTO: 0 /100 WBC
PH UR STRIP: 6 [PH] (ref 5–8)
PLATELET # BLD AUTO: 355 K/UL (ref 150–350)
PMV BLD AUTO: 9.4 FL (ref 9.2–12.9)
POTASSIUM SERPL-SCNC: 3.5 MMOL/L (ref 3.5–5.1)
PROT SERPL-MCNC: 8.6 G/DL (ref 6–8.4)
PROT UR QL STRIP: ABNORMAL
RBC # BLD AUTO: 4.63 M/UL (ref 4–5.4)
SODIUM SERPL-SCNC: 136 MMOL/L (ref 136–145)
SP GR UR STRIP: 1.02 (ref 1–1.03)
URN SPEC COLLECT METH UR: ABNORMAL
UROBILINOGEN UR STRIP-ACNC: NEGATIVE EU/DL
WBC # BLD AUTO: 4.09 K/UL (ref 3.9–12.7)

## 2020-11-14 PROCEDURE — 85025 COMPLETE CBC W/AUTO DIFF WBC: CPT

## 2020-11-14 PROCEDURE — 99285 EMERGENCY DEPT VISIT HI MDM: CPT | Mod: 25

## 2020-11-14 PROCEDURE — 81003 URINALYSIS AUTO W/O SCOPE: CPT

## 2020-11-14 PROCEDURE — 81025 URINE PREGNANCY TEST: CPT | Performed by: EMERGENCY MEDICINE

## 2020-11-14 PROCEDURE — 63600175 PHARM REV CODE 636 W HCPCS: Performed by: EMERGENCY MEDICINE

## 2020-11-14 PROCEDURE — 93010 ELECTROCARDIOGRAM REPORT: CPT | Mod: ,,, | Performed by: INTERNAL MEDICINE

## 2020-11-14 PROCEDURE — 80053 COMPREHEN METABOLIC PANEL: CPT

## 2020-11-14 PROCEDURE — 93010 EKG 12-LEAD: ICD-10-PCS | Mod: ,,, | Performed by: INTERNAL MEDICINE

## 2020-11-14 PROCEDURE — 93005 ELECTROCARDIOGRAM TRACING: CPT | Performed by: INTERNAL MEDICINE

## 2020-11-14 PROCEDURE — 96360 HYDRATION IV INFUSION INIT: CPT

## 2020-11-14 PROCEDURE — 85379 FIBRIN DEGRADATION QUANT: CPT

## 2020-11-14 RX ORDER — MELOXICAM 7.5 MG/1
7.5 TABLET ORAL DAILY
Qty: 10 TABLET | Refills: 0 | Status: SHIPPED | OUTPATIENT
Start: 2020-11-14 | End: 2021-08-11

## 2020-11-14 RX ORDER — NORETHINDRONE ACETATE AND ETHINYL ESTRADIOL .02; 1 MG/1; MG/1
1 TABLET ORAL DAILY
COMMUNITY
Start: 2020-11-05 | End: 2021-08-11

## 2020-11-14 RX ADMIN — SODIUM CHLORIDE, SODIUM LACTATE, POTASSIUM CHLORIDE, AND CALCIUM CHLORIDE 1000 ML: .6; .31; .03; .02 INJECTION, SOLUTION INTRAVENOUS at 11:11

## 2020-11-14 NOTE — ED PROVIDER NOTES
Encounter Date: 11/14/2020       History     Chief Complaint   Patient presents with    Abdominal Pain     left mid abdomen for 2 days     25-year-old female with history of rheumatoid arthritis.  Patient presents emergency department with complaint of left upper quadrant abdominal pain and pleuritic chest pain over last 2 days.  Patient does describe slight discoloration in urine.  She denies frequency, vaginal discharge, vaginal bleeding, no fever.  Patient's pain is worsened with deep inspiration as well to the left upper quadrant region.  She denies cough, shortness of breath, no URI symptoms, no recent illness.        Review of patient's allergies indicates:   Allergen Reactions    Ciprofloxacin Anaphylaxis    Tamiflu [oseltamivir]      Past Medical History:   Diagnosis Date    Heart palpitations 2013    Juvenile arthritis     Juvenile rheumatoid arthritis      Past Surgical History:   Procedure Laterality Date    KNEE SURGERY Left 2013    reconstructive    rheuamtoid nodole removed Left 2004    Ring finger     Family History   Problem Relation Age of Onset    Arthritis Father     Hypertension Father     Arthritis Mother     Diabetes Maternal Aunt     Diabetes Maternal Uncle     Diabetes Maternal Grandfather     Lupus Maternal Aunt     Melanoma Neg Hx     Psoriasis Neg Hx     Eczema Neg Hx      Social History     Tobacco Use    Smoking status: Never Smoker    Smokeless tobacco: Never Used   Substance Use Topics    Alcohol use: Not Currently    Drug use: No     Review of Systems   Constitutional: Negative for fever.   HENT: Negative for sore throat.    Respiratory: Negative for shortness of breath.    Cardiovascular: Negative for chest pain.   Gastrointestinal: Negative for nausea and vomiting.   Genitourinary: Positive for flank pain. Negative for dysuria.   Musculoskeletal: Negative for back pain and myalgias.   Skin: Negative for rash.   Neurological: Negative for weakness,  light-headedness, numbness and headaches.   Hematological: Does not bruise/bleed easily.   Psychiatric/Behavioral: The patient is not nervous/anxious.        Physical Exam     Initial Vitals [11/14/20 1100]   BP Pulse Resp Temp SpO2   136/86 (!) 116 18 98.7 °F (37.1 °C) 98 %      MAP       --         Physical Exam    Nursing note and vitals reviewed.  Constitutional: She appears well-developed and well-nourished.   HENT:   Head: Normocephalic and atraumatic.   Nose: Nose normal.   Mouth/Throat: Oropharynx is clear and moist.   Eyes: Conjunctivae and EOM are normal. Pupils are equal, round, and reactive to light.   Neck: Normal range of motion. Neck supple. No thyromegaly present. No tracheal deviation present.   Cardiovascular: Normal rate, regular rhythm, normal heart sounds and intact distal pulses. Exam reveals no gallop and no friction rub.    No murmur heard.  Pulmonary/Chest: No stridor. No respiratory distress.   Course bilateral breath sounds with no adventitious   Abdominal: Soft. Bowel sounds are normal. She exhibits no mass. There is abdominal tenderness (Mild tenderness noted to the left upper quadrant region). There is no rebound and no guarding.       Musculoskeletal: Normal range of motion. No edema.   Lymphadenopathy:     She has no cervical adenopathy.   Neurological: She is alert and oriented to person, place, and time. She has normal strength and normal reflexes. GCS score is 15. GCS eye subscore is 4. GCS verbal subscore is 5. GCS motor subscore is 6.   Skin: Skin is warm and dry. Capillary refill takes less than 2 seconds.   Psychiatric: She has a normal mood and affect.         ED Course   Procedures  Labs Reviewed   CBC W/ AUTO DIFFERENTIAL   COMPREHENSIVE METABOLIC PANEL   URINALYSIS, REFLEX TO URINE CULTURE   D DIMER, QUANTITATIVE   POCT URINE PREGNANCY          Imaging Results          X-Ray Chest AP Portable (In process)                  Medical Decision Making:   Initial Assessment:    25-year-old female please no significant past medical problems here with complaint of left flank, pleuritic pain for approximately 2 days  Differential Diagnosis:   Pleurisy, ureterolithiasis, pyelonephritis, musculoskeletal pain, pulmonary embolus  Clinical Tests:   Lab Tests: Ordered and Reviewed  Radiological Study: Ordered and Reviewed  Medical Tests: Ordered and Reviewed  ED Management:  Emergency department.  Patient found with normal D-dimer did experience left pleuritic pain versus left flank pain.  Patient on CT abdomen pelvis did not show evidence of acute nephrolithiasis or ureterolithiasis.  Patient had normal workup including urinalysis, CBC, CMP.  Most likely pain is caused due to pleurisy and musculoskeletal pain.  Patient placed on Mobic 7.5 mg daily can take for the next 5-10 days.  patient instruction to drink plenty of fluids rest as much as possible.  She is to return if fever, worsening pain or additional concerns.                             Clinical Impression:       ICD-10-CM ICD-9-CM   1. Abdominal pain, unspecified abdominal location  R10.9 789.00   2. Pleurisy  R09.1 511.0                                               Maxim Joshi MD  11/14/20 1124       Maxim Joshi MD  11/14/20 1446

## 2021-05-13 ENCOUNTER — OCCUPATIONAL HEALTH (OUTPATIENT)
Dept: URGENT CARE | Facility: CLINIC | Age: 26
End: 2021-05-13

## 2021-05-13 PROCEDURE — 80305 PR HAIR COLLECTION ONLY: ICD-10-PCS | Mod: S$GLB,,, | Performed by: EMERGENCY MEDICINE

## 2021-05-13 PROCEDURE — 80305 DRUG TEST PRSMV DIR OPT OBS: CPT | Mod: S$GLB,,, | Performed by: EMERGENCY MEDICINE

## 2021-08-11 ENCOUNTER — OFFICE VISIT (OUTPATIENT)
Dept: FAMILY MEDICINE | Facility: CLINIC | Age: 26
End: 2021-08-11
Payer: MEDICAID

## 2021-08-11 VITALS
HEIGHT: 64 IN | HEART RATE: 89 BPM | BODY MASS INDEX: 32.66 KG/M2 | RESPIRATION RATE: 17 BRPM | WEIGHT: 191.31 LBS | SYSTOLIC BLOOD PRESSURE: 116 MMHG | OXYGEN SATURATION: 98 % | DIASTOLIC BLOOD PRESSURE: 72 MMHG

## 2021-08-11 DIAGNOSIS — I73.00 RAYNAUD'S DISEASE WITHOUT GANGRENE: ICD-10-CM

## 2021-08-11 DIAGNOSIS — M17.32 POST-TRAUMATIC OSTEOARTHRITIS OF LEFT KNEE: ICD-10-CM

## 2021-08-11 DIAGNOSIS — Z00.00 PREVENTATIVE HEALTH CARE: ICD-10-CM

## 2021-08-11 DIAGNOSIS — T78.2XXD ANAPHYLACTIC REACTION, SUBSEQUENT ENCOUNTER: ICD-10-CM

## 2021-08-11 DIAGNOSIS — M06.9 RHEUMATOID ARTHRITIS, INVOLVING UNSPECIFIED SITE, UNSPECIFIED WHETHER RHEUMATOID FACTOR PRESENT: ICD-10-CM

## 2021-08-11 DIAGNOSIS — Z01.419 WELL WOMAN EXAM: Primary | ICD-10-CM

## 2021-08-11 DIAGNOSIS — E66.9 OBESITY (BMI 30.0-34.9): ICD-10-CM

## 2021-08-11 PROCEDURE — 99385 PR PREVENTIVE VISIT,NEW,18-39: ICD-10-PCS | Mod: S$PBB,,, | Performed by: FAMILY MEDICINE

## 2021-08-11 PROCEDURE — 99214 OFFICE O/P EST MOD 30 MIN: CPT | Performed by: FAMILY MEDICINE

## 2021-08-11 PROCEDURE — 99385 PREV VISIT NEW AGE 18-39: CPT | Mod: S$PBB,,, | Performed by: FAMILY MEDICINE

## 2021-08-11 RX ORDER — EPINEPHRINE 0.3 MG/.3ML
INJECTION SUBCUTANEOUS
Qty: 2 EACH | Refills: 2 | Status: SHIPPED | OUTPATIENT
Start: 2021-08-11 | End: 2023-03-07

## 2021-08-11 RX ORDER — B-COMPLEX WITH VITAMIN C
1 TABLET ORAL DAILY
Qty: 30 TABLET | Refills: 11 | Status: SHIPPED | OUTPATIENT
Start: 2021-08-11 | End: 2022-08-11

## 2021-08-11 RX ORDER — MELOXICAM 7.5 MG/1
7.5 TABLET ORAL NIGHTLY
Qty: 10 TABLET | Refills: 0 | Status: SHIPPED | OUTPATIENT
Start: 2021-08-11 | End: 2022-01-10

## 2021-08-12 ENCOUNTER — LAB VISIT (OUTPATIENT)
Dept: LAB | Facility: HOSPITAL | Age: 26
End: 2021-08-12
Attending: FAMILY MEDICINE
Payer: MEDICAID

## 2021-08-12 DIAGNOSIS — I73.00 RAYNAUD'S DISEASE WITHOUT GANGRENE: ICD-10-CM

## 2021-08-12 DIAGNOSIS — M06.9 RHEUMATOID ARTHRITIS, INVOLVING UNSPECIFIED SITE, UNSPECIFIED WHETHER RHEUMATOID FACTOR PRESENT: ICD-10-CM

## 2021-08-12 DIAGNOSIS — Z00.00 PREVENTATIVE HEALTH CARE: ICD-10-CM

## 2021-08-12 LAB
ALBUMIN SERPL BCP-MCNC: 3.4 G/DL (ref 3.5–5.2)
ALP SERPL-CCNC: 38 U/L (ref 55–135)
ALT SERPL W/O P-5'-P-CCNC: 15 U/L (ref 10–44)
ANION GAP SERPL CALC-SCNC: 6 MMOL/L (ref 8–16)
AST SERPL-CCNC: 17 U/L (ref 10–40)
BASOPHILS # BLD AUTO: 0.01 K/UL (ref 0–0.2)
BASOPHILS NFR BLD: 0.3 % (ref 0–1.9)
BILIRUB SERPL-MCNC: 0.7 MG/DL (ref 0.1–1)
BUN SERPL-MCNC: 7 MG/DL (ref 6–20)
CALCIUM SERPL-MCNC: 8.7 MG/DL (ref 8.7–10.5)
CHLORIDE SERPL-SCNC: 106 MMOL/L (ref 95–110)
CHOLEST SERPL-MCNC: 169 MG/DL (ref 120–199)
CHOLEST/HDLC SERPL: 3.3 {RATIO} (ref 2–5)
CO2 SERPL-SCNC: 20 MMOL/L (ref 23–29)
CREAT SERPL-MCNC: 0.6 MG/DL (ref 0.5–1.4)
DIFFERENTIAL METHOD: ABNORMAL
EOSINOPHIL # BLD AUTO: 0 K/UL (ref 0–0.5)
EOSINOPHIL NFR BLD: 0.9 % (ref 0–8)
ERYTHROCYTE [DISTWIDTH] IN BLOOD BY AUTOMATED COUNT: 12.7 % (ref 11.5–14.5)
ERYTHROCYTE [SEDIMENTATION RATE] IN BLOOD BY WESTERGREN METHOD: 28 MM/HR (ref 0–20)
EST. GFR  (AFRICAN AMERICAN): >60 ML/MIN/1.73 M^2
EST. GFR  (NON AFRICAN AMERICAN): >60 ML/MIN/1.73 M^2
GLUCOSE SERPL-MCNC: 87 MG/DL (ref 70–110)
HCT VFR BLD AUTO: 40.1 % (ref 37–48.5)
HDLC SERPL-MCNC: 52 MG/DL (ref 40–75)
HDLC SERPL: 30.8 % (ref 20–50)
HGB BLD-MCNC: 13.7 G/DL (ref 12–16)
IMM GRANULOCYTES # BLD AUTO: 0 K/UL (ref 0–0.04)
IMM GRANULOCYTES NFR BLD AUTO: 0 % (ref 0–0.5)
LDLC SERPL CALC-MCNC: 101.6 MG/DL (ref 63–159)
LYMPHOCYTES # BLD AUTO: 1.7 K/UL (ref 1–4.8)
LYMPHOCYTES NFR BLD: 51.1 % (ref 18–48)
MCH RBC QN AUTO: 30.2 PG (ref 27–31)
MCHC RBC AUTO-ENTMCNC: 34.2 G/DL (ref 32–36)
MCV RBC AUTO: 88 FL (ref 82–98)
MONOCYTES # BLD AUTO: 0.4 K/UL (ref 0.3–1)
MONOCYTES NFR BLD: 12.4 % (ref 4–15)
NEUTROPHILS # BLD AUTO: 1.2 K/UL (ref 1.8–7.7)
NEUTROPHILS NFR BLD: 35.3 % (ref 38–73)
NONHDLC SERPL-MCNC: 117 MG/DL
NRBC BLD-RTO: 0 /100 WBC
PLATELET # BLD AUTO: 342 K/UL (ref 150–450)
PMV BLD AUTO: 9.1 FL (ref 9.2–12.9)
POTASSIUM SERPL-SCNC: 4.1 MMOL/L (ref 3.5–5.1)
PROT SERPL-MCNC: 7.8 G/DL (ref 6–8.4)
RBC # BLD AUTO: 4.54 M/UL (ref 4–5.4)
SODIUM SERPL-SCNC: 132 MMOL/L (ref 136–145)
TRIGL SERPL-MCNC: 77 MG/DL (ref 30–150)
WBC # BLD AUTO: 3.31 K/UL (ref 3.9–12.7)

## 2021-08-12 PROCEDURE — 80061 LIPID PANEL: CPT | Performed by: FAMILY MEDICINE

## 2021-08-12 PROCEDURE — 80053 COMPREHEN METABOLIC PANEL: CPT | Performed by: FAMILY MEDICINE

## 2021-08-12 PROCEDURE — 87491 CHLMYD TRACH DNA AMP PROBE: CPT | Performed by: FAMILY MEDICINE

## 2021-08-12 PROCEDURE — 85651 RBC SED RATE NONAUTOMATED: CPT | Performed by: FAMILY MEDICINE

## 2021-08-12 PROCEDURE — 86038 ANTINUCLEAR ANTIBODIES: CPT | Performed by: FAMILY MEDICINE

## 2021-08-12 PROCEDURE — 36415 COLL VENOUS BLD VENIPUNCTURE: CPT | Performed by: FAMILY MEDICINE

## 2021-08-12 PROCEDURE — 86431 RHEUMATOID FACTOR QUANT: CPT | Performed by: FAMILY MEDICINE

## 2021-08-12 PROCEDURE — 87591 N.GONORRHOEAE DNA AMP PROB: CPT | Performed by: FAMILY MEDICINE

## 2021-08-12 PROCEDURE — 85025 COMPLETE CBC W/AUTO DIFF WBC: CPT | Performed by: FAMILY MEDICINE

## 2021-08-13 LAB
ANA TITR SER IF: NEGATIVE {TITER}
RHEUMATOID FACT SERPL-ACNC: <10 IU/ML (ref 0–13.9)

## 2021-08-14 LAB
CHLAMYDIA, AMPLIFIED DNA: NEGATIVE
N GONORRHOEAE, AMPLIFIED DNA: NEGATIVE

## 2021-08-16 ENCOUNTER — TELEPHONE (OUTPATIENT)
Dept: FAMILY MEDICINE | Facility: CLINIC | Age: 26
End: 2021-08-16

## 2021-11-15 ENCOUNTER — HOSPITAL ENCOUNTER (EMERGENCY)
Facility: HOSPITAL | Age: 26
Discharge: HOME | End: 2021-11-15
Attending: EMERGENCY MEDICINE
Payer: MEDICAID

## 2021-11-15 VITALS
RESPIRATION RATE: 20 BRPM | TEMPERATURE: 99 F | HEIGHT: 64 IN | HEART RATE: 90 BPM | WEIGHT: 180 LBS | SYSTOLIC BLOOD PRESSURE: 148 MMHG | BODY MASS INDEX: 30.73 KG/M2 | OXYGEN SATURATION: 99 % | DIASTOLIC BLOOD PRESSURE: 70 MMHG

## 2021-11-15 DIAGNOSIS — S80.02XA CONTUSION OF LEFT KNEE, INITIAL ENCOUNTER: Primary | ICD-10-CM
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
## 2021-11-15 PROCEDURE — 25000003 PHARM REV CODE 250: Performed by: EMERGENCY MEDICINE

## 2021-11-15 PROCEDURE — 99283 EMERGENCY DEPT VISIT LOW MDM: CPT | Mod: 25

## 2021-11-15 RX ORDER — IBUPROFEN 600 MG/1
600 TABLET ORAL
Status: COMPLETED | OUTPATIENT
Start: 2021-11-15 | End: 2021-11-15

## 2021-11-15 RX ORDER — IBUPROFEN 600 MG/1
600 TABLET ORAL EVERY 6 HOURS PRN
Qty: 20 TABLET | Refills: 0 | Status: SHIPPED | OUTPATIENT
Start: 2021-11-15 | End: 2022-05-04

## 2021-11-15 RX ADMIN — IBUPROFEN 600 MG: 600 TABLET ORAL at 09:11

## 2021-11-18 ENCOUNTER — HOSPITAL ENCOUNTER (EMERGENCY)
Facility: HOSPITAL | Age: 26
Discharge: HOME | End: 2021-11-18
Attending: EMERGENCY MEDICINE
Payer: COMMERCIAL

## 2021-11-18 VITALS
HEART RATE: 75 BPM | HEIGHT: 64 IN | BODY MASS INDEX: 30.73 KG/M2 | WEIGHT: 180 LBS | OXYGEN SATURATION: 100 % | RESPIRATION RATE: 20 BRPM | TEMPERATURE: 99 F | SYSTOLIC BLOOD PRESSURE: 111 MMHG | DIASTOLIC BLOOD PRESSURE: 56 MMHG

## 2021-11-18 DIAGNOSIS — S16.1XXA STRAIN OF NECK MUSCLE, INITIAL ENCOUNTER: Primary | ICD-10-CM

## 2021-11-18 LAB
B-HCG UR QL: NEGATIVE
CTP QC/QA: YES

## 2021-11-18 PROCEDURE — 81025 URINE PREGNANCY TEST: CPT | Performed by: NURSE PRACTITIONER

## 2021-11-18 PROCEDURE — 25000003 PHARM REV CODE 250: Performed by: NURSE PRACTITIONER

## 2021-11-18 PROCEDURE — 99284 EMERGENCY DEPT VISIT MOD MDM: CPT | Mod: 25

## 2021-11-18 RX ORDER — LIDOCAINE 50 MG/G
1 PATCH TOPICAL
Status: DISCONTINUED | OUTPATIENT
Start: 2021-11-18 | End: 2021-11-18 | Stop reason: HOSPADM

## 2021-11-18 RX ORDER — LIDOCAINE 50 MG/G
1 PATCH TOPICAL DAILY
Qty: 5 PATCH | Refills: 0 | Status: SHIPPED | OUTPATIENT
Start: 2021-11-18 | End: 2022-05-04

## 2021-11-18 RX ORDER — METHOCARBAMOL 750 MG/1
750 TABLET, FILM COATED ORAL 3 TIMES DAILY PRN
Qty: 15 TABLET | Refills: 0 | Status: SHIPPED | OUTPATIENT
Start: 2021-11-18 | End: 2021-11-23

## 2021-11-18 RX ADMIN — LIDOCAINE 5% 1 PATCH: 700 PATCH TOPICAL at 10:11

## 2021-11-19 ENCOUNTER — OFFICE VISIT (OUTPATIENT)
Dept: FAMILY MEDICINE | Facility: CLINIC | Age: 26
End: 2021-11-19
Payer: MEDICAID

## 2021-11-19 VITALS
TEMPERATURE: 98 F | SYSTOLIC BLOOD PRESSURE: 130 MMHG | HEIGHT: 64 IN | OXYGEN SATURATION: 98 % | DIASTOLIC BLOOD PRESSURE: 78 MMHG | RESPIRATION RATE: 16 BRPM | BODY MASS INDEX: 34.88 KG/M2 | HEART RATE: 80 BPM | WEIGHT: 204.31 LBS

## 2021-11-19 DIAGNOSIS — F41.9 ANXIETY: Primary | ICD-10-CM

## 2021-11-19 DIAGNOSIS — S16.1XXA STRAIN OF NECK MUSCLE, INITIAL ENCOUNTER: ICD-10-CM

## 2021-11-19 PROCEDURE — 99213 OFFICE O/P EST LOW 20 MIN: CPT | Mod: S$PBB,,, | Performed by: FAMILY MEDICINE

## 2021-11-19 PROCEDURE — 99214 OFFICE O/P EST MOD 30 MIN: CPT | Performed by: FAMILY MEDICINE

## 2021-11-19 PROCEDURE — 99213 PR OFFICE/OUTPT VISIT, EST, LEVL III, 20-29 MIN: ICD-10-PCS | Mod: S$PBB,,, | Performed by: FAMILY MEDICINE

## 2021-11-19 RX ORDER — BUSPIRONE HYDROCHLORIDE 10 MG/1
10 TABLET ORAL 3 TIMES DAILY
Qty: 90 TABLET | Refills: 11 | Status: SHIPPED | OUTPATIENT
Start: 2021-11-19 | End: 2022-05-04

## 2021-11-19 RX ORDER — CYCLOBENZAPRINE HCL 10 MG
10 TABLET ORAL NIGHTLY
Qty: 30 TABLET | Refills: 0 | Status: SHIPPED | OUTPATIENT
Start: 2021-11-19 | End: 2021-12-19

## 2022-01-04 ENCOUNTER — OFFICE VISIT (OUTPATIENT)
Dept: FAMILY MEDICINE | Facility: CLINIC | Age: 27
End: 2022-01-04
Payer: MEDICAID

## 2022-01-04 VITALS
RESPIRATION RATE: 16 BRPM | HEART RATE: 81 BPM | OXYGEN SATURATION: 99 % | WEIGHT: 209 LBS | DIASTOLIC BLOOD PRESSURE: 78 MMHG | SYSTOLIC BLOOD PRESSURE: 107 MMHG | HEIGHT: 64 IN | TEMPERATURE: 99 F | BODY MASS INDEX: 35.68 KG/M2

## 2022-01-04 DIAGNOSIS — M62.838 SPASM OF CERVICAL PARASPINOUS MUSCLE: ICD-10-CM

## 2022-01-04 DIAGNOSIS — M06.9 RHEUMATOID ARTHRITIS, INVOLVING UNSPECIFIED SITE, UNSPECIFIED WHETHER RHEUMATOID FACTOR PRESENT: ICD-10-CM

## 2022-01-04 DIAGNOSIS — S83.005D PATELLAR DISLOCATION, LEFT, SUBSEQUENT ENCOUNTER: Primary | ICD-10-CM

## 2022-01-04 DIAGNOSIS — Z23 IMMUNIZATION DUE: ICD-10-CM

## 2022-01-04 PROCEDURE — 99214 OFFICE O/P EST MOD 30 MIN: CPT | Mod: 25,S$PBB,, | Performed by: FAMILY MEDICINE

## 2022-01-04 PROCEDURE — 90471 IMMUNIZATION ADMIN: CPT | Mod: PBBFAC | Performed by: FAMILY MEDICINE

## 2022-01-04 PROCEDURE — 99215 OFFICE O/P EST HI 40 MIN: CPT | Performed by: FAMILY MEDICINE

## 2022-01-04 PROCEDURE — 3074F PR MOST RECENT SYSTOLIC BLOOD PRESSURE < 130 MM HG: ICD-10-PCS | Mod: ,,, | Performed by: FAMILY MEDICINE

## 2022-01-04 PROCEDURE — 3078F PR MOST RECENT DIASTOLIC BLOOD PRESSURE < 80 MM HG: ICD-10-PCS | Mod: ,,, | Performed by: FAMILY MEDICINE

## 2022-01-04 PROCEDURE — 3074F SYST BP LT 130 MM HG: CPT | Mod: ,,, | Performed by: FAMILY MEDICINE

## 2022-01-04 PROCEDURE — 3008F BODY MASS INDEX DOCD: CPT | Mod: ,,, | Performed by: FAMILY MEDICINE

## 2022-01-04 PROCEDURE — 3078F DIAST BP <80 MM HG: CPT | Mod: ,,, | Performed by: FAMILY MEDICINE

## 2022-01-04 PROCEDURE — 3008F PR BODY MASS INDEX (BMI) DOCUMENTED: ICD-10-PCS | Mod: ,,, | Performed by: FAMILY MEDICINE

## 2022-01-04 PROCEDURE — 99214 PR OFFICE/OUTPT VISIT, EST, LEVL IV, 30-39 MIN: ICD-10-PCS | Mod: 25,S$PBB,, | Performed by: FAMILY MEDICINE

## 2022-01-04 RX ORDER — NORETHINDRONE ACETATE AND ETHINYL ESTRADIOL 1.5; 3 MG/1; UG/1
TABLET ORAL
COMMUNITY
Start: 2021-12-13 | End: 2024-03-20

## 2022-01-04 NOTE — PROGRESS NOTES
Subjective:       Patient ID: Albina Beckham is a 26 y.o. female.    Chief Complaint: Back Pain (F/U ) and Anxiety (F/u on meds)      Patient is here for 6 week follow-up on back pain.  The low back spasms have improved but she continues to have some problems with the neck and shoulders she works as a  and is standing for long periods at a time.  No radiculopathy into the hands.   Patient also had reconstructive surgery on the left knee because of patellar subluxation( age17) but continues to have pain on as side.            Back Pain  This is a chronic problem.   Anxiety            Allergies and Medications:   Review of patient's allergies indicates:   Allergen Reactions    Ciprofloxacin Anaphylaxis    Tamiflu [oseltamivir]      Current Outpatient Medications   Medication Sig Dispense Refill    AUROVELA 1.5/30, 21, 1.5-30 mg-mcg Tab TAKE 1 TABLET BY MOUTH EVERY DAY. DO NOT. STOP TO HAVE PERIOD. TAKE CONTINUOUSLY      busPIRone (BUSPAR) 10 MG tablet Take 1 tablet (10 mg total) by mouth 3 (three) times daily. 90 tablet 11    EPINEPHrine (EPIPEN 2-KIMANI) 0.3 mg/0.3 mL AtIn Inject Epipen as directed. 2 each 2    prenatal vit no.130-iron-folic (PRENATAL VITAMIN) 27 mg iron- 800 mcg Tab Take 1 tablet by mouth once daily. 30 tablet 11    docusate sodium (COLACE) 100 MG capsule Take 2 capsules (200 mg total) by mouth 2 (two) times daily as needed for Constipation. (Patient not taking: No sig reported)  0    ibuprofen (ADVIL,MOTRIN) 600 MG tablet Take 1 tablet (600 mg total) by mouth every 6 (six) hours as needed for Pain. (Patient not taking: No sig reported) 20 tablet 0    LIDOcaine (LIDODERM) 5 % Place 1 patch onto the skin once daily. (Patient not taking: No sig reported) 5 patch 0    meloxicam (MOBIC) 7.5 MG tablet Take 1 tablet (7.5 mg total) by mouth every evening. (Patient not taking: No sig reported) 10 tablet 0     No current facility-administered medications for this visit.       Family  History:   Family History   Problem Relation Age of Onset    Arthritis Father     Hypertension Father     Arthritis Mother     Diabetes Maternal Aunt     Diabetes Maternal Uncle     Diabetes Maternal Grandfather     Lupus Maternal Aunt     Melanoma Neg Hx     Psoriasis Neg Hx     Eczema Neg Hx        Social History:   Social History     Socioeconomic History    Marital status: Single    Number of children: 1   Occupational History    Occupation: bank teller     Comment: [employer]   Tobacco Use    Smoking status: Never Smoker    Smokeless tobacco: Never Used   Substance and Sexual Activity    Alcohol use: Not Currently    Drug use: No    Sexual activity: Yes     Partners: Male       Review of Systems   Musculoskeletal: Positive for back pain.       Objective:     Vitals:    01/04/22 0851   BP: 107/78   Pulse: 81   Resp: 16   Temp: 98.7 °F (37.1 °C)        Physical Exam  Musculoskeletal:        Arms:         Legs:          Assessment:       1. Patellar dislocation, left, subsequent encounter    2. Rheumatoid arthritis, involving unspecified site, unspecified whether rheumatoid factor present    3. Spasm of cervical paraspinous muscle    4. Immunization due        Plan:       Albina was seen today for back pain and anxiety.    Diagnoses and all orders for this visit:    Patellar dislocation, left, subsequent encounter  -     Ambulatory referral/consult to Orthopedics; Future  -     Ambulatory referral/consult to Physical/Occupational Therapy; Future    Rheumatoid arthritis, involving unspecified site, unspecified whether rheumatoid factor present  -     CBC Auto Differential; Future  -     Comprehensive Metabolic Panel; Future  -     Sedimentation rate; Future  -     Rheumatoid Factor; Future    Spasm of cervical paraspinous muscle  -     Ambulatory referral/consult to Orthopedics; Future  -     Ambulatory referral/consult to Physical/Occupational Therapy; Future    Immunization due  -      HPV Vaccine (9-Valent) (3 Dose) (IM)         Follow up in about 3 months (around 4/4/2022) for follow up multiple joint pain.

## 2022-01-10 ENCOUNTER — OFFICE VISIT (OUTPATIENT)
Dept: FAMILY MEDICINE | Facility: CLINIC | Age: 27
End: 2022-01-10
Payer: MEDICAID

## 2022-01-10 VITALS
HEART RATE: 92 BPM | SYSTOLIC BLOOD PRESSURE: 114 MMHG | BODY MASS INDEX: 35.68 KG/M2 | DIASTOLIC BLOOD PRESSURE: 74 MMHG | HEIGHT: 64 IN | OXYGEN SATURATION: 98 % | WEIGHT: 209 LBS | TEMPERATURE: 99 F

## 2022-01-10 DIAGNOSIS — S83.005D PATELLAR DISLOCATION, LEFT, SUBSEQUENT ENCOUNTER: ICD-10-CM

## 2022-01-10 DIAGNOSIS — L03.114 CELLULITIS OF LEFT UPPER ARM: Primary | ICD-10-CM

## 2022-01-10 DIAGNOSIS — T50.Z95A ADVERSE EFFECT OF VACCINE, INITIAL ENCOUNTER: ICD-10-CM

## 2022-01-10 PROCEDURE — 99214 OFFICE O/P EST MOD 30 MIN: CPT | Mod: S$PBB,,, | Performed by: NURSE PRACTITIONER

## 2022-01-10 PROCEDURE — 99215 OFFICE O/P EST HI 40 MIN: CPT | Performed by: NURSE PRACTITIONER

## 2022-01-10 PROCEDURE — 3078F DIAST BP <80 MM HG: CPT | Mod: ,,, | Performed by: NURSE PRACTITIONER

## 2022-01-10 PROCEDURE — 99214 PR OFFICE/OUTPT VISIT, EST, LEVL IV, 30-39 MIN: ICD-10-PCS | Mod: S$PBB,,, | Performed by: NURSE PRACTITIONER

## 2022-01-10 PROCEDURE — 3008F BODY MASS INDEX DOCD: CPT | Mod: ,,, | Performed by: NURSE PRACTITIONER

## 2022-01-10 PROCEDURE — 3078F PR MOST RECENT DIASTOLIC BLOOD PRESSURE < 80 MM HG: ICD-10-PCS | Mod: ,,, | Performed by: NURSE PRACTITIONER

## 2022-01-10 PROCEDURE — 3074F SYST BP LT 130 MM HG: CPT | Mod: ,,, | Performed by: NURSE PRACTITIONER

## 2022-01-10 PROCEDURE — 1160F RVW MEDS BY RX/DR IN RCRD: CPT | Mod: ,,, | Performed by: NURSE PRACTITIONER

## 2022-01-10 PROCEDURE — 3074F PR MOST RECENT SYSTOLIC BLOOD PRESSURE < 130 MM HG: ICD-10-PCS | Mod: ,,, | Performed by: NURSE PRACTITIONER

## 2022-01-10 PROCEDURE — 1160F PR REVIEW ALL MEDS BY PRESCRIBER/CLIN PHARMACIST DOCUMENTED: ICD-10-PCS | Mod: ,,, | Performed by: NURSE PRACTITIONER

## 2022-01-10 PROCEDURE — 3008F PR BODY MASS INDEX (BMI) DOCUMENTED: ICD-10-PCS | Mod: ,,, | Performed by: NURSE PRACTITIONER

## 2022-01-10 RX ORDER — NAPROXEN 500 MG/1
500 TABLET ORAL 2 TIMES DAILY WITH MEALS
Qty: 14 TABLET | Refills: 0 | Status: SHIPPED | OUTPATIENT
Start: 2022-01-10 | End: 2022-01-17

## 2022-01-10 RX ORDER — CEPHALEXIN 500 MG/1
500 CAPSULE ORAL EVERY 6 HOURS
Qty: 20 CAPSULE | Refills: 0 | Status: SHIPPED | OUTPATIENT
Start: 2022-01-10 | End: 2022-01-15

## 2022-01-10 NOTE — PATIENT INSTRUCTIONS
Patient Education       Cellulitis (Skin Infection) Discharge Instructions, Adult   About this topic   Cellulitis is a skin infection. All people have germs on their skin. Most of the time, these germs do not cause a problem. A skin infection means the germs have gotten into the layers of your skin. Germs can enter your body from a cut, scratch, or bite. The infected part gets red, warm, painful, swollen, and irritated. You need antibiotics to treat the infection. It is important to take all of your antibiotics even if you start to feel better. If not, your infection may come back and be more serious than before.     What care is needed at home?   · Ask your doctor what you need to do when you go home. Make sure you ask questions if you do not understand what the doctor says. This way you will know what you need to do.  · Prop your painful body part on pillows, keeping it above the level of your heart. This may help lessen pain and swelling.  · Keep your infected area clean and dry. Do not squeeze, scratch, or rub it. You can gently wash the area with soap and water or take a shower. Pat the area dry with a clean towel.  · Wash your hands before and after you touch your infected area.  · Do not put an antibiotic ointment on the infected area.  · Use clean, warm compresses to help ease pain and swelling. Wet a clean wash cloth or small towel with hot water. Put it on the area for 5 to 10 minutes.  · You can draw a line with a waterproof marker around the red area to see if it is getting bigger or smaller.  What follow-up care is needed?   · Your doctor may ask you to make visits to the office to check on your progress. Be sure to keep these visits.  · If surgery was needed to drain the infection, your doctor may not stitch the area closed. Keep this area clean and covered.  · If you do have stitches or staples, you will need to have them taken out. Your doctor will often want to do this in 1 to 2 weeks.  What drugs  may be needed?   The doctor may order drugs to:  · Treat the infection. Finish all of the antibiotics, even if the infection appears to have gone away.  · Help with pain and swelling  Will physical activity be limited?   Your activity may be decreased if a leg or arm is infected. It may be painful to move that part of the body. You may not feel well for a few days until the drug to treat the infection starts working.  What problems could happen?   · Infection in the blood. This is sepsis and is very serious.  · Bone infection  · Inflammation of the lymph vessels  · Inflammation of the heart  · Meningitis  · Shock  · Tissue death or gangrene  What can be done to prevent this health problem?   · Wear proper clothing and shoes to cover your body and prevent cuts and bruises.  · Keep your nails trimmed to avoid scratches.  · Keep skin moisturized. Use lotion that does not have fragrance added to avoid dry, cracked skin.  · Treat athlete's foot as directed by your doctor.  · If you have a chronic skin condition, talk with your doctor about how to keep it under the best control.  · If you have chronic swelling (edema) of an arm or leg, talk with your doctor about how to lower the swelling. Your doctor may want you to wear support stockings.  · Practice good hygiene. Wash your hands often with soap and water.       · If you often have fungal infections, ask your doctor if you should be using an antifungal drug to prevent the cellulitis from occurring again.  · If you scratch or cut your skin, wash the area carefully with soap and water.  When do I need to call the doctor?   · Signs of infection. These include a fever of 100.4°F (38°C) or higher, chills, or wound that will not heal.  · You have a fever of 100.4°F (38°C) or higher or chills.  · The area becomes more red, swollen, or painful.  · The redness or swelling spreads up your leg or arm or to a larger area.  · The infected area is not better after 3 days of taking  antibiotics.  Teach Back: Helping You Understand   The Teach Back Method helps you understand the information we are giving you. After you talk with the staff, tell them in your own words what you learned. This helps to make sure the staff has described each thing clearly. It also helps to explain things that may have been confusing. Before going home, make sure you can do these:  · I can tell you about my condition.  · I can tell you about to care for my wound.  · I can tell you what I will do if I have swelling, redness, or warmth around my wound.  Where can I learn more?   American Academy of Family Physicians  https://familydoctor.org/condition/cellulitis/   [facility] Choices  http://www.nhs.uk/Conditions/Cellulitis/Pages/Causes.aspx   Last Reviewed Date   2021-06-03  Consumer Information Use and Disclaimer   This information is not specific medical advice and does not replace information you receive from your health care provider. This is only a brief summary of general information. It does NOT include all information about conditions, illnesses, injuries, tests, procedures, treatments, therapies, discharge instructions or life-style choices that may apply to you. You must talk with your health care provider for complete information about your health and treatment options. This information should not be used to decide whether or not to accept your health care providers advice, instructions or recommendations. Only your health care provider has the knowledge and training to provide advice that is right for you.  Copyright   Copyright © 2021 [employer] and its affiliates and/or licensors. All rights reserved.

## 2022-01-10 NOTE — PROGRESS NOTES
SUBJECTIVE:      Patient ID: Albina Beckham is a 26 y.o. female.    Chief Complaint: Rash (Vaccinated in l arm on 1/4 with HPV. Had small lump in arm and today it has grown in size, is painful, hard and warm.)    26-year-old female presents to the clinic for a localized vaccine reaction.  Patient received the HPV vaccine on 01/04.  The vaccine was administered in the incorrect area and was given to her mid left upper arm.  After the shot she developed a small area of erythema.  Over the weekend the erythema got larger.  The area is warm, tender to touch, and itchy.  She denies swelling or pain to the elbow or shoulder.  She denies fever.       Family History   Problem Relation Age of Onset    Arthritis Father     Hypertension Father     Arthritis Mother     Diabetes Maternal Aunt     Diabetes Maternal Uncle     Diabetes Maternal Grandfather     Lupus Maternal Aunt     Melanoma Neg Hx     Psoriasis Neg Hx     Eczema Neg Hx       Social History     Socioeconomic History    Marital status: Single    Number of children: 1   Occupational History    Occupation:      Comment: [employer]   Tobacco Use    Smoking status: Never Smoker    Smokeless tobacco: Never Used   Substance and Sexual Activity    Alcohol use: Not Currently    Drug use: No    Sexual activity: Yes     Partners: Male     Current Outpatient Medications   Medication Sig Dispense Refill    AUROVELA 1.5/30, 21, 1.5-30 mg-mcg Tab TAKE 1 TABLET BY MOUTH EVERY DAY. DO NOT. STOP TO HAVE PERIOD. TAKE CONTINUOUSLY      busPIRone (BUSPAR) 10 MG tablet Take 1 tablet (10 mg total) by mouth 3 (three) times daily. 90 tablet 11    cephALEXin (KEFLEX) 500 MG capsule Take 1 capsule (500 mg total) by mouth every 6 (six) hours. for 5 days 20 capsule 0    docusate sodium (COLACE) 100 MG capsule Take 2 capsules (200 mg total) by mouth 2 (two) times daily as needed for Constipation. (Patient not taking: No sig reported)  0     EPINEPHrine (EPIPEN 2-KIMANI) 0.3 mg/0.3 mL AtIn Inject Epipen as directed. 2 each 2    ibuprofen (ADVIL,MOTRIN) 600 MG tablet Take 1 tablet (600 mg total) by mouth every 6 (six) hours as needed for Pain. (Patient not taking: No sig reported) 20 tablet 0    LIDOcaine (LIDODERM) 5 % Place 1 patch onto the skin once daily. (Patient not taking: No sig reported) 5 patch 0    naproxen (NAPROSYN) 500 MG tablet Take 1 tablet (500 mg total) by mouth 2 (two) times daily with meals. for 7 days 14 tablet 0    prenatal vit no.130-iron-folic (PRENATAL VITAMIN) 27 mg iron- 800 mcg Tab Take 1 tablet by mouth once daily. 30 tablet 11     No current facility-administered medications for this visit.     Review of patient's allergies indicates:   Allergen Reactions    Ciprofloxacin Anaphylaxis    Tamiflu [oseltamivir]       Past Medical History:   Diagnosis Date    Heart palpitations 2013    Juvenile arthritis     Juvenile rheumatoid arthritis      Past Surgical History:   Procedure Laterality Date    KNEE SURGERY Left 2013    reconstructive    rheuamtoid nodole removed Left 2004    Ring finger       Review of Systems   Constitutional: Negative for activity change, appetite change, chills, diaphoresis, fatigue, fever and unexpected weight change.   HENT: Negative for congestion, ear pain, sinus pressure, sore throat, trouble swallowing and voice change.    Eyes: Negative for pain, discharge and visual disturbance.   Respiratory: Negative for cough, chest tightness, shortness of breath and wheezing.    Cardiovascular: Negative for chest pain and palpitations.   Gastrointestinal: Negative for abdominal pain, constipation, diarrhea, nausea and vomiting.   Genitourinary: Negative for difficulty urinating, flank pain, frequency and urgency.   Musculoskeletal: Negative for back pain and joint swelling.   Skin: Positive for color change. Negative for rash.   Neurological: Negative for dizziness, seizures, syncope, weakness, numbness  "and headaches.   Hematological: Negative for adenopathy.   Psychiatric/Behavioral: Negative for dysphoric mood and sleep disturbance. The patient is not nervous/anxious.       OBJECTIVE:      Vitals:    01/10/22 1638   BP: 114/74   BP Location: Right arm   Patient Position: Sitting   BP Method: Large (Manual)   Pulse: 92   Temp: 98.6 °F (37 °C)   TempSrc: Oral   SpO2: 98%   Weight: 94.8 kg (209 lb)   Height: 5' 4" (1.626 m)     Physical Exam  Vitals and nursing note reviewed.   Constitutional:       General: She is awake. She is not in acute distress.     Appearance: Normal appearance. She is obese. She is not ill-appearing, toxic-appearing or diaphoretic.   HENT:      Head: Normocephalic and atraumatic.      Nose: Nose normal.   Eyes:      General: Lids are normal. Gaze aligned appropriately.      Conjunctiva/sclera: Conjunctivae normal.      Right eye: Right conjunctiva is not injected.      Left eye: Left conjunctiva is not injected.      Pupils: Pupils are equal, round, and reactive to light.   Cardiovascular:      Rate and Rhythm: Normal rate and regular rhythm.      Pulses: Normal pulses.      Heart sounds: Normal heart sounds, S1 normal and S2 normal. No murmur heard.  No friction rub. No gallop.    Pulmonary:      Effort: Pulmonary effort is normal. No respiratory distress.      Breath sounds: Normal breath sounds. No stridor. No decreased breath sounds, wheezing, rhonchi or rales.   Chest:      Chest wall: No tenderness.   Musculoskeletal:      Cervical back: Neck supple.      Right lower leg: No edema.      Left lower leg: No edema.   Lymphadenopathy:      Cervical: No cervical adenopathy.   Skin:     General: Skin is warm and dry.      Capillary Refill: Capillary refill takes less than 2 seconds.      Findings: Erythema present. No rash.          Neurological:      Mental Status: She is alert and oriented to person, place, and time. Mental status is at baseline.   Psychiatric:         Attention and " Perception: Attention normal.         Mood and Affect: Mood normal.         Speech: Speech normal.         Behavior: Behavior normal. Behavior is cooperative.         Thought Content: Thought content normal.         Judgment: Judgment normal.        Assessment:       1. Cellulitis of left upper arm    2. Adverse effect of vaccine, initial encounter    3. Patellar dislocation, left, subsequent encounter        Plan:       Cellulitis of left upper arm  Erythema continues to get larger.  Start Keflex and Naproxen.  Take medications as prescribed.  Return if symptoms fail to improve.  -     cephALEXin (KEFLEX) 500 MG capsule; Take 1 capsule (500 mg total) by mouth every 6 (six) hours. for 5 days  Dispense: 20 capsule; Refill: 0  -     naproxen (NAPROSYN) 500 MG tablet; Take 1 tablet (500 mg total) by mouth 2 (two) times daily with meals. for 7 days  Dispense: 14 tablet; Refill: 0    Adverse effect of vaccine, initial encounter  Cold compress and NSAIDs.  Use otc benadryl for itching.   -     cephALEXin (KEFLEX) 500 MG capsule; Take 1 capsule (500 mg total) by mouth every 6 (six) hours. for 5 days  Dispense: 20 capsule; Refill: 0  -     naproxen (NAPROSYN) 500 MG tablet; Take 1 tablet (500 mg total) by mouth 2 (two) times daily with meals. for 7 days  Dispense: 14 tablet; Refill: 0    Patellar dislocation, left, subsequent encounter  Patient needed a new referral to Dr. Baldwin.   -     Ambulatory referral/consult to Orthopedics; Future; Expected date: 01/17/2022    This note was created using [employer] voice recognition software that occasionally misinterprets phrases or words.     Follow up if symptoms worsen or fail to improve.      1/10/2022 CLARKE Kwan, FNP

## 2022-01-11 ENCOUNTER — CLINICAL SUPPORT (OUTPATIENT)
Dept: REHABILITATION | Facility: HOSPITAL | Age: 27
End: 2022-01-11
Attending: FAMILY MEDICINE
Payer: MEDICAID

## 2022-01-11 DIAGNOSIS — S83.005D PATELLAR DISLOCATION, LEFT, SUBSEQUENT ENCOUNTER: ICD-10-CM

## 2022-01-11 DIAGNOSIS — R29.898 DECREASED STRENGTH OF LOWER EXTREMITY: ICD-10-CM

## 2022-01-11 DIAGNOSIS — M25.669 DECREASED RANGE OF MOTION OF KNEE, UNSPECIFIED LATERALITY: ICD-10-CM

## 2022-01-11 DIAGNOSIS — M62.838 SPASM OF CERVICAL PARASPINOUS MUSCLE: ICD-10-CM

## 2022-01-11 PROCEDURE — 97161 PT EVAL LOW COMPLEX 20 MIN: CPT | Mod: PN

## 2022-01-15 PROBLEM — M25.669 DECREASED RANGE OF MOTION OF KNEE: Status: ACTIVE | Noted: 2022-01-15

## 2022-01-15 PROBLEM — R29.898 DECREASED STRENGTH OF LOWER EXTREMITY: Status: ACTIVE | Noted: 2022-01-15
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
## 2022-01-20 ENCOUNTER — CLINICAL SUPPORT (OUTPATIENT)
Dept: REHABILITATION | Facility: HOSPITAL | Age: 27
End: 2022-01-20
Payer: MEDICAID

## 2022-01-20 DIAGNOSIS — M25.669 DECREASED RANGE OF MOTION OF KNEE, UNSPECIFIED LATERALITY: ICD-10-CM

## 2022-01-20 DIAGNOSIS — R29.898 DECREASED STRENGTH OF LOWER EXTREMITY: ICD-10-CM

## 2022-01-20 PROCEDURE — 97110 THERAPEUTIC EXERCISES: CPT | Mod: PN | Performed by: PHYSICAL THERAPIST

## 2022-01-20 NOTE — PROGRESS NOTES
[facility]   Physical Therapy Treatment Note     Name: Albina Beckham  Clinic Number: [id number]    Therapy Diagnosis:   Encounter Diagnoses   Name Primary?    Decreased range of motion of knee, unspecified laterality     Decreased strength of lower extremity      Physician: Fredy Leach MD    Visit Date: 1/20/2022    Evaluation Date: 1/11/2022  Authorization Period Expiration: 2/11/2022  Plan of Care Expiration: 3/30/2022  Progress Note Due: 2/15/2022  Visit # / Visits authorized: 1/1   FOTO: 1/3         Time In: 1815  Time Out: 1900  Total Billable Time: 45 minutes    PTA Visit #: 0/5     Precautions: Standard    Insurance: Payor: MEDICAID / Plan: [employer] / Product Type: Managed Medicaid /     SUBJECTIVE     Pt reports: continued medial knee pain bilaterally.  She was compliant with home exercise program.  Response to previous treatment: no complaints  Functional change: first visit since eval    Pain: 5/10  Location: bilateral knee  (Right >left today)    OBJECTIVE     Flexibility Left Right   Hamstrings 42 degrees 40 degrees   Achilles 2 degrees 0 degrees       Treatment       Felipa received the treatments listed below:      THERAPEUTIC EXERCISES to develop strength, ROM and flexibility for 45: minutes including :     Seated Hamstring stretch 3 x 30 sec B  Seated Gastroc-soleus stretch 3 x 30 sec B  Doming 3 x 10 B    Bridging 3 x 10  Hook lying hip abduction with Green theraband   Supine adductor squeeze 3 x 10  Prone hip extension with knee flexion 3 x 10  Standing TKA with ball    Lateral side step with red sport cord 2 x 10 yards    Precor Hip abduction 3 x 10 120#    Precor Leg Press seat 7, 3 x 10 60#        Patient Education and Home Exercises     Home Exercises Provided and Patient Education Provided     Education provided:   - Patient educated on the use on Spenco 3/4 length shoe inserts to promote arch support    Written Home Exercises Provided: Patient  instructed to cont prior HEP. Exercises were reviewed and Felipa was able to demonstrate them prior to the end of the session.  Felipa demonstrated good  understanding of the education provided. See EMR under Patient Instructions for exercises provided during therapy sessions    ASSESSMENT     The patient present to PT with c/o B knee pain. Bilateral pes planus was noted with observation of gait without shoes. The patient will benefit from continued skilled PT to promote LE strengthening and flexibility.    Felipa Is progressing well towards her goals.   Pt prognosis is Good.     Pt will continue to benefit from skilled outpatient physical therapy to address the deficits listed in the problem list box on initial evaluation, provide pt/family education and to maximize pt's level of independence in the home and community environment.     Pt's spiritual, cultural and educational needs considered and pt agreeable to plan of care and goals.     Anticipated barriers to physical therapy: none    Goals:   Short Term Goals: 4-6 weeks   1. Patient will be independent in initial HEP to help supplement PT.  2. Patient will be able to stand for 30 min with minimal pain of </= 3/10 to show improvement in condition and to help with job duties.  3. Patient will improve right knee flexion range of motion to equal to left to help with gait mechanics.   4. Patient will improve single leg balance on right lower extremity to 30 sec to help with gait and ADLs.      Long Term Goals: 10-12 weeks   1. Patient will be independent in updated HEP to help supplement PT and modulate symptoms  2. Patient will improve FOTO limitation score to </= 31% limited to show improvement in condition.   3. Patient will be able to stand for 1 hour with minimal pain </= 2/10 to help with ADLs and job duties.   4. Patient will improve hip abduction strength to >/= 4+/5 to help with gait mechanics and stairs.   5. Patient will be able to perform 5 sit to stands with  minimal to no pain to help with job duties.        PLAN     Continue with physical therapy as per plan of care      Satya Sánchez, PT

## 2022-01-27 ENCOUNTER — CLINICAL SUPPORT (OUTPATIENT)
Dept: REHABILITATION | Facility: HOSPITAL | Age: 27
End: 2022-01-27
Payer: MEDICAID

## 2022-01-27 DIAGNOSIS — S83.005D PATELLAR DISLOCATION, LEFT, SUBSEQUENT ENCOUNTER: Primary | ICD-10-CM

## 2022-01-27 DIAGNOSIS — R29.898 DECREASED STRENGTH OF LOWER EXTREMITY: ICD-10-CM

## 2022-01-27 DIAGNOSIS — M25.669 DECREASED RANGE OF MOTION OF KNEE, UNSPECIFIED LATERALITY: ICD-10-CM

## 2022-01-27 PROCEDURE — 97110 THERAPEUTIC EXERCISES: CPT | Mod: PN | Performed by: PHYSICAL THERAPIST

## 2022-01-27 NOTE — PROGRESS NOTES
[facility] OUTPATIENT THERAPY AND WELLNESS   Physical Therapy Treatment Note     Name: Albina Beckham  [facility] Number: [id number]    Therapy Diagnosis:   Encounter Diagnoses   Name Primary?    Decreased range of motion of knee, unspecified laterality     Decreased strength of lower extremity     Patellar dislocation, left, subsequent encounter Yes     Physician: Fredy Leach MD    Visit Date: 1/27/2022    Evaluation Date: 1/11/2022  Authorization Period Expiration: 2/11/2022  Plan of Care Expiration: 3/30/2022  Progress Note Due: 2/15/2022  Visit # / Visits authorized: 2/6   FOTO: 1/3         Time In: 1830  Time Out: 1715  Total Billable Time: 45 minutes    PTA Visit #: 0/5     Precautions: Standard    Insurance: Payor: MEDICAID / Plan: [employer] / Product Type: Managed Medicaid /     SUBJECTIVE     Pt reports: she purchase the Spenco inserts. She reports continued popping in B knees but has noted some decrease in pain since starting with the inserts.  She was compliant with home exercise program.  Response to previous treatment: no complaints  Functional change: first visit since eval    Pain: 4/10  Location: bilateral knee  (Right >left today)    OBJECTIVE         Treatment       Felipa received the treatments listed below:      THERAPEUTIC EXERCISES to develop strength, ROM and flexibility for 45: minutes including :     Seated Hamstring stretch 3 x 30 sec B  Seated Gastroc-soleus stretch 3 x 30 sec B  Doming 3 x 10 B    Bridging 3 x 10  Hook lying hip abduction with Green theraband   Supine adductor squeeze 3 x 10  Prone hip extension with knee flexion 3 x 10  Standing TKA with ball    Lateral side step with red sport cord 2 x 10 yards    Precor Hip abduction 3 x 10 120#    Precor Leg Press seat 7, 3 x 10 60#        Patient Education and Home Exercises     Home Exercises Provided and Patient Education Provided     Education provided:   - Patient educated on the use on Spenco 3/4 length shoe  inserts to promote arch support    Written Home Exercises Provided: Patient instructed to cont prior HEP. Exercises were reviewed and Felipa was able to demonstrate them prior to the end of the session.  Felipa demonstrated good  understanding of the education provided. See EMR under Patient Instructions for exercises provided during therapy sessions    ASSESSMENT     The patient presents to PT with some decrease in B knee pain with the start of using Spenco inserts . The patient will benefit from continued skilled PT to promote LE strengthening and flexibility.    Felipa Is progressing well towards her goals.   Pt prognosis is Good.     Pt will continue to benefit from skilled outpatient physical therapy to address the deficits listed in the problem list box on initial evaluation, provide pt/family education and to maximize pt's level of independence in the home and community environment.     Pt's spiritual, cultural and educational needs considered and pt agreeable to plan of care and goals.     Anticipated barriers to physical therapy: none    Goals:   Short Term Goals: 4-6 weeks   1. Patient will be independent in initial HEP to help supplement PT.  2. Patient will be able to stand for 30 min with minimal pain of </= 3/10 to show improvement in condition and to help with job duties.  3. Patient will improve right knee flexion range of motion to equal to left to help with gait mechanics.   4. Patient will improve single leg balance on right lower extremity to 30 sec to help with gait and ADLs.      Long Term Goals: 10-12 weeks   1. Patient will be independent in updated HEP to help supplement PT and modulate symptoms  2. Patient will improve FOTO limitation score to </= 31% limited to show improvement in condition.   3. Patient will be able to stand for 1 hour with minimal pain </= 2/10 to help with ADLs and job duties.   4. Patient will improve hip abduction strength to >/= 4+/5 to help with gait mechanics and stairs.    5. Patient will be able to perform 5 sit to stands with minimal to no pain to help with job duties.        PLAN     Continue with physical therapy as per plan of care      Satya Sánchez, PT

## 2022-02-01 ENCOUNTER — CLINICAL SUPPORT (OUTPATIENT)
Dept: REHABILITATION | Facility: HOSPITAL | Age: 27
End: 2022-02-01
Payer: MEDICAID

## 2022-02-01 DIAGNOSIS — M25.669 DECREASED RANGE OF MOTION OF KNEE, UNSPECIFIED LATERALITY: ICD-10-CM

## 2022-02-01 DIAGNOSIS — R29.898 DECREASED STRENGTH OF LOWER EXTREMITY: ICD-10-CM

## 2022-02-01 PROCEDURE — 97110 THERAPEUTIC EXERCISES: CPT | Mod: PN

## 2022-02-01 NOTE — PROGRESS NOTES
[facility]   Physical Therapy Treatment Note     Name: Albina Cordoba [facility] Number: [id number]    Therapy Diagnosis:   Encounter Diagnoses   Name Primary?    Decreased range of motion of knee, unspecified laterality     Decreased strength of lower extremity      Physician: Fredy Leach MD    Visit Date: 2/1/2022    Evaluation Date: 1/11/2022  Authorization Period Expiration: 2/11/2022  Plan of Care Expiration: 3/30/2022  Progress Note Due: 2/15/2022  Visit # / Visits authorized: 3/6   FOTO: 1/3     Time In: 11:56 am  Time Out: 12:45 pm  Total Billable Time: 49 minutes    PTA Visit #: 0/5     Precautions: Standard    Insurance: Payor: MEDICAID / Plan: [employer] / Product Type: Managed Medicaid /     SUBJECTIVE     Pt reports: Her knees are still popping and feels like are getting worse. Bending still has pain with both, standing mostly the right knee. She localizes the pain to right underneath her knee cap. She has been wearing the shoe inserts which has been helping a little.   She was compliant with home exercise program.  Response to previous treatment: a little sore  Functional change: none reported     Pain: 4/10  Location: bilateral knee  (Right >left today)    OBJECTIVE     Observation (2/1/2022): Patient presents to therapy ambulating without an assistive device or assistance. She displays decreased hip extension bilaterally. Patient's standing posture bilaterally feet collapse into pronation with L>R, she displays genu valgus bilaterally, and increased external rotation on the right compared to left.     Treatment       Felipa received the treatments listed below:      therapeutic exercises to develop strength, endurance, ROM and flexibility for 49 minutes including:    Supine hamstring stretch with strap 3x20 sec each  Straight Leg Raise 2x10 reps each side (2# weight on R, no weight on L)  Sidelying clamshells with RedTB 3x12 each   Shuttle Double Leg Squats  "3x10 reps with 50#   - modified range of motion   Shuttle Single Leg Squat 2x10 reps each with 25#   - modified range of motion  Lateral band walks 2x10 yds each with RedTB  Short arches (talar doming) 2x15 reps with 5 sec holds   Standing TKE with GreenTB and talar doming 2x10 reps with 3 sec holds     Not Today:  Seated Hamstring stretch 3 x 30 sec B  Seated Gastroc-soleus stretch 3 x 30 sec B  Bridging 3 x 10  Hook lying hip abduction with Green theraband   Supine adductor squeeze 3 x 10  Prone hip extension with knee flexion 3 x 10  Precor Hip abduction 3 x 10 120#  Precor Leg Press seat 7, 3 x 10 60#    Patient Education and Home Exercises     Home Exercises Provided and Patient Education Provided     Education provided:   - Patient educated on importance of hip and quad strengthening as well as working on her foot posture to help avoid increased stress on her knees.     Written Home Exercises Provided: Patient instructed to cont prior HEP. Exercises were reviewed and Felipa was able to demonstrate them prior to the end of the session.  Felipa demonstrated good  understanding of the education provided. See EMR under Patient Instructions for exercises provided during therapy sessions    ASSESSMENT     Felipa tolerated PT session fairly well with continued complaints of increased "popping" in her knee with certain movements. She was able to perform most exercises without issue and her range of motion was modified to avoid increased stress on her knees and will gradually work on increasing. She displayed difficulty riddhi her quad on her left lower extremity and required PT assist for superior glide to help improve contraction and further emphasis on quad strength in follow-ups. Felipa demonstrates bilateral collapse of her medial arch in standing posture with left greater than right and emphasis on improving foot intrinsic musculature today. Will continue to monitor and progress as able.     Felipa Is progressing well " towards her goals.   Pt prognosis is Good.     Pt will continue to benefit from skilled outpatient physical therapy to address the deficits listed in the problem list box on initial evaluation, provide pt/family education and to maximize pt's level of independence in the home and community environment.     Pt's spiritual, cultural and educational needs considered and pt agreeable to plan of care and goals.     Anticipated barriers to physical therapy: none    Goals:   Short Term Goals: 4-6 weeks (progressing, not met)  1. Patient will be independent in initial HEP to help supplement PT.  2. Patient will be able to stand for 30 min with minimal pain of </= 3/10 to show improvement in condition and to help with job duties.  3. Patient will improve right knee flexion range of motion to equal to left to help with gait mechanics.   4. Patient will improve single leg balance on right lower extremity to 30 sec to help with gait and ADLs.      Long Term Goals: 10-12 weeks (progressing, not met)  1. Patient will be independent in updated HEP to help supplement PT and modulate symptoms  2. Patient will improve FOTO limitation score to </= 31% limited to show improvement in condition.   3. Patient will be able to stand for 1 hour with minimal pain </= 2/10 to help with ADLs and job duties.   4. Patient will improve hip abduction strength to >/= 4+/5 to help with gait mechanics and stairs.   5. Patient will be able to perform 5 sit to stands with minimal to no pain to help with job duties.        PLAN   Plan of care Certification: 1/11/2022 to 3/30/2022.    Continue with current plan of care with emphasis on quad/hip strength, foot intrinsic strengthening, balance/proprioception training, and flexibility.    Deanna Lisa, PT   Co-treated by: Kenn Weber

## 2022-02-15 ENCOUNTER — CLINICAL SUPPORT (OUTPATIENT)
Dept: REHABILITATION | Facility: HOSPITAL | Age: 27
End: 2022-02-15
Payer: MEDICAID

## 2022-02-15 DIAGNOSIS — M25.669 DECREASED RANGE OF MOTION OF KNEE, UNSPECIFIED LATERALITY: ICD-10-CM

## 2022-02-15 DIAGNOSIS — R29.898 DECREASED STRENGTH OF LOWER EXTREMITY: ICD-10-CM

## 2022-02-15 PROCEDURE — 97110 THERAPEUTIC EXERCISES: CPT | Mod: PN

## 2022-02-15 NOTE — PROGRESS NOTES
"[facility] OUTPAT[facility]ERAP[facility] AND [facility]LL[facility]   Physical Therapy Re-Assessment/Treatment Note     Name: Albina Beckham  Clinic Number: [id number]    Therapy Diagnosis:   Encounter Diagnoses   Name Primary?    Decreased range of motion of knee, unspecified laterality     Decreased strength of lower extremity      Physician: Fredy Leach MD    Visit Date: 2/15/2022    Evaluation Date: 1/11/2022  Authorization Period Expiration: 4/20/2022  Plan of Care Expiration: 3/30/2022  Progress Note Due: 2/15/2022  Visit # / Visits authorized: 4/6   FOTO: 1/3     Time In: 09:46 am  Time Out: 10:29 am  Total Billable Time: 43 minutes    PTA Visit #: 0/5     Precautions: Standard    Insurance: Payor: MEDICAID / Plan: [employer] CONNECT / Product Type: Managed Medicaid /     SUBJECTIVE     Pt reports: She feels like since starting physical therapy she has been able to squat a little deeper with less pain and stand a little longer with less pain. She still has difficulty with deeper squatting as she feels unstable and like her knee is "going to pop out" on the left more so than the right.   She was compliant with home exercise program.  Response to previous treatment: a little sore  Functional change: able to stand longer and squat deeper    Pain: 4/10  Location: bilateral knee  (left > right)    OBJECTIVE     Observation (2/15/2022):     Gait: Pt ambulates without an assistive device and displays decreased hip extension and decreased heel strike.     Posture: Pt's standing posture presents with bilateral knee genu valgus and genu recurvatum with increased pronation in weight bearing on right lower extremity.       Range of Motion:  Hip Range of Motion (Passive)    Right  Left    Flexion 110 degrees  110 degrees    External Rotation 55 degrees  45 degrees    Internal Rotation 35 degrees  45 degrees      Knee Range of Motion:    Right (AROM) Left (AROM)   Flexion 135 degrees  135 degrees    Extension 5 degrees of hyperextension " 6 degrees of hyperextension      Lower Extremity Strength (MMT):    Right Left    Hip Flexion 4/5 4/5   Hip Abduction 4-/5 4/5   Hip Extension 4-/5 4-/5   Knee Flexion 4/5 4/5   Knee Extension 4/5 4/5   Dorsiflexion 4+/5 4+/5     Special Tests:  - Single Leg Balance: Left = 30 sec with slight swaying, R = 26 sec with increased swaying and unsteadiness     Joint Mobility: decreased talocrural joint mobility bilaterally     Treatment       Felipa received the treatments listed below:      therapeutic exercises to develop strength, endurance, ROM and flexibility for 34 minutes including:    Assessment as above   Self mobilization half kneeling for dorsiflexion 1x15 reps with 3 sec holds each side  Short arches (talar doming) 2x15 reps with 5 sec holds  Standing TKE with GreenTB and talar doming 2x10 reps with 3 sec holds   Straight Leg Raise 2x10 reps each side (2# weight on R, no weight on L)  Shuttle Double Leg Squats 3x12 reps with 50# with RedTB around knees   - modified range of motion   Shuttle Single Leg Squat 2x10 reps each with 25#   - modified range of motion  Hip abduction against the wall (runner's stance) with RedTB 2x10 reps    Not Today:  Seated Hamstring stretch 3 x 30 sec B  Seated Gastroc-soleus stretch 3 x 30 sec B  Bridging 3 x 10  Supine adductor squeeze 3 x 10  Prone hip extension with knee flexion 3 x 10  Precor Hip abduction 3 x 10 120#  Precor Leg Press seat 7, 3 x 10 60#  Sidelying clamshells with RedTB 3x12 each   Lateral band walks 2x10 yds each with RedTB  Supine hamstring stretch with strap 3x20 sec each  Donkey kicks on the shuttle    Felipa received the following manual therapy techniques: Joint mobilizations, Myofacial release, and Soft tissue Mobilization were applied to the: bilateral knees and left ankle for 9 minutes, including:    Patella mobilizations grade III  Talocrural anterior to posterior glides grade III    Patient Education and Home Exercises     Home Exercises Provided and  Patient Education Provided     Education provided:   - Patient educated on importance of hip and quad strengthening as well as working on her foot posture to help avoid increased stress on her knees.     Written Home Exercises Provided: Patient instructed to cont prior HEP. Exercises were reviewed and Felipa was able to demonstrate them prior to the end of the session.  Felipa demonstrated good  understanding of the education provided. See EMR under Patient Instructions for exercises provided during therapy sessions    ASSESSMENT     Felipa has been seen for a total of 4 visits for patellar dislocation. She has shown improvements in her range of motion and strength as well as functional improvements from subjective reports and improved squatting range and tolerance to standing. She continues to be limited in hip/glute strength limiting her functional ability for full squats without reports of instability as well as limited talocrural mobility increasing stress on her knee limiting her squatting potential as well. She would continue to benefit from further physical therapy services to address her deficits and continue to improve her functional mobility with emphasis on joint mobility, balance/proprioception training, and hip/glute strengthening. Will continue to monitor and progress as able.     Felipa Is progressing well towards her goals.   Pt prognosis is Good.     Pt will continue to benefit from skilled outpatient physical therapy to address the deficits listed in the problem list box on initial evaluation, provide pt/family education and to maximize pt's level of independence in the home and community environment.     Pt's spiritual, cultural and educational needs considered and pt agreeable to plan of care and goals.     Anticipated barriers to physical therapy: none    Goals:   Short Term Goals: 4-6 weeks (progressing, not met)  1. Patient will be independent in initial HEP to help supplement PT. - MET  2. Patient will  be able to stand for 30 min with minimal pain of </= 3/10 to show improvement in condition and to help with job duties. - MET  3. Patient will improve right knee flexion range of motion to equal to left to help with gait mechanics. - MET  4. Patient will improve single leg balance on right lower extremity to 30 sec to help with gait and ADLs.      Long Term Goals: 10-12 weeks (progressing, not met)  1. Patient will be independent in updated HEP to help supplement PT and modulate symptoms  2. Patient will improve FOTO limitation score to </= 31% limited to show improvement in condition.   3. Patient will be able to stand for 1 hour with minimal pain </= 2/10 to help with ADLs and job duties.   4. Patient will improve hip abduction strength to >/= 4+/5 to help with gait mechanics and stairs.   5. Patient will be able to perform 5 sit to stands with minimal to no pain to help with job duties.        PLAN   Plan of care Certification: 1/11/2022 to 3/30/2022.    Patient to be seen 1 time a week for another 6 weeks to continue to improve overall functional mobility. Continue with emphasis on joint mobility, hip/glute strengthening, foot intrinsic strengthening, and balance/proprioception training. Will continue monitor and progress as able.     Deanna Lisa, PT

## 2022-02-20 NOTE — PLAN OF CARE
"[facility]   Physical Therapy Re-Assessment/Treatment Note     Name: Albina Beckham  Clinic Number: [id number]    Therapy Diagnosis:   Encounter Diagnoses   Name Primary?    Decreased range of motion of knee, unspecified laterality     Decreased strength of lower extremity      Physician: Fredy Leach MD    Visit Date: 2/15/2022    Evaluation Date: 1/11/2022  Authorization Period Expiration: 4/20/2022  Plan of Care Expiration: 3/30/2022  Progress Note Due: 2/15/2022  Visit # / Visits authorized: 4/6   FOTO: 1/3     Time In: 09:46 am  Time Out: 10:29 am  Total Billable Time: 43 minutes    PTA Visit #: 0/5     Precautions: Standard    Insurance: Payor: MEDICAID / Plan: [employer] CONNECT / Product Type: Managed Medicaid /     SUBJECTIVE     Pt reports: She feels like since starting physical therapy she has been able to squat a little deeper with less pain and stand a little longer with less pain. She still has difficulty with deeper squatting as she feels unstable and like her knee is "going to pop out" on the left more so than the right.   She was compliant with home exercise program.  Response to previous treatment: a little sore  Functional change: able to stand longer and squat deeper    Pain: 4/10  Location: bilateral knee  (left > right)    OBJECTIVE     Observation (2/15/2022):     Gait: Pt ambulates without an assistive device and displays decreased hip extension and decreased heel strike.     Posture: Pt's standing posture presents with bilateral knee genu valgus and genu recurvatum with increased pronation in weight bearing on right lower extremity.       Range of Motion:  Hip Range of Motion (Passive)    Right  Left    Flexion 110 degrees  110 degrees    External Rotation 55 degrees  45 degrees    Internal Rotation 35 degrees  45 degrees      Knee Range of Motion:    Right (AROM) Left (AROM)   Flexion 135 degrees  135 degrees    Extension 5 degrees of hyperextension " 6 degrees of hyperextension      Lower Extremity Strength (MMT):    Right Left    Hip Flexion 4/5 4/5   Hip Abduction 4-/5 4/5   Hip Extension 4-/5 4-/5   Knee Flexion 4/5 4/5   Knee Extension 4/5 4/5   Dorsiflexion 4+/5 4+/5     Special Tests:  - Single Leg Balance: Left = 30 sec with slight swaying, R = 26 sec with increased swaying and unsteadiness     Joint Mobility: decreased talocrural joint mobility bilaterally     Treatment       Felipa received the treatments listed below:      therapeutic exercises to develop strength, endurance, ROM and flexibility for 34 minutes including:    Assessment as above   Self mobilization half kneeling for dorsiflexion 1x15 reps with 3 sec holds each side  Short arches (talar doming) 2x15 reps with 5 sec holds  Standing TKE with GreenTB and talar doming 2x10 reps with 3 sec holds   Straight Leg Raise 2x10 reps each side (2# weight on R, no weight on L)  Shuttle Double Leg Squats 3x12 reps with 50# with RedTB around knees   - modified range of motion   Shuttle Single Leg Squat 2x10 reps each with 25#   - modified range of motion  Hip abduction against the wall (runner's stance) with RedTB 2x10 reps    Not Today:  Seated Hamstring stretch 3 x 30 sec B  Seated Gastroc-soleus stretch 3 x 30 sec B  Bridging 3 x 10  Supine adductor squeeze 3 x 10  Prone hip extension with knee flexion 3 x 10  Precor Hip abduction 3 x 10 120#  Precor Leg Press seat 7, 3 x 10 60#  Sidelying clamshells with RedTB 3x12 each   Lateral band walks 2x10 yds each with RedTB  Supine hamstring stretch with strap 3x20 sec each  Donkey kicks on the shuttle    Felipa received the following manual therapy techniques: Joint mobilizations, Myofacial release, and Soft tissue Mobilization were applied to the: bilateral knees and left ankle for 9 minutes, including:    Patella mobilizations grade III  Talocrural anterior to posterior glides grade III    Patient Education and Home Exercises     Home Exercises Provided and  Patient Education Provided     Education provided:   - Patient educated on importance of hip and quad strengthening as well as working on her foot posture to help avoid increased stress on her knees.     Written Home Exercises Provided: Patient instructed to cont prior HEP. Exercises were reviewed and Felipa was able to demonstrate them prior to the end of the session.  Felipa demonstrated good  understanding of the education provided. See EMR under Patient Instructions for exercises provided during therapy sessions    ASSESSMENT     Felipa has been seen for a total of 4 visits for patellar dislocation. She has shown improvements in her range of motion and strength as well as functional improvements from subjective reports and improved squatting range and tolerance to standing. She continues to be limited in hip/glute strength limiting her functional ability for full squats without reports of instability as well as limited talocrural mobility increasing stress on her knee limiting her squatting potential as well. She would continue to benefit from further physical therapy services to address her deficits and continue to improve her functional mobility with emphasis on joint mobility, balance/proprioception training, and hip/glute strengthening. Will continue to monitor and progress as able.     Felipa Is progressing well towards her goals.   Pt prognosis is Good.     Pt will continue to benefit from skilled outpatient physical therapy to address the deficits listed in the problem list box on initial evaluation, provide pt/family education and to maximize pt's level of independence in the home and community environment.     Pt's spiritual, cultural and educational needs considered and pt agreeable to plan of care and goals.     Anticipated barriers to physical therapy: none    Goals:   Short Term Goals: 4-6 weeks (progressing, not met)  1. Patient will be independent in initial HEP to help supplement PT. - MET  2. Patient will  be able to stand for 30 min with minimal pain of </= 3/10 to show improvement in condition and to help with job duties. - MET  3. Patient will improve right knee flexion range of motion to equal to left to help with gait mechanics. - MET  4. Patient will improve single leg balance on right lower extremity to 30 sec to help with gait and ADLs.      Long Term Goals: 10-12 weeks (progressing, not met)  1. Patient will be independent in updated HEP to help supplement PT and modulate symptoms  2. Patient will improve FOTO limitation score to </= 31% limited to show improvement in condition.   3. Patient will be able to stand for 1 hour with minimal pain </= 2/10 to help with ADLs and job duties.   4. Patient will improve hip abduction strength to >/= 4+/5 to help with gait mechanics and stairs.   5. Patient will be able to perform 5 sit to stands with minimal to no pain to help with job duties.        PLAN   Plan of care Certification: 1/11/2022 to 3/30/2022.    Patient to be seen 1 time a week for another 6 weeks to continue to improve overall functional mobility. Continue with emphasis on joint mobility, hip/glute strengthening, foot intrinsic strengthening, and balance/proprioception training. Will continue monitor and progress as able.     Deanna Lisa, PT

## 2022-02-23 DIAGNOSIS — D84.9 IMMUNOSUPPRESSED STATUS: ICD-10-CM

## 2022-05-04 ENCOUNTER — OFFICE VISIT (OUTPATIENT)
Dept: FAMILY MEDICINE | Facility: CLINIC | Age: 27
End: 2022-05-04
Payer: MEDICAID

## 2022-05-04 VITALS
HEART RATE: 80 BPM | SYSTOLIC BLOOD PRESSURE: 104 MMHG | OXYGEN SATURATION: 100 % | WEIGHT: 209 LBS | RESPIRATION RATE: 14 BRPM | HEIGHT: 64 IN | DIASTOLIC BLOOD PRESSURE: 72 MMHG | BODY MASS INDEX: 35.68 KG/M2 | TEMPERATURE: 99 F

## 2022-05-04 DIAGNOSIS — E66.9 OBESITY (BMI 35.0-39.9 WITHOUT COMORBIDITY): Primary | ICD-10-CM

## 2022-05-04 PROCEDURE — 1159F MED LIST DOCD IN RCRD: CPT | Mod: CPTII,,, | Performed by: FAMILY MEDICINE

## 2022-05-04 PROCEDURE — 99214 OFFICE O/P EST MOD 30 MIN: CPT | Performed by: FAMILY MEDICINE

## 2022-05-04 PROCEDURE — 99213 PR OFFICE/OUTPT VISIT, EST, LEVL III, 20-29 MIN: ICD-10-PCS | Mod: S$PBB,,, | Performed by: FAMILY MEDICINE

## 2022-05-04 PROCEDURE — 99213 OFFICE O/P EST LOW 20 MIN: CPT | Mod: S$PBB,,, | Performed by: FAMILY MEDICINE

## 2022-05-04 PROCEDURE — 3008F PR BODY MASS INDEX (BMI) DOCUMENTED: ICD-10-PCS | Mod: CPTII,,, | Performed by: FAMILY MEDICINE

## 2022-05-04 PROCEDURE — 1159F PR MEDICATION LIST DOCUMENTED IN MEDICAL RECORD: ICD-10-PCS | Mod: CPTII,,, | Performed by: FAMILY MEDICINE

## 2022-05-04 PROCEDURE — 3008F BODY MASS INDEX DOCD: CPT | Mod: CPTII,,, | Performed by: FAMILY MEDICINE

## 2022-05-04 RX ORDER — BUPROPION HYDROCHLORIDE 150 MG/1
150 TABLET, EXTENDED RELEASE ORAL 2 TIMES DAILY
Qty: 60 TABLET | Refills: 11 | Status: SHIPPED | OUTPATIENT
Start: 2022-05-04 | End: 2024-03-20

## 2022-05-04 NOTE — PROGRESS NOTES
Subjective:       Patient ID: Albina Beckham is a 26 y.o. female.    Chief Complaint: joint pain and Follow-up (Would like to discuss a few things)      Here for scheduled appointment follow-up on joint pain she says it comes and goes mostly her knees she takes medicine sporadically.  Needs to see about weight loss.  Has a 2-year-old and full-time job and cannot have time to work out.  Wt Readings from Last 5 Encounters:  05/04/22 : 94.8 kg (209 lb)  01/10/22 : 94.8 kg (209 lb)  01/04/22 : 94.8 kg (209 lb)  11/19/21 : 92.7 kg (204 lb 4.8 oz)  11/18/21 : 81.6 kg (180 lb)  Works out in am .  Watches portions, cut down carbs.  Lab Results       Component                Value               Date                       WBC                      3.31 (L)            08/12/2021                 HGB                      13.7                08/12/2021                 HCT                      40.1                08/12/2021                 PLT                      342                 08/12/2021                 CHOL                     169                 08/12/2021                 TRIG                     77                  08/12/2021                 HDL                      52                  08/12/2021                 ALT                      15                  08/12/2021                 AST                      17                  08/12/2021                 NA                       132 (L)             08/12/2021                 K                        4.1                 08/12/2021                 CL                       106                 08/12/2021                 CREATININE               0.6                 08/12/2021                 BUN                      7                   08/12/2021                 CO2                      20 (L)              08/12/2021                 TSH                      0.911               10/08/2019                 INR                      1.0                 08/08/2019                   Allergies and Medications:   Review of patient's allergies indicates:   Allergen Reactions    Ciprofloxacin Anaphylaxis    Tamiflu [oseltamivir]      Current Outpatient Medications   Medication Sig Dispense Refill    AUROVELA 1.5/30, 21, 1.5-30 mg-mcg Tab TAKE 1 TABLET BY MOUTH EVERY DAY. DO NOT. STOP TO HAVE PERIOD. TAKE CONTINUOUSLY      prenatal vit no.130-iron-folic (PRENATAL VITAMIN) 27 mg iron- 800 mcg Tab Take 1 tablet by mouth once daily. 30 tablet 11    buPROPion (WELLBUTRIN SR) 150 MG TBSR 12 hr tablet Take 1 tablet (150 mg total) by mouth 2 (two) times daily. 60 tablet 11    EPINEPHrine (EPIPEN 2-KIMANI) 0.3 mg/0.3 mL AtIn Inject Epipen as directed. (Patient not taking: Reported on 5/4/2022) 2 each 2     No current facility-administered medications for this visit.       Family History:   Family History   Problem Relation Age of Onset    Arthritis Father     Hypertension Father     Arthritis Mother     Diabetes Maternal Aunt     Diabetes Maternal Uncle     Diabetes Maternal Grandfather     Lupus Maternal Aunt     Melanoma Neg Hx     Psoriasis Neg Hx     Eczema Neg Hx        Social History:   Social History     Socioeconomic History    Marital status: Single    Number of children: 1   Occupational History    Occupation: bank teller     Comment: [employer]   Tobacco Use    Smoking status: Never Smoker    Smokeless tobacco: Never Used   Substance and Sexual Activity    Alcohol use: Not Currently    Drug use: No    Sexual activity: Yes     Partners: Male       Review of Systems    Objective:     Vitals:    05/04/22 1454   BP: 104/72   Pulse: 80   Resp: 14   Temp: 98.9 °F (37.2 °C)        Physical Exam    Assessment:       1. Obesity (BMI 35.0-39.9 without comorbidity)        Plan:       Albina was seen today for joint pain and follow-up.    Diagnoses and all orders for this visit:    Obesity (BMI 35.0-39.9 without comorbidity)  -     TSH; Future  -     T4; Future  -      Vitamin D; Future  -     Comprehensive Metabolic Panel; Future  -     buPROPion (WELLBUTRIN SR) 150 MG TBSR 12 hr tablet; Take 1 tablet (150 mg total) by mouth 2 (two) times daily.  -     Ambulatory referral/consult to Nutrition Services; Future         No follow-ups on file.

## 2022-05-05 DIAGNOSIS — M25.562 ACUTE PAIN OF BOTH KNEES: Primary | ICD-10-CM

## 2022-05-05 DIAGNOSIS — M25.561 ACUTE PAIN OF BOTH KNEES: Primary | ICD-10-CM

## 2022-05-31 ENCOUNTER — OFFICE VISIT (OUTPATIENT)
Dept: URGENT CARE | Facility: CLINIC | Age: 27
End: 2022-05-31
Payer: MEDICAID

## 2022-05-31 VITALS
OXYGEN SATURATION: 98 % | TEMPERATURE: 99 F | DIASTOLIC BLOOD PRESSURE: 78 MMHG | BODY MASS INDEX: 34.49 KG/M2 | HEART RATE: 94 BPM | SYSTOLIC BLOOD PRESSURE: 113 MMHG | HEIGHT: 64 IN | RESPIRATION RATE: 20 BRPM | WEIGHT: 202 LBS

## 2022-05-31 DIAGNOSIS — J02.9 SORE THROAT: Primary | ICD-10-CM

## 2022-05-31 DIAGNOSIS — U07.1 COVID-19 VIRUS DETECTED: ICD-10-CM

## 2022-05-31 DIAGNOSIS — R05.9 COUGH: ICD-10-CM

## 2022-05-31 LAB
CTP QC/QA: YES
FLUAV AG NPH QL: NEGATIVE
FLUBV AG NPH QL: NEGATIVE
S PYO RRNA THROAT QL PROBE: NEGATIVE
SARS-COV-2 AG RESP QL IA.RAPID: POSITIVE

## 2022-05-31 PROCEDURE — 1160F RVW MEDS BY RX/DR IN RCRD: CPT | Mod: CPTII,S$GLB,, | Performed by: NURSE PRACTITIONER

## 2022-05-31 PROCEDURE — 87804 INFLUENZA ASSAY W/OPTIC: CPT | Mod: QW,,, | Performed by: NURSE PRACTITIONER

## 2022-05-31 PROCEDURE — 1160F PR REVIEW ALL MEDS BY PRESCRIBER/CLIN PHARMACIST DOCUMENTED: ICD-10-PCS | Mod: CPTII,S$GLB,, | Performed by: NURSE PRACTITIONER

## 2022-05-31 PROCEDURE — 3008F BODY MASS INDEX DOCD: CPT | Mod: CPTII,S$GLB,, | Performed by: NURSE PRACTITIONER

## 2022-05-31 PROCEDURE — 99204 OFFICE O/P NEW MOD 45 MIN: CPT | Mod: S$GLB,,, | Performed by: NURSE PRACTITIONER

## 2022-05-31 PROCEDURE — 87811 SARS-COV-2 COVID19 W/OPTIC: CPT | Mod: QW,S$GLB,, | Performed by: NURSE PRACTITIONER

## 2022-05-31 PROCEDURE — 87804 POCT INFLUENZA A/B: ICD-10-PCS | Mod: 59,QW,, | Performed by: NURSE PRACTITIONER

## 2022-05-31 PROCEDURE — 87880 POCT RAPID STREP A: ICD-10-PCS | Mod: QW,,, | Performed by: NURSE PRACTITIONER

## 2022-05-31 PROCEDURE — 3008F PR BODY MASS INDEX (BMI) DOCUMENTED: ICD-10-PCS | Mod: CPTII,S$GLB,, | Performed by: NURSE PRACTITIONER

## 2022-05-31 PROCEDURE — 87811 SARS CORONAVIRUS 2 ANTIGEN POCT, MANUAL READ: ICD-10-PCS | Mod: QW,S$GLB,, | Performed by: NURSE PRACTITIONER

## 2022-05-31 PROCEDURE — 1159F PR MEDICATION LIST DOCUMENTED IN MEDICAL RECORD: ICD-10-PCS | Mod: CPTII,S$GLB,, | Performed by: NURSE PRACTITIONER

## 2022-05-31 PROCEDURE — 87880 STREP A ASSAY W/OPTIC: CPT | Mod: QW,,, | Performed by: NURSE PRACTITIONER

## 2022-05-31 PROCEDURE — 1159F MED LIST DOCD IN RCRD: CPT | Mod: CPTII,S$GLB,, | Performed by: NURSE PRACTITIONER

## 2022-05-31 PROCEDURE — 99204 PR OFFICE/OUTPT VISIT, NEW, LEVL IV, 45-59 MIN: ICD-10-PCS | Mod: S$GLB,,, | Performed by: NURSE PRACTITIONER

## 2022-05-31 RX ORDER — FLUTICASONE PROPIONATE 50 MCG
1 SPRAY, SUSPENSION (ML) NASAL DAILY
Qty: 18.2 ML | Refills: 0 | Status: SHIPPED | OUTPATIENT
Start: 2022-05-31 | End: 2024-01-04

## 2022-05-31 RX ORDER — ALBUTEROL SULFATE 90 UG/1
2 AEROSOL, METERED RESPIRATORY (INHALATION) EVERY 6 HOURS PRN
Qty: 18 G | Refills: 0 | Status: SHIPPED | OUTPATIENT
Start: 2022-05-31 | End: 2024-01-04

## 2022-05-31 RX ORDER — PROMETHAZINE HYDROCHLORIDE AND DEXTROMETHORPHAN HYDROBROMIDE 6.25; 15 MG/5ML; MG/5ML
5 SYRUP ORAL NIGHTLY PRN
Qty: 50 ML | Refills: 0 | Status: SHIPPED | OUTPATIENT
Start: 2022-05-31 | End: 2022-06-10

## 2022-05-31 RX ORDER — BENZONATATE 200 MG/1
200 CAPSULE ORAL 3 TIMES DAILY PRN
Qty: 30 CAPSULE | Refills: 0 | Status: SHIPPED | OUTPATIENT
Start: 2022-05-31 | End: 2022-06-10

## 2022-05-31 NOTE — LETTER
May 31, 2022      [facility]  [address]  [facility] [address]  Phone: [phone]       Patient: Albina Beckham   YOB: 1995  Date of Visit: 05/31/2022    To Whom It May Concern:    Adan Beckham  was at [facility] on 05/31/2022. The patient may return to work/school on 06/05/2022 if she is fever free for 24 hours and her symptoms are improving. She will need to wear a mask while in public 06/06/2022-06/09/2022. If you have any questions or concerns, or if I can be of further assistance, please do not hesitate to contact me.    Sincerely,    Juliana Infante NP

## 2022-05-31 NOTE — PATIENT INSTRUCTIONS
Symptomatic treatment to include:    Rest, increase fluid intake to include electrolyte replacement  Ibuprofen/Tylenol as directed for fever, sore throat, body aches  Zrytec and flonase for sinus symptoms  Phenergan cough syrup at night for cough  Tessalon perles cough pills as needed day or night  Mucinex D over the counter as directed for sinus congestion.  Coricidin HBP if you have high blood pressure.  Zofran as directed for nausea/vomiting.  Warm, salt water gargles, over the counter throat lozenges or sprays as desires.   Imodium over the counter as directed for diarrhea.  ER for difficulty breathing not relieved by rest, excessive lethargy and/or change in mental status  Albuterol inhaler (if prescribed) for chest tightness, shortness or breath, wheezing, or tight/wheezing cough especially when brought on with deep breath.  Follow CDC isolation guidelines as provided  Patient Instructions   POSITIVE COVID TEST      You have tested positive for COVID-19 today.  Please note that patients who test positive for COVID-19 are required by the CDC to undergo isolation for 5 days, then wearing a mask around others for an additional 5 days, after their symptoms first began following the new updated guidelines of 12/27/2021. This isolation starts from the day you first developed symptoms, not the day of your positive test. For example, if your symptoms began on a Monday but tested positive on the following Wednesday, your 5-day isolation begins from that Monday, not the Wednesday you tested positive.  However, if you are asymptomatic (a person who does not have any symptoms) and COVID-19 positive, your 5-day isolation begins on the day you tested positive, regardless of exposure date.  Also, per the CDC guidelines, once your 5 days have passed, symptoms have resolved or are improving, and you have not had fever greater than 100.4F in the last 24 hours without taking any fever reducers such as Tylenol (Acetaminophen) or  Motrin (Ibuprofen), you may return to your normal activities including social distancing, wearing masks, and frequent handwashing - YOU DO NOT NEED ANOTHER TEST IN ORDER TO END YOUR QUARANTINE.

## 2022-05-31 NOTE — PROGRESS NOTES
"Subjective:       Patient ID: Albina Beckham is a 26 y.o. female.    Vitals:  height is 5' 4" (1.626 m) and weight is 91.6 kg (202 lb). Her temperature is 99.1 °F (37.3 °C). Her blood pressure is 113/78 and her pulse is 94. Her respiration is 20 and oxygen saturation is 98%.     Chief Complaint: Fever    Sunday X 102.7 fever, headache, nasal congestion and runny nose, sore throat, cough. meds tried- tylenol.       Constitution: Positive for chills and fever.   HENT: Positive for congestion.    Neurological: Positive for headaches.       Objective:      Physical Exam   Constitutional: She is oriented to person, place, and time.   HENT:   Head: Normocephalic and atraumatic.   Ears:   Right Ear: Tympanic membrane, external ear and ear canal normal.   Left Ear: Tympanic membrane, external ear and ear canal normal.   Nose: Congestion present.   Mouth/Throat: Posterior oropharyngeal erythema present.   Eyes: Conjunctivae are normal.   Neck: Neck supple.   Cardiovascular: Normal rate, regular rhythm, normal heart sounds and normal pulses.   Pulmonary/Chest: Breath sounds normal.   Abdominal: Normal appearance. Soft.   Musculoskeletal: Normal range of motion.         General: Normal range of motion.   Neurological: She is alert and oriented to person, place, and time.   Skin: Skin is warm and dry. Capillary refill takes 2 to 3 seconds.   Psychiatric: Her behavior is normal. Mood normal.   Nursing note and vitals reviewed.        Assessment:       1. Sore throat    2. Cough    3. COVID-19 virus detected      Covid antigen: Positive   Influenza A/B: Negative   Strep A: Negative    Plan:         Sore throat  -     POCT rapid strep A    Cough  -     SARS Coronavirus 2 Antigen, POCT Manual Read  -     POCT Influenza A/B    COVID-19 virus detected  -     fluticasone propionate (FLONASE) 50 mcg/actuation nasal spray; 1 spray (50 mcg total) by Each Nostril route once daily.  Dispense: 18.2 mL; Refill: 0  -     albuterol " (VENTOLIN HFA) 90 mcg/actuation inhaler; Inhale 2 puffs into the lungs every 6 (six) hours as needed for Wheezing. Rescue  Dispense: 18 g; Refill: 0  -     benzonatate (TESSALON) 200 MG capsule; Take 1 capsule (200 mg total) by mouth 3 (three) times daily as needed for Cough.  Dispense: 30 capsule; Refill: 0  -     promethazine-dextromethorphan (PROMETHAZINE-DM) 6.25-15 mg/5 mL Syrp; Take 5 mLs by mouth nightly as needed (cough).  Dispense: 50 mL; Refill: 0    Symptomatic treatment to include:    Rest, increase fluid intake to include electrolyte replacement  Ibuprofen/Tylenol as directed for fever, sore throat, body aches  Zrytec and flonase for sinus symptoms  Phenergan cough syrup at night for cough  Tessalon perles cough pills as needed day or night  Mucinex D over the counter as directed for sinus congestion.  Coricidin HBP if you have high blood pressure.  Zofran as directed for nausea/vomiting.  Warm, salt water gargles, over the counter throat lozenges or sprays as desires.   Imodium over the counter as directed for diarrhea.  ER for difficulty breathing not relieved by rest, excessive lethargy and/or change in mental status  Albuterol inhaler (if prescribed) for chest tightness, shortness or breath, wheezing, or tight/wheezing cough especially when brought on with deep breath.  Follow CDC isolation guidelines as provided  Patient Instructions   POSITIVE COVID TEST      You have tested positive for COVID-19 today.  Please note that patients who test positive for COVID-19 are required by the CDC to undergo isolation for 5 days, then wearing a mask around others for an additional 5 days, after their symptoms first began following the new updated guidelines of 12/27/2021. This isolation starts from the day you first developed symptoms, not the day of your positive test. For example, if your symptoms began on a Monday but tested positive on the following Wednesday, your 5-day isolation begins from that Monday,  not the Wednesday you tested positive.  However, if you are asymptomatic (a person who does not have any symptoms) and COVID-19 positive, your 5-day isolation begins on the day you tested positive, regardless of exposure date.  Also, per the CDC guidelines, once your 5 days have passed, symptoms have resolved or are improving, and you have not had fever greater than 100.4F in the last 24 hours without taking any fever reducers such as Tylenol (Acetaminophen) or Motrin (Ibuprofen), you may return to your normal activities including social distancing, wearing masks, and frequent handwashing - YOU DO NOT NEED ANOTHER TEST IN ORDER TO END YOUR QUARANTINE.

## 2022-09-21 ENCOUNTER — OFFICE VISIT (OUTPATIENT)
Dept: FAMILY MEDICINE | Facility: CLINIC | Age: 27
End: 2022-09-21
Payer: MEDICAID

## 2022-09-21 VITALS
HEART RATE: 85 BPM | WEIGHT: 200.19 LBS | HEIGHT: 64 IN | BODY MASS INDEX: 34.18 KG/M2 | RESPIRATION RATE: 18 BRPM | DIASTOLIC BLOOD PRESSURE: 72 MMHG | TEMPERATURE: 99 F | SYSTOLIC BLOOD PRESSURE: 124 MMHG | OXYGEN SATURATION: 98 %

## 2022-09-21 DIAGNOSIS — R05.3 POST-COVID CHRONIC COUGH: ICD-10-CM

## 2022-09-21 DIAGNOSIS — E66.9 OBESITY (BMI 30.0-34.9): Primary | ICD-10-CM

## 2022-09-21 DIAGNOSIS — U09.9 POST-COVID CHRONIC COUGH: ICD-10-CM

## 2022-09-21 DIAGNOSIS — F41.1 GENERALIZED ANXIETY DISORDER: ICD-10-CM

## 2022-09-21 PROCEDURE — 1159F MED LIST DOCD IN RCRD: CPT | Mod: CPTII,,, | Performed by: FAMILY MEDICINE

## 2022-09-21 PROCEDURE — 99214 PR OFFICE/OUTPT VISIT, EST, LEVL IV, 30-39 MIN: ICD-10-PCS | Mod: S$PBB,,, | Performed by: FAMILY MEDICINE

## 2022-09-21 PROCEDURE — 3008F BODY MASS INDEX DOCD: CPT | Mod: CPTII,,, | Performed by: FAMILY MEDICINE

## 2022-09-21 PROCEDURE — 99214 OFFICE O/P EST MOD 30 MIN: CPT | Mod: S$PBB,,, | Performed by: FAMILY MEDICINE

## 2022-09-21 PROCEDURE — 1159F PR MEDICATION LIST DOCUMENTED IN MEDICAL RECORD: ICD-10-PCS | Mod: CPTII,,, | Performed by: FAMILY MEDICINE

## 2022-09-21 PROCEDURE — 99214 OFFICE O/P EST MOD 30 MIN: CPT | Performed by: FAMILY MEDICINE

## 2022-09-21 PROCEDURE — 3008F PR BODY MASS INDEX (BMI) DOCUMENTED: ICD-10-PCS | Mod: CPTII,,, | Performed by: FAMILY MEDICINE

## 2022-09-21 RX ORDER — PROMETHAZINE HYDROCHLORIDE AND DEXTROMETHORPHAN HYDROBROMIDE 6.25; 15 MG/5ML; MG/5ML
10 SYRUP ORAL NIGHTLY
Qty: 180 ML | Refills: 2 | Status: SHIPPED | OUTPATIENT
Start: 2022-09-21 | End: 2022-10-01

## 2022-09-21 NOTE — PATIENT INSTRUCTIONS
We understand that controlling diabetes can feel overwhelming at times. We are here to offer the tools and support to help you manage your diabetes and meet your health goals. Please reference the meal planning guide below.   My fitness Pal  Diabetic Meal Planning    About this topic  Healthy eating is an important part of keeping your diabetes in control. A balanced diet along with your diabetic drugs will help you control your blood sugar. The right portions of healthy foods may help keep your sugar within your goal range. It may also help to lower or maintain your weight, manage cholesterol, the amount of fat in your blood, and blood pressure.      Image(s)    What will the results be?  Healthy eating may help you lower your blood sugar and keep it in a safe range. Keeping your blood sugar in a safe range may lower your chances for long term problems from your diabetes. You may be less likely to have damage to your kidneys, heart, eyes, or nerves.    What changes to diet are needed?  Healthy eating means:  Eating a range of foods from all food groups.  Eating the right size portions. Read the nutrition facts on each food you eat.  Eating meals and snacks at the same time each day. Do not skip a meal or snack. Skipping meals may cause your blood sugar to go too low, especially if you are on drugs for your diabetes. Skipping meals can also cause you to eat too much at the next meal.  Talk to your diabetes educator about making a personal meal plan for you. They can also help you eat the foods you like by modifying them to be healthier.    Who should use this diet?  This diet is helpful to people with high blood sugar or diabetes.    What foods are good to eat?  It is important to make a healthy meal. Eat a variety of different foods in the right portion.  Fill half of your plate with non-starchy vegetables. Non-starchy vegetables include: Broccoli, green beans, carrots, greens (mildred, kale, mustard, turnip),  onions, tomatoes, asparagus, beets, cauliflower, celery, and cucumber. Non-starchy vegetables are high in fiber and low in carbohydrates. These will help keep you full for longer without raising your blood sugar.  Fill 1/4 of your plate with carbohydrates. Try to choose whole grain options. Whole-grain products have more fiber which will make you feel full. Carbohydrates include: Bread, rice, pasta, and starchy vegetables. Starchy vegetables include beans, potatoes, acorn squash, butternut squash, corn, and peas.  Fill 1/4 of your plate with protein. Choose low-fat cuts of meat like boneless, skinless chicken breast; pork loin; 90% lean beef; lean and skinless turkey meat; and fresh fish (not fried) such as tuna, salmon, or mackerel.  Add a low-fat or non-fat dairy product like 8 ounces (240 mL) of 1% or skim milk or 6 ounces (180 mL) of low-fat yogurt. Eat the recommended serving. Milk and yogurt have carbohydrates which raise your blood sugar.  Add a small piece of fruit or 1/2 cup (120 grams) of frozen or canned fruit. Choose canned fruits in juice or water. Fruits include apples, bananas, peaches, pears, pineapple, plums, and oranges. Eat the recommended serving. Fruit has carbohydrates which can raise your blood sugar.  Include healthy fats in your meal like olive oil, canola oil, avocado, and nuts.  Other good foods to include in your diet are:  Foods high in fiber. Adding fiber to your meals may help control your blood sugar and cholesterol levels. It may also help with weight loss and prevent belly problems. If you are younger than 50, it is recommended to get 25 to 38 grams per day. If you are older than 50, it is recommended to get 21 to 30 grams per day. Good sources of fiber include:  Nuts and seeds  Beans, peas, and other legumes  Whole-grain products  Fruits  Vegetables  Smart snacks in the right portion. Do not go too long in between meals. Ask your dietitian when you should have a snack in between  your meals. Snack on things like:  Nuts  Vegetables and low-fat dressing  Light popcorn  Low-fat cheese and crackers  1/2 sandwich    What foods should be limited or avoided?  You may need to limit the amount of some foods you eat and how often you eat them. Foods that should be limited include:  High fat or processed foods like:  Neal  Sausage  Hot dogs  Whole-fat dairy products  Potato chips  Foods high in sodium like:  Canned soups  Soy sauce and some salad dressings  Cured meats  Salted snack foods like pretzels  Olives  Fats and oils like:  Margarine  Salad dressing  Gravy  Lard or shortening  Foods high in sugar like:  Candy  Cookies  Cake  Ice cream  Table sugar  Soda  Juice drinks  Starches that are not whole grain like:  White rice  French fries  White pasta  White bread  Sugary cereals  Baked goods, pastries, croissants  Beer, wine, and mixed drinks (alcohol). Drink alcohol in moderation (1 drink per day or less for adult women and 2 drinks per day or less for adult men). Drinking alcohol can cause fluctuations in your blood sugar and interfere with how your diabetic drugs work. Talk to your doctor about how you can safely include alcohol into your diet.    What can be done to prevent this health problem?  Some people have a higher chance of developing diabetes than others. This is a life-long problem. You can still lead a normal life. Diabetes can be managed through diet, exercise, and drugs. It is important to have support from your family and friends to help you with your goals.    When do I need to call the doctor?  Blood sugar level is above 240 mg/dL for more than a day  Blood sugar level drops to less than 40 and does not respond to 15 grams of simple carbohydrate, like 4 glucose tablets or 1/2 cup (120 mL) of fruit juice  Trouble breathing  Very sleepy and trouble concentrating  Feeling very thirsty  Needing to urinate (pee) more than normal  Throw up more than once or have many loose stools  You  are so sick you cannot eat or drink  Fever over 101°F (38°C)  Questions about your diet plan  You are not feeling better in 2 to 3 days or you are feeling worse    Helpful tips  Plan ahead. Plan your meals and grocery list before going to the store. This will help you to choose foods that are good for you.  Pack a lunch. Take healthy meals and snacks with you to work.  Keep a food journal to help keep you on track. Take note of foods that keep your blood sugar in goal range. Also note foods and meals that raise or lower your blood sugar. There are apps for your phone that can help with this.  Visit a restaurant's website before eating out. You can see the menu options and nutritional facts. This way, you can see which items best fit into your diet plan. Call ahead if you have questions.    Disclaimer.This generalized information is a limited summary of diagnosis, treatment, and/or medication information. It is not meant to be comprehensive and should be used as a tool to help the user understand and/or assess potential diagnostic and treatment options. It does NOT include all information about conditions, treatments, medications, side effects, or risks that may apply to a specific patient. It is not intended to be medical advice or a substitute for the medical advice, diagnosis, or treatment of a health care provider based on the health care provider's examination and assessment of a patient's specific and unique circumstances. Patients must speak with a health care provider for complete information about their health, medical questions, and treatment options, including any risks or benefits regarding use of medications. This information does not endorse any treatments or medications as safe, effective, or approved for treating a specific patient. [employer] and its affiliates disclaim any warranty or liability relating to this information or the use thereof. The use of this information is governed by the Terms  of Use, available at Terms of Use. ©2022 [employer] and its affiliates and/or licensors. All rights reserved.

## 2022-09-21 NOTE — PROGRESS NOTES
Subjective:       Patient ID: Albina Beckham is a 27 y.o. female.    Chief Complaint: Anxiety (F/u)      Is here for follow-up on anxiety and weight loss was placed on Wellbutrin last visit, helping with weight loss, but lost 13 lb.  Wt Readings from Last 3 Encounters:  09/21/22 : 90.8 kg (200 lb 3.2 oz)  05/31/22 : 91.6 kg (202 lb)  05/04/22 : 94.8 kg (209 lb)  Logging in all foods.        Anxiety          Allergies and Medications:   Review of patient's allergies indicates:   Allergen Reactions    Ciprofloxacin Anaphylaxis    Tamiflu [oseltamivir]      Current Outpatient Medications   Medication Sig Dispense Refill    albuterol (VENTOLIN HFA) 90 mcg/actuation inhaler Inhale 2 puffs into the lungs every 6 (six) hours as needed for Wheezing. Rescue 18 g 0    AUROVELA 1.5/30, 21, 1.5-30 mg-mcg Tab TAKE 1 TABLET BY MOUTH EVERY DAY. DO NOT. STOP TO HAVE PERIOD. TAKE CONTINUOUSLY      buPROPion (WELLBUTRIN SR) 150 MG TBSR 12 hr tablet Take 1 tablet (150 mg total) by mouth 2 (two) times daily. 60 tablet 11    EPINEPHrine (EPIPEN 2-KIMANI) 0.3 mg/0.3 mL AtIn Inject Epipen as directed. 2 each 2    fluticasone propionate (FLONASE) 50 mcg/actuation nasal spray 1 spray (50 mcg total) by Each Nostril route once daily. 18.2 mL 0    promethazine-dextromethorphan (PROMETHAZINE-DM) 6.25-15 mg/5 mL Syrp Take 10 mLs by mouth every evening. for 10 days 180 mL 2     No current facility-administered medications for this visit.       Family History:   Family History   Problem Relation Age of Onset    Arthritis Father     Hypertension Father     Arthritis Mother     Diabetes Maternal Aunt     Diabetes Maternal Uncle     Diabetes Maternal Grandfather     Lupus Maternal Aunt     Melanoma Neg Hx     Psoriasis Neg Hx     Eczema Neg Hx        Social History:   Social History     Socioeconomic History    Marital status: Single    Number of children: 1   Occupational History    Occupation:      Comment: [employer]    Tobacco Use    Smoking status: Never    Smokeless tobacco: Never   Substance and Sexual Activity    Alcohol use: Not Currently    Drug use: No    Sexual activity: Yes     Partners: Male       Review of Systems    Objective:     Vitals:    09/21/22 1057   BP: 124/72   Pulse: 85   Resp: 18   Temp: 98.6 °F (37 °C)        Physical Exam  Vitals and nursing note reviewed.   Constitutional:       General: She is not in acute distress.     Appearance: She is well-developed. She is not ill-appearing, toxic-appearing or diaphoretic.   HENT:      Head: Normocephalic and atraumatic.   Eyes:      Conjunctiva/sclera: Conjunctivae normal.      Pupils: Pupils are equal, round, and reactive to light.   Neck:      Vascular: No carotid bruit.   Cardiovascular:      Rate and Rhythm: Normal rate and regular rhythm.      Heart sounds: No murmur heard.    No friction rub. No gallop.   Pulmonary:      Effort: Pulmonary effort is normal. No respiratory distress.      Breath sounds: No stridor. No wheezing, rhonchi or rales.   Chest:      Chest wall: No tenderness.   Musculoskeletal:      Cervical back: Normal range of motion and neck supple. No rigidity or tenderness.   Lymphadenopathy:      Cervical: No cervical adenopathy.   Skin:     General: Skin is dry.      Coloration: Skin is not jaundiced or pale.      Findings: No bruising, erythema, lesion or rash.   Psychiatric:         Behavior: Behavior normal.         Thought Content: Thought content normal.         Judgment: Judgment normal.       Assessment:       1. Obesity (BMI 30.0-34.9)    2. Generalized anxiety disorder    3. Post-COVID chronic cough        Plan:       Albina was seen today for anxiety.    Diagnoses and all orders for this visit:    Obesity (BMI 30.0-34.9)    Generalized anxiety disorder    Post-COVID chronic cough  -     promethazine-dextromethorphan (PROMETHAZINE-DM) 6.25-15 mg/5 mL Syrp; Take 10 mLs by mouth every evening. for 10 days  -     X-Ray Chest PA And  Lateral; Future       Follow up in about 3 months (around 12/21/2022) for follow up anxiety and weight gain.

## 2022-11-09 ENCOUNTER — HOSPITAL ENCOUNTER (OUTPATIENT)
Dept: RADIOLOGY | Facility: HOSPITAL | Age: 27
Discharge: HOME | End: 2022-11-09
Attending: FAMILY MEDICINE
Payer: MEDICAID

## 2022-11-09 DIAGNOSIS — R05.3 POST-COVID CHRONIC COUGH: ICD-10-CM

## 2022-11-09 DIAGNOSIS — U09.9 POST-COVID CHRONIC COUGH: ICD-10-CM

## 2022-11-09 PROCEDURE — 71046 X-RAY EXAM CHEST 2 VIEWS: CPT | Mod: TC

## 2022-11-10 ENCOUNTER — TELEPHONE (OUTPATIENT)
Dept: FAMILY MEDICINE | Facility: CLINIC | Age: 27
End: 2022-11-10

## 2022-11-10 NOTE — TELEPHONE ENCOUNTER
----- Message from YENNI Solo sent at 11/9/2022  9:45 AM CST -----  Please notify patient that results are normal.

## 2022-11-10 NOTE — TELEPHONE ENCOUNTER
----- Message from YENNI Solo sent at 11/10/2022  9:22 AM CST -----  Please notify patient that her results are abnormal- WBC count a little low, vit D low but not critical. Please verify patient has a follow-up appointment to review the results with PCP

## 2023-03-06 ENCOUNTER — OFFICE VISIT (OUTPATIENT)
Dept: URGENT CARE | Facility: CLINIC | Age: 28
End: 2023-03-06
Payer: MEDICAID

## 2023-03-06 VITALS
OXYGEN SATURATION: 99 % | SYSTOLIC BLOOD PRESSURE: 133 MMHG | WEIGHT: 193 LBS | DIASTOLIC BLOOD PRESSURE: 94 MMHG | HEIGHT: 64 IN | RESPIRATION RATE: 20 BRPM | BODY MASS INDEX: 32.95 KG/M2 | TEMPERATURE: 102 F | HEART RATE: 110 BPM

## 2023-03-06 DIAGNOSIS — R50.9 FEVER, UNSPECIFIED FEVER CAUSE: ICD-10-CM

## 2023-03-06 DIAGNOSIS — J02.9 SORE THROAT: ICD-10-CM

## 2023-03-06 DIAGNOSIS — B34.9 VIRAL SYNDROME: Primary | ICD-10-CM

## 2023-03-06 DIAGNOSIS — Z20.822 COVID-19 VIRUS NOT DETECTED: ICD-10-CM

## 2023-03-06 LAB
CTP QC/QA: YES
FLUAV AG NPH QL: NEGATIVE
FLUBV AG NPH QL: NEGATIVE
S PYO RRNA THROAT QL PROBE: NEGATIVE
SARS-COV-2 AG RESP QL IA.RAPID: NEGATIVE

## 2023-03-06 PROCEDURE — 87880 STREP A ASSAY W/OPTIC: CPT | Mod: QW,,, | Performed by: NURSE PRACTITIONER

## 2023-03-06 PROCEDURE — 3075F SYST BP GE 130 - 139MM HG: CPT | Mod: CPTII,S$GLB,, | Performed by: NURSE PRACTITIONER

## 2023-03-06 PROCEDURE — 1160F RVW MEDS BY RX/DR IN RCRD: CPT | Mod: CPTII,S$GLB,, | Performed by: NURSE PRACTITIONER

## 2023-03-06 PROCEDURE — 3080F PR MOST RECENT DIASTOLIC BLOOD PRESSURE >= 90 MM HG: ICD-10-PCS | Mod: CPTII,S$GLB,, | Performed by: NURSE PRACTITIONER

## 2023-03-06 PROCEDURE — 3008F BODY MASS INDEX DOCD: CPT | Mod: CPTII,S$GLB,, | Performed by: NURSE PRACTITIONER

## 2023-03-06 PROCEDURE — 87804 INFLUENZA ASSAY W/OPTIC: CPT | Mod: QW,,, | Performed by: NURSE PRACTITIONER

## 2023-03-06 PROCEDURE — 1159F PR MEDICATION LIST DOCUMENTED IN MEDICAL RECORD: ICD-10-PCS | Mod: CPTII,S$GLB,, | Performed by: NURSE PRACTITIONER

## 2023-03-06 PROCEDURE — 87811 SARS-COV-2 COVID19 W/OPTIC: CPT | Mod: QW,S$GLB,, | Performed by: NURSE PRACTITIONER

## 2023-03-06 PROCEDURE — 99204 OFFICE O/P NEW MOD 45 MIN: CPT | Mod: S$GLB,,, | Performed by: NURSE PRACTITIONER

## 2023-03-06 PROCEDURE — 1159F MED LIST DOCD IN RCRD: CPT | Mod: CPTII,S$GLB,, | Performed by: NURSE PRACTITIONER

## 2023-03-06 PROCEDURE — 87804 POCT INFLUENZA A/B: ICD-10-PCS | Mod: QW,,, | Performed by: NURSE PRACTITIONER

## 2023-03-06 PROCEDURE — 3075F PR MOST RECENT SYSTOLIC BLOOD PRESS GE 130-139MM HG: ICD-10-PCS | Mod: CPTII,S$GLB,, | Performed by: NURSE PRACTITIONER

## 2023-03-06 PROCEDURE — 99204 PR OFFICE/OUTPT VISIT, NEW, LEVL IV, 45-59 MIN: ICD-10-PCS | Mod: S$GLB,,, | Performed by: NURSE PRACTITIONER

## 2023-03-06 PROCEDURE — 87811 SARS CORONAVIRUS 2 ANTIGEN POCT, MANUAL READ: ICD-10-PCS | Mod: QW,S$GLB,, | Performed by: NURSE PRACTITIONER

## 2023-03-06 PROCEDURE — 3008F PR BODY MASS INDEX (BMI) DOCUMENTED: ICD-10-PCS | Mod: CPTII,S$GLB,, | Performed by: NURSE PRACTITIONER

## 2023-03-06 PROCEDURE — 87880 POCT RAPID STREP A: ICD-10-PCS | Mod: QW,,, | Performed by: NURSE PRACTITIONER

## 2023-03-06 PROCEDURE — 1160F PR REVIEW ALL MEDS BY PRESCRIBER/CLIN PHARMACIST DOCUMENTED: ICD-10-PCS | Mod: CPTII,S$GLB,, | Performed by: NURSE PRACTITIONER

## 2023-03-06 PROCEDURE — 3080F DIAST BP >= 90 MM HG: CPT | Mod: CPTII,S$GLB,, | Performed by: NURSE PRACTITIONER

## 2023-03-06 RX ORDER — FLUTICASONE PROPIONATE 50 MCG
2 SPRAY, SUSPENSION (ML) NASAL DAILY PRN
Qty: 15.8 ML | Refills: 0 | Status: SHIPPED | OUTPATIENT
Start: 2023-03-06 | End: 2023-03-20

## 2023-03-06 RX ORDER — CETIRIZINE HYDROCHLORIDE 10 MG/1
10 TABLET ORAL DAILY PRN
Qty: 7 TABLET | Refills: 0 | Status: SHIPPED | OUTPATIENT
Start: 2023-03-06 | End: 2023-03-13

## 2023-03-06 RX ORDER — ACETAMINOPHEN 500 MG
1000 TABLET ORAL
Status: COMPLETED | OUTPATIENT
Start: 2023-03-06 | End: 2023-03-06

## 2023-03-06 RX ORDER — GUAIFENESIN AND DEXTROMETHORPHAN HYDROBROMIDE 20; 400 MG/1; MG/1
1 TABLET ORAL EVERY 4 HOURS PRN
Qty: 30 EACH | Refills: 0 | Status: SHIPPED | OUTPATIENT
Start: 2023-03-06 | End: 2023-03-16

## 2023-03-06 RX ADMIN — Medication 1000 MG: at 11:03

## 2023-03-06 NOTE — PROGRESS NOTES
"Subjective:       Patient ID: Albina Beckham is a 27 y.o. female.    Vitals:  height is 5' 4" (1.626 m) and weight is 87.5 kg (193 lb). Her oral temperature is 101.5 °F (38.6 °C) (abnormal). Her blood pressure is 133/94 (abnormal) and her pulse is 110. Her respiration is 20 and oxygen saturation is 99%.     Chief Complaint: Sore Throat    Sore Throat   This is a new problem. The current episode started today. The problem has been gradually worsening. Associated symptoms include headaches and neck pain. Pertinent negatives include no abdominal pain, congestion, coughing, shortness of breath, stridor, trouble swallowing or vomiting. She has tried nothing for the symptoms. The treatment provided no relief.     Constitution: Positive for chills and fever.   HENT:  Positive for sore throat. Negative for congestion, sinus pressure, trouble swallowing and voice change.    Neck: Positive for neck pain. Negative for painful lymph nodes.   Cardiovascular:  Negative for chest pain, palpitations and sob on exertion.   Respiratory:  Negative for cough, shortness of breath, stridor and wheezing.    Gastrointestinal:  Negative for abdominal pain, nausea and vomiting.   Musculoskeletal:  Positive for back pain and muscle ache.   Skin:  Negative for rash.   Neurological:  Positive for headaches. Negative for dizziness, light-headedness, passing out, disorientation and altered mental status.   Hematologic/Lymphatic: Negative for swollen lymph nodes.   Psychiatric/Behavioral:  Negative for altered mental status, disorientation and confusion.      Objective:      Physical Exam   Constitutional: She is oriented to person, place, and time. She appears well-developed. She is cooperative.  Non-toxic appearance. She does not appear ill. No distress.   HENT:   Head: Normocephalic and atraumatic.   Ears:   Right Ear: Hearing and external ear normal.   Left Ear: Hearing and external ear normal.   Nose: Nose normal. No mucosal edema, " rhinorrhea, nasal deformity or congestion. No epistaxis. Right sinus exhibits no maxillary sinus tenderness and no frontal sinus tenderness. Left sinus exhibits no maxillary sinus tenderness and no frontal sinus tenderness.   Mouth/Throat: Uvula is midline and mucous membranes are normal. Mucous membranes are moist. No trismus in the jaw. Normal dentition. No uvula swelling. Posterior oropharyngeal erythema present. No oropharyngeal exudate, posterior oropharyngeal edema, tonsillar abscesses or cobblestoning. Tonsils are 1+ on the right. Tonsils are 1+ on the left. No tonsillar exudate.   Eyes: Conjunctivae and lids are normal. No scleral icterus.   Neck: Trachea normal and phonation normal. Neck supple. No edema present. No erythema present. No neck rigidity present.   Cardiovascular: Normal rate, regular rhythm, normal heart sounds and normal pulses.   Pulmonary/Chest: Effort normal and breath sounds normal. No accessory muscle usage or stridor. No respiratory distress. She has no decreased breath sounds. She has no rhonchi.   Abdominal: Normal appearance.   Musculoskeletal: Normal range of motion.         General: No deformity. Normal range of motion.   Lymphadenopathy:     She has no cervical adenopathy.   Neurological: She is alert and oriented to person, place, and time. She exhibits normal muscle tone. Coordination normal.   Skin: Skin is warm, dry, intact, not diaphoretic and not pale. Capillary refill takes 2 to 3 seconds.   Psychiatric: Her speech is normal and behavior is normal. Judgment and thought content normal.   Nursing note and vitals reviewed.      Assessment:       1. Viral syndrome    2. Sore throat    3. Fever, unspecified fever cause    4. COVID-19 virus not detected          Plan:         Viral syndrome  -     dextromethorphan-guaiFENesin  mg Tab; Take 1 tablet by mouth every 4 (four) hours as needed (cough/congestion).  Dispense: 30 each; Refill: 0  -     fluticasone propionate  (FLONASE) 50 mcg/actuation nasal spray; 2 sprays (100 mcg total) by Each Nostril route daily as needed for Rhinitis.  Dispense: 15.8 mL; Refill: 0  -     cetirizine (ZYRTEC) 10 MG tablet; Take 1 tablet (10 mg total) by mouth daily as needed for Allergies.  Dispense: 7 tablet; Refill: 0  -     benzocaine-menthoL 6-10 mg lozenge; Take 1 lozenge by mouth every 2 (two) hours as needed (Sore Throat).  Dispense: 18 tablet; Refill: 0    Sore throat  -     SARS Coronavirus 2 Antigen, POCT Manual Read  -     POCT Influenza A/B Rapid Antigen  -     POCT rapid strep A  -     acetaminophen tablet 1,000 mg  -     benzocaine-menthoL 6-10 mg lozenge; Take 1 lozenge by mouth every 2 (two) hours as needed (Sore Throat).  Dispense: 18 tablet; Refill: 0    Fever, unspecified fever cause  -     acetaminophen tablet 1,000 mg    COVID-19 virus not detected    Influenza negative  Strep negative       I have discussed the test results and physical exam findings with the patient. I suspect viral etiology. Possibly early flu. We discussed symptom monitoring, conservative care methods, medication use, and follow up orders. She verbalized understanding and agreement with the plan of care.           Increase clear fluid intake  Stop all current over the counter cough, cold, flu medicine  Tylenol/motrin otc for fever or pain-may alternate these every 4 hours as needed  Flonase nasal spray and zyrtec as needed for sinus congestion and pressure.  Take Mucinex DM as needed for chest congestion and coughing. Take mucinex with a full glass of water at each dose  Add a humidifier to your room at bedtime for respiratory comfort.  Saltwater gargles 4 x daily and benzocaine anesthetic throat lozenges for sore throat. May also add honey based cough syrup  Follow up with PCP  Go immediately to the nearest emergency room for shortness of breath, chest pain,  or other emergent concern.  Return to clinic for new, worse, or unresolving symptoms

## 2023-03-06 NOTE — LETTER
March 6, 2023      [facility]  [address]  [facility] [address]  Phone: [phone]       Patient: Albina Beckham   YOB: 1995  Date of Visit: 03/06/2023    To Whom It May Concern:    Adan Beckham  was at [facility] on 03/06/2023. The patient may return to work/school on 03/07/2023 with no restrictions. If you have any questions or concerns, or if I can be of further assistance, please do not hesitate to contact me.    Sincerely,    Cherelle Cheng MA

## 2023-03-06 NOTE — PATIENT INSTRUCTIONS
Increase clear fluid intake  Stop all current over the counter cough, cold, flu medicine  Tylenol/motrin otc for fever or pain-may alternate these every 4 hours as needed  Flonase nasal spray and zyrtec as needed for sinus congestion and pressure.  Take Mucinex DM as needed for chest congestion and coughing. Take mucinex with a full glass of water at each dose  Add a humidifier to your room at bedtime for respiratory comfort.  Saltwater gargles 4 x daily and benzocaine anesthetic throat lozenges for sore throat. May also add honey based cough syrup  Follow up with PCP  Go immediately to the nearest emergency room for shortness of breath, chest pain,  or other emergent concern.  Return to clinic for new, worse, or unresolving symptoms

## 2023-03-07 ENCOUNTER — OFFICE VISIT (OUTPATIENT)
Dept: FAMILY MEDICINE | Facility: CLINIC | Age: 28
End: 2023-03-07
Payer: MEDICAID

## 2023-03-07 ENCOUNTER — HOSPITAL ENCOUNTER (EMERGENCY)
Facility: HOSPITAL | Age: 28
Discharge: HOME | End: 2023-03-07
Attending: EMERGENCY MEDICINE
Payer: MEDICAID

## 2023-03-07 VITALS
WEIGHT: 188.81 LBS | TEMPERATURE: 99 F | SYSTOLIC BLOOD PRESSURE: 110 MMHG | OXYGEN SATURATION: 98 % | HEIGHT: 64 IN | BODY MASS INDEX: 32.23 KG/M2 | RESPIRATION RATE: 20 BRPM | DIASTOLIC BLOOD PRESSURE: 62 MMHG | HEART RATE: 108 BPM

## 2023-03-07 VITALS
RESPIRATION RATE: 16 BRPM | OXYGEN SATURATION: 97 % | HEIGHT: 64 IN | HEART RATE: 84 BPM | TEMPERATURE: 98 F | BODY MASS INDEX: 32.1 KG/M2 | WEIGHT: 188 LBS | DIASTOLIC BLOOD PRESSURE: 97 MMHG | SYSTOLIC BLOOD PRESSURE: 118 MMHG

## 2023-03-07 DIAGNOSIS — J02.9 SORE THROAT: Primary | ICD-10-CM

## 2023-03-07 DIAGNOSIS — R05.1 ACUTE COUGH: ICD-10-CM

## 2023-03-07 DIAGNOSIS — R11.0 NAUSEA: ICD-10-CM

## 2023-03-07 DIAGNOSIS — R50.9 FEVER, UNSPECIFIED FEVER CAUSE: ICD-10-CM

## 2023-03-07 DIAGNOSIS — B34.9 ACUTE VIRAL SYNDROME: ICD-10-CM

## 2023-03-07 LAB
ALBUMIN SERPL BCP-MCNC: 4 G/DL (ref 3.5–5.2)
ALP SERPL-CCNC: 57 U/L (ref 55–135)
ALT SERPL W/O P-5'-P-CCNC: 13 U/L (ref 10–44)
ANION GAP SERPL CALC-SCNC: 6 MMOL/L (ref 8–16)
AST SERPL-CCNC: 23 U/L (ref 10–40)
BACTERIA #/AREA URNS HPF: NEGATIVE /HPF
BASOPHILS # BLD AUTO: 0.04 K/UL (ref 0–0.2)
BASOPHILS NFR BLD: 0.6 % (ref 0–1.9)
BILIRUB SERPL-MCNC: 0.5 MG/DL (ref 0.1–1)
BILIRUB UR QL STRIP: NEGATIVE
BUN SERPL-MCNC: 8 MG/DL (ref 6–20)
CALCIUM SERPL-MCNC: 9.2 MG/DL (ref 8.7–10.5)
CHLORIDE SERPL-SCNC: 104 MMOL/L (ref 95–110)
CLARITY UR: CLEAR
CO2 SERPL-SCNC: 27 MMOL/L (ref 23–29)
COLOR UR: YELLOW
CREAT SERPL-MCNC: 0.7 MG/DL (ref 0.5–1.4)
CTP QC/QA: YES
DIFFERENTIAL METHOD: NORMAL
EOSINOPHIL # BLD AUTO: 0.1 K/UL (ref 0–0.5)
EOSINOPHIL NFR BLD: 1.1 % (ref 0–8)
ERYTHROCYTE [DISTWIDTH] IN BLOOD BY AUTOMATED COUNT: 12.6 % (ref 11.5–14.5)
EST. GFR  (NO RACE VARIABLE): >60 ML/MIN/1.73 M^2
GLUCOSE SERPL-MCNC: 106 MG/DL (ref 70–110)
GLUCOSE UR QL STRIP: NEGATIVE
GROUP A STREP, MOLECULAR: NEGATIVE
HCT VFR BLD AUTO: 42.6 % (ref 37–48.5)
HETEROPH AB SERPL QL IA: NEGATIVE
HGB BLD-MCNC: 14.1 G/DL (ref 12–16)
HGB UR QL STRIP: ABNORMAL
HYALINE CASTS #/AREA URNS LPF: 15 /LPF
IMM GRANULOCYTES # BLD AUTO: 0.02 K/UL (ref 0–0.04)
IMM GRANULOCYTES NFR BLD AUTO: 0.3 % (ref 0–0.5)
INFLUENZA A, MOLECULAR: NEGATIVE
INFLUENZA B, MOLECULAR: NEGATIVE
KETONES UR QL STRIP: ABNORMAL
LEUKOCYTE ESTERASE UR QL STRIP: ABNORMAL
LYMPHOCYTES # BLD AUTO: 2.5 K/UL (ref 1–4.8)
LYMPHOCYTES NFR BLD: 38.1 % (ref 18–48)
MCH RBC QN AUTO: 30.1 PG (ref 27–31)
MCHC RBC AUTO-ENTMCNC: 33.1 G/DL (ref 32–36)
MCV RBC AUTO: 91 FL (ref 82–98)
MICROSCOPIC COMMENT: ABNORMAL
MOLECULAR STREP A: NEGATIVE
MONOCYTES # BLD AUTO: 0.8 K/UL (ref 0.3–1)
MONOCYTES NFR BLD: 11.6 % (ref 4–15)
NEUTROPHILS # BLD AUTO: 3.2 K/UL (ref 1.8–7.7)
NEUTROPHILS NFR BLD: 48.3 % (ref 38–73)
NITRITE UR QL STRIP: NEGATIVE
NRBC BLD-RTO: 0 /100 WBC
PH UR STRIP: 6 [PH] (ref 5–8)
PLATELET # BLD AUTO: 316 K/UL (ref 150–450)
PMV BLD AUTO: 9.3 FL (ref 9.2–12.9)
POC MOLECULAR INFLUENZA A AGN: NEGATIVE
POC MOLECULAR INFLUENZA B AGN: NEGATIVE
POTASSIUM SERPL-SCNC: 4 MMOL/L (ref 3.5–5.1)
PROT SERPL-MCNC: 8.7 G/DL (ref 6–8.4)
PROT UR QL STRIP: ABNORMAL
RBC # BLD AUTO: 4.69 M/UL (ref 4–5.4)
RBC #/AREA URNS HPF: 8 /HPF (ref 0–4)
SARS-COV-2 RDRP RESP QL NAA+PROBE: NEGATIVE
SARS-COV-2 RDRP RESP QL NAA+PROBE: NEGATIVE
SODIUM SERPL-SCNC: 137 MMOL/L (ref 136–145)
SP GR UR STRIP: 1.03 (ref 1–1.03)
SPECIMEN SOURCE: NORMAL
SQUAMOUS #/AREA URNS HPF: 7 /HPF
URN SPEC COLLECT METH UR: ABNORMAL
UROBILINOGEN UR STRIP-ACNC: ABNORMAL EU/DL
WBC # BLD AUTO: 6.54 K/UL (ref 3.9–12.7)
WBC #/AREA URNS HPF: 11 /HPF (ref 0–5)

## 2023-03-07 PROCEDURE — 3078F PR MOST RECENT DIASTOLIC BLOOD PRESSURE < 80 MM HG: ICD-10-PCS | Mod: CPTII,,, | Performed by: NURSE PRACTITIONER

## 2023-03-07 PROCEDURE — 87651 STREP A DNA AMP PROBE: CPT | Mod: PBBFAC | Performed by: NURSE PRACTITIONER

## 2023-03-07 PROCEDURE — 85025 COMPLETE CBC W/AUTO DIFF WBC: CPT | Performed by: NURSE PRACTITIONER

## 2023-03-07 PROCEDURE — 3008F BODY MASS INDEX DOCD: CPT | Mod: CPTII,,, | Performed by: NURSE PRACTITIONER

## 2023-03-07 PROCEDURE — 1159F MED LIST DOCD IN RCRD: CPT | Mod: CPTII,,, | Performed by: NURSE PRACTITIONER

## 2023-03-07 PROCEDURE — 1160F PR REVIEW ALL MEDS BY PRESCRIBER/CLIN PHARMACIST DOCUMENTED: ICD-10-PCS | Mod: CPTII,,, | Performed by: NURSE PRACTITIONER

## 2023-03-07 PROCEDURE — 87502 INFLUENZA DNA AMP PROBE: CPT | Performed by: NURSE PRACTITIONER

## 2023-03-07 PROCEDURE — 80053 COMPREHEN METABOLIC PANEL: CPT | Performed by: NURSE PRACTITIONER

## 2023-03-07 PROCEDURE — 99214 PR OFFICE/OUTPT VISIT, EST, LEVL IV, 30-39 MIN: ICD-10-PCS | Mod: S$PBB,,, | Performed by: NURSE PRACTITIONER

## 2023-03-07 PROCEDURE — 1159F PR MEDICATION LIST DOCUMENTED IN MEDICAL RECORD: ICD-10-PCS | Mod: CPTII,,, | Performed by: NURSE PRACTITIONER

## 2023-03-07 PROCEDURE — U0002 COVID-19 LAB TEST NON-CDC: HCPCS | Performed by: NURSE PRACTITIONER

## 2023-03-07 PROCEDURE — 87086 URINE CULTURE/COLONY COUNT: CPT | Performed by: NURSE PRACTITIONER

## 2023-03-07 PROCEDURE — 3074F PR MOST RECENT SYSTOLIC BLOOD PRESSURE < 130 MM HG: ICD-10-PCS | Mod: CPTII,,, | Performed by: NURSE PRACTITIONER

## 2023-03-07 PROCEDURE — 3074F SYST BP LT 130 MM HG: CPT | Mod: CPTII,,, | Performed by: NURSE PRACTITIONER

## 2023-03-07 PROCEDURE — 3078F DIAST BP <80 MM HG: CPT | Mod: CPTII,,, | Performed by: NURSE PRACTITIONER

## 2023-03-07 PROCEDURE — 87502 INFLUENZA DNA AMP PROBE: CPT | Mod: PBBFAC | Performed by: NURSE PRACTITIONER

## 2023-03-07 PROCEDURE — 99283 EMERGENCY DEPT VISIT LOW MDM: CPT

## 2023-03-07 PROCEDURE — 87651 STREP A DNA AMP PROBE: CPT | Performed by: NURSE PRACTITIONER

## 2023-03-07 PROCEDURE — 87635 SARS-COV-2 COVID-19 AMP PRB: CPT | Mod: PBBFAC | Performed by: NURSE PRACTITIONER

## 2023-03-07 PROCEDURE — 1160F RVW MEDS BY RX/DR IN RCRD: CPT | Mod: CPTII,,, | Performed by: NURSE PRACTITIONER

## 2023-03-07 PROCEDURE — 99214 OFFICE O/P EST MOD 30 MIN: CPT | Mod: S$PBB,,, | Performed by: NURSE PRACTITIONER

## 2023-03-07 PROCEDURE — 99215 OFFICE O/P EST HI 40 MIN: CPT | Performed by: NURSE PRACTITIONER

## 2023-03-07 PROCEDURE — 3008F PR BODY MASS INDEX (BMI) DOCUMENTED: ICD-10-PCS | Mod: CPTII,,, | Performed by: NURSE PRACTITIONER

## 2023-03-07 PROCEDURE — 81001 URINALYSIS AUTO W/SCOPE: CPT | Performed by: NURSE PRACTITIONER

## 2023-03-07 PROCEDURE — 86308 HETEROPHILE ANTIBODY SCREEN: CPT | Performed by: STUDENT IN AN ORGANIZED HEALTH CARE EDUCATION/TRAINING PROGRAM

## 2023-03-07 RX ORDER — ONDANSETRON 8 MG/1
8 TABLET, ORALLY DISINTEGRATING ORAL EVERY 8 HOURS PRN
Qty: 10 TABLET | Refills: 0 | Status: SHIPPED | OUTPATIENT
Start: 2023-03-07 | End: 2024-01-04

## 2023-03-07 NOTE — PROGRESS NOTES
SUBJECTIVE:      Patient ID: Albina Beckham is a 27 y.o. female.    Chief Complaint: Sore Throat, Headache, Back Pain, and Neck Pain    Felipa presents to the clinic with c/o fever, nausea, sore throat and body ache/ neck pain. Pt reports that symptoms started yesterday. She went to the urgent care and was dx with viral syndrome. Was tested and negative. PT reports she had a fever at the , was given tylenol and was effective. Last her fever was 101.6, tylenol was effective again. She has been nauseous with decrease appetite. Denies emesis or diarrhea. Pt reports no oral intake since yesterday or starting any medications from urgent care, besides the tylenol. Reports neck pain started yesterday on the sides and today her neck just feels tight and she has body aches. Denies SOB, cough, or inability to swallow.     Fever   This is a new problem. The current episode started yesterday. The problem occurs intermittently. The problem has been waxing and waning. The maximum temperature noted was 101 to 101.9 F. Associated symptoms include muscle aches, nausea and a sore throat. Pertinent negatives include no abdominal pain, chest pain, congestion, coughing, diarrhea, ear pain, headaches, rash, urinary pain, vomiting or wheezing. She has tried acetaminophen for the symptoms. The treatment provided moderate relief.   Risk factors: sick contacts    Risk factors: no recent sickness    Nausea  This is a new problem. The current episode started today. The problem occurs constantly. The problem has been unchanged. Associated symptoms include anorexia, chills, a fever, myalgias, nausea and a sore throat. Pertinent negatives include no abdominal pain, arthralgias, chest pain, congestion, coughing, fatigue, headaches, joint swelling, rash, swollen glands or vomiting. Nothing aggravates the symptoms. She has tried nothing for the symptoms.     Family History   Problem Relation Age of Onset    Arthritis Father      Hypertension Father     Arthritis Mother     Diabetes Maternal Aunt     Diabetes Maternal Uncle     Diabetes Maternal Grandfather     Lupus Maternal Aunt     Melanoma Neg Hx     Psoriasis Neg Hx     Eczema Neg Hx       Social History     Socioeconomic History    Marital status: Single    Number of children: 1   Occupational History    Occupation:      Comment: [employer]   Tobacco Use    Smoking status: Never    Smokeless tobacco: Never   Substance and Sexual Activity    Alcohol use: Not Currently    Drug use: No    Sexual activity: Yes     Partners: Male     Current Outpatient Medications   Medication Sig Dispense Refill    albuterol (VENTOLIN HFA) 90 mcg/actuation inhaler Inhale 2 puffs into the lungs every 6 (six) hours as needed for Wheezing. Rescue 18 g 0    AUROVELA 1.5/30, 21, 1.5-30 mg-mcg Tab TAKE 1 TABLET BY MOUTH EVERY DAY. DO NOT. STOP TO HAVE PERIOD. TAKE CONTINUOUSLY      buPROPion (WELLBUTRIN SR) 150 MG TBSR 12 hr tablet Take 1 tablet (150 mg total) by mouth 2 (two) times daily. 60 tablet 11    cetirizine (ZYRTEC) 10 MG tablet Take 1 tablet (10 mg total) by mouth daily as needed for Allergies. 7 tablet 0    dextromethorphan-guaiFENesin  mg Tab Take 1 tablet by mouth every 4 (four) hours as needed (cough/congestion). 30 each 0    fluticasone propionate (FLONASE) 50 mcg/actuation nasal spray 2 sprays (100 mcg total) by Each Nostril route daily as needed for Rhinitis. 15.8 mL 0    benzocaine-menthoL 6-10 mg lozenge Take 1 lozenge by mouth every 2 (two) hours as needed (Sore Throat). 18 tablet 0    fluticasone propionate (FLONASE) 50 mcg/actuation nasal spray 1 spray (50 mcg total) by Each Nostril route once daily. 18.2 mL 0    ondansetron (ZOFRAN-ODT) 8 MG TbDL Take 1 tablet (8 mg total) by mouth every 8 (eight) hours as needed (nausea or vomiting). 10 tablet 0     No current facility-administered medications for this visit.     Review of patient's allergies indicates:   Allergen  "Reactions    Ciprofloxacin Anaphylaxis    Tamiflu [oseltamivir]       Past Medical History:   Diagnosis Date    Heart palpitations 2013    Juvenile arthritis     Juvenile rheumatoid arthritis      Past Surgical History:   Procedure Laterality Date    KNEE SURGERY Left 2013    reconstructive    rheuamtoid nodole removed Left 2004    Ring finger       Review of Systems   Constitutional:  Positive for chills and fever. Negative for activity change, appetite change, fatigue and unexpected weight change.   HENT:  Positive for sore throat. Negative for congestion, ear discharge, ear pain, facial swelling, postnasal drip, rhinorrhea, sinus pain, trouble swallowing and voice change.    Eyes:  Negative for photophobia, pain, discharge and visual disturbance.   Respiratory:  Negative for cough, chest tightness, shortness of breath and wheezing.    Cardiovascular:  Negative for chest pain and palpitations.   Gastrointestinal:  Positive for anorexia and nausea. Negative for abdominal pain, diarrhea and vomiting.   Endocrine: Negative for cold intolerance and heat intolerance.   Genitourinary:  Negative for difficulty urinating, dysuria and flank pain.   Musculoskeletal:  Positive for myalgias. Negative for arthralgias, gait problem and joint swelling.   Skin:  Negative for rash.   Allergic/Immunologic: Negative for environmental allergies and immunocompromised state.   Neurological:  Negative for speech difficulty and headaches.   Hematological:  Does not bruise/bleed easily.   Psychiatric/Behavioral:  Negative for confusion, dysphoric mood, self-injury and suicidal ideas.     OBJECTIVE:      Vitals:    03/07/23 1348   BP: 110/62   BP Location: Left arm   Patient Position: Sitting   BP Method: Large (Manual)   Pulse: 108   Resp: 20   Temp: 98.5 °F (36.9 °C)   TempSrc: Oral   SpO2: 98%   Weight: 85.6 kg (188 lb 12.8 oz)   Height: 5' 4" (1.626 m)     Physical Exam  Vitals and nursing note reviewed.   Constitutional:       " General: She is not in acute distress.     Appearance: Normal appearance. She is well-developed. She is not ill-appearing.   HENT:      Head: Normocephalic and atraumatic.      Right Ear: Tympanic membrane, ear canal and external ear normal.      Left Ear: Tympanic membrane, ear canal and external ear normal.      Nose: Nose normal. No mucosal edema, congestion or rhinorrhea.      Right Sinus: No maxillary sinus tenderness or frontal sinus tenderness.      Left Sinus: No maxillary sinus tenderness or frontal sinus tenderness.      Mouth/Throat:      Mouth: Mucous membranes are moist.      Pharynx: Oropharynx is clear. Uvula midline. Posterior oropharyngeal erythema present. No oropharyngeal exudate.   Eyes:      General:         Right eye: No discharge.         Left eye: No discharge.      Conjunctiva/sclera: Conjunctivae normal.      Pupils: Pupils are equal, round, and reactive to light.   Neck:      Thyroid: No thyromegaly.   Cardiovascular:      Rate and Rhythm: Normal rate and regular rhythm.      Heart sounds: Normal heart sounds.   Pulmonary:      Effort: Pulmonary effort is normal. No respiratory distress.      Breath sounds: Normal breath sounds. No wheezing, rhonchi or rales.   Abdominal:      General: Bowel sounds are normal. There is no distension.      Palpations: Abdomen is soft. There is no mass.      Tenderness: There is no abdominal tenderness. There is no guarding or rebound.   Musculoskeletal:      Cervical back: Normal range of motion and neck supple. No rigidity or tenderness.   Lymphadenopathy:      Cervical: No cervical adenopathy.   Skin:     General: Skin is warm and dry.      Coloration: Skin is not jaundiced or pale.   Neurological:      Mental Status: She is alert and oriented to person, place, and time.   Psychiatric:         Behavior: Behavior normal.         Thought Content: Thought content normal.         Judgment: Judgment normal.      Assessment:       1. Sore throat    2. Nausea     3. Acute cough    4. Acute viral syndrome          Plan:       Sore throat  -     POCT Strep A, Molecular (neg)    Nausea  -     ondansetron (ZOFRAN-ODT) 8 MG TbDL; Take 1 tablet (8 mg total) by mouth every 8 (eight) hours as needed (nausea or vomiting).  Dispense: 10 tablet; Refill: 0    Acute cough  -     POCT COVID-19 Rapid Screening (neg)  -     POCT Influenza A/B Molecular (neg)    Acute viral syndrome   -Make sure to drink plenty of clear fluids and stick to a bland diet.  Avoid spicy/rich foods, gravies/sauces, dairy products, and sugary foods/drinks.  I would avoid antidiarrheal medications if possible.  Tylenol or Motrin prn for fever or pain. Rest and Return to clinic if no improvement in 3 days.       Follow up if symptoms worsen or fail to improve.      3/7/2023 CLARKE Barnett, FNP    This note was created using [employer] voice recognition software that occasionally misinterprets phrases or words.

## 2023-03-08 NOTE — FIRST PROVIDER EVALUATION
"Medical screening examination initiated.  I have conducted a focused provider triage encounter, findings are as follows:    Brief history of present illness:  Presents with complaint of sore throat and neck pain.  Onset yesterday patient reports fever 1015.  States she went to urgent care and they did a COVID influenza and viral panel that while negative.      Vitals:    03/07/23 1834   BP: 136/88   BP Location: Left arm   Patient Position: Sitting   Pulse: 100   Resp: 16   Temp: 98.5 °F (36.9 °C)   TempSrc: Oral   SpO2: 98%   Weight: 85.3 kg (188 lb)   Height: 5' 4" (1.626 m)       Pertinent physical exam:  Patient has full range of motion to her neck.  She is afebrile while here.    Brief workup plan:  Labs.    Preliminary workup initiated; this workup will be continued and followed by the physician or advanced practice provider that is assigned to the patient when roomed.  "

## 2023-03-08 NOTE — ED PROVIDER NOTES
Encounter Date: 3/7/2023       History     Chief Complaint   Patient presents with    Fever     Stiff neck and fever last night. Neck is becoming increasingly stiff.     Neck pain (definately not torticollis)     HPI  27-year-old female with history of juvenile rheumatoid arthritis presenting for evaluation of sore throat, fever, and anterior neck fullness. Also reports bifrontal headache that improved with acetaminophen.    She was seen at an urgent care center yesterday and was given fluticasone, benzocaine lozenges, Mucinex DM, and cetirizine.  She is returning today because she feels like the pain has worsened.    She denies difficulty swallowing, difficulty handling secretions, difficulty talking, voice change, shortness of breath, cough, nausea, vomiting  Review of patient's allergies indicates:   Allergen Reactions    Ciprofloxacin Anaphylaxis    Tamiflu [oseltamivir]      Past Medical History:   Diagnosis Date    Heart palpitations 2013    Juvenile arthritis     Juvenile rheumatoid arthritis      Past Surgical History:   Procedure Laterality Date    KNEE SURGERY Left 2013    reconstructive    rheuamtoid nodole removed Left 2004    Ring finger     Family History   Problem Relation Age of Onset    Arthritis Father     Hypertension Father     Arthritis Mother     Diabetes Maternal Aunt     Diabetes Maternal Uncle     Diabetes Maternal Grandfather     Lupus Maternal Aunt     Melanoma Neg Hx     Psoriasis Neg Hx     Eczema Neg Hx      Social History     Tobacco Use    Smoking status: Never    Smokeless tobacco: Never   Substance Use Topics    Alcohol use: Not Currently    Drug use: No     Review of Systems   Constitutional:  Positive for chills and fever. Negative for diaphoresis and fatigue.   HENT:  Positive for sore throat. Negative for congestion, drooling, mouth sores, trouble swallowing and voice change.    Eyes:  Negative for visual disturbance.   Respiratory:  Negative for cough, shortness of breath and  stridor.    Cardiovascular:  Negative for chest pain.   Gastrointestinal:  Negative for abdominal pain, diarrhea, nausea and vomiting.   Endocrine: Negative for polyuria.   Genitourinary:  Negative for dysuria, frequency and urgency.   Musculoskeletal:  Positive for neck pain (anterior). Negative for back pain and neck stiffness.   Skin:  Negative for rash and wound.   Neurological:  Positive for headaches. Negative for facial asymmetry and light-headedness.     Physical Exam     Initial Vitals [03/07/23 1834]   BP Pulse Resp Temp SpO2   136/88 100 16 98.5 °F (36.9 °C) 98 %      MAP       --         Physical Exam    Nursing note and vitals reviewed.  Constitutional: She appears well-developed and well-nourished. She is not diaphoretic. She is cooperative.  Non-toxic appearance. She does not have a sickly appearance. She does not appear ill. No distress. She is not intubated.   HENT:   Head: Normocephalic and atraumatic.   Nose: Nose normal.   Mouth/Throat: Oropharynx is clear and moist.   Eyes: Conjunctivae and EOM are normal.   Neck: Trachea normal. Neck supple. No tracheal deviation present.   + tender left submandibular lymphadenopathy   Normal range of motion.  Cardiovascular:  Normal rate and regular rhythm.           Pulmonary/Chest: Effort normal and breath sounds normal. No accessory muscle usage. No tachypnea. She is not intubated. No respiratory distress. She has no wheezes. She has no rhonchi.   Abdominal: Abdomen is soft and flat. She exhibits no distension. There is no abdominal tenderness. There is no rebound and no guarding.   Musculoskeletal:         General: No tenderness or edema. Normal range of motion.      Cervical back: Normal range of motion and neck supple. No edema or erythema.     Neurological: She is alert and oriented to person, place, and time. No cranial nerve deficit.   Skin: Skin is warm and dry.   Psychiatric: Thought content normal.       ED Course   Procedures  PGY-3 MDM:  Albina  Beryl Beckham is a 27-year-old female with history of juvenile rheumatoid arthritis presenting for evaluation of sore throat, fever, and anterior neck fullness.     On arrival patient vitals were   Vitals:    03/07/23 1834   BP: 136/88   Pulse: 100   Resp: 16   Temp: 98.5 °F (36.9 °C)        On examination, patient Overall well-appearing,, Normal volume heart sounds, normal lung sounds, Abdomen soft/nontender/nondistended, Extremities without swelling or calf tenderness pharynx without evidence of uvular deviation, para tonsillar abscess, oral lesions, or subungual swelling. No stridor, speaking in full sentences, handling secretions.    Labs ordered & notable for:   CBC and CMP within acceptable limits.  Flu, COVID, Monospot, and strep test negative.  UA with some WBC/RBC but squamous cells present. Patient not having urinary symptoms, will follow up culture results.    Discussed results with patient and health plan including return precautions for worsening clinical condition. Advised to follow up with primary care provider or return to the emergency department if needed. Patient expressed understanding their health plan, has no further questions, and is stable for discharge at this time.  Patient able to independently ambulate out of the emergency department.     Paras Sandhu MD  [facility], PGY-3  10:24 PM  3/7/2023    Labs Reviewed   COMPREHENSIVE METABOLIC PANEL - Abnormal; Notable for the following components:       Result Value    Total Protein 8.7 (*)     Anion Gap 6 (*)     All other components within normal limits   URINALYSIS, REFLEX TO URINE CULTURE - Abnormal; Notable for the following components:    Protein, UA Trace (*)     Ketones, UA Trace (*)     Occult Blood UA 1+ (*)     Urobilinogen, UA 2.0-3.0 (*)     Leukocytes, UA 2+ (*)     All other components within normal limits    Narrative:     Specimen Source->Urine   URINALYSIS MICROSCOPIC - Abnormal; Notable for the following components:     RBC, UA 8 (*)     WBC, UA 11 (*)     Hyaline Casts, UA 15 (*)     All other components within normal limits    Narrative:     Specimen Source->Urine   GROUP A STREP, MOLECULAR   CULTURE, URINE   INFLUENZA A AND B ANTIGEN    Narrative:     Specimen Source->Nasopharyngeal Swab   SARS-COV-2 RNA AMPLIFICATION, QUAL   CBC W/ AUTO DIFFERENTIAL   HETEROPHILE AB SCREEN   POCT URINE PREGNANCY          Imaging Results    None          Medications - No data to display  Medical Decision Making:   Clinical Tests:   Lab Tests: Ordered and Reviewed  The following lab test(s) were unremarkable: CBC and CMP       <> Summary of Lab: Mono spot negative  COVID and flu negative, strep negative    Urine with trace ketones trace protein 1+ blood 2-3 urobilinogen 2+ leukocytes, 8 red blood cells 11 white blood cells no bacteria 7 squamous cells  ED Management:  Attending Note:  I provided a face to face evaluation of this patient.  I discussed the patient's care with the Resident.  I reviewed their note and agree with the history, physical, assessment, diagnosis, treatment, all procedures performed, xray and EKG interpretations and discharge plan provided by the Resident. My overall impression is sore throat, fever.  I personally evaluated this patient patient has submandibular lymphadenopathy which she is describing as a neck tightness and feels that she is pain when ranging the neck.  She is no posterior neck pain.  Full range of motion of the neck without meningismus or nuchal rigidity.  She reports headache has improved since yesterday.  She is no altered mental status.  No fevers today.  Her vitals are normal.  She is well-appearing nonseptic or toxic.  I do not believe she has concern for meningitis or encephalitis at this time.  She is been evaluated both here and at the urgent care for flu and COVID and both are negative.  She was strep negative as well and mono negative.  Urine did show some signs infection but also was a  contaminated specimen and she is no UTI symptoms.  CBC and CMP is develops neck pain stiffness or fever.  But I believe she is safe for discharge home with a viral URI  The patient has been instructed to follow up with their physician or the one provided as well as specific return precautions.   Nolvia Skaggs M.D. 3/8/2023 1:34 AM                            Clinical Impression:   Final diagnoses:  [J02.9] Sore throat (Primary)  [R50.9] Fever, unspecified fever cause        ED Disposition Condition    Discharge Stable          ED Prescriptions    None       Follow-up Information       Follow up With Specialties Details Why Contact Info Additional Information    [facility] - Emergency Dept Emergency Medicine  As needed, If symptoms worsen [address] [id number] [phone] 1st floor    Fredy Leach MD Family Medicine  Make an appointment for a checkup with your primary care provider [address]  [address]  [facility] [address]  [phone]                Paras Sandhu MD  Resident  03/07/23 5572       Nolvia Skaggs MD  03/08/23 7608

## 2023-03-08 NOTE — DISCHARGE INSTRUCTIONS
Continue to take your current medications that were prescribed to you at the urgent care center.    Seek re-evaluation if you have worsening difficulty breathing, difficulty tolerating secretions (drooling), high fever, or significantly worsening pain.    Go to the Emergency Department if you experience worsening, failure to improve, or have any questions, concerns, new or concerning symptoms  [unfilled]   Here are the results from today. Please feel free to show these results to your doctor or at your next appointment, or to have as a reference for yourself.  Recent Results (from the past 24 hour(s))   POCT Strep A, Molecular    Collection Time: 03/07/23  2:08 PM   Result Value Ref Range    Molecular Strep A, POC Negative Negative     Acceptable Yes    POCT Influenza A/B Molecular    Collection Time: 03/07/23  2:16 PM   Result Value Ref Range    POC Molecular Influenza A Ag Negative Negative, Not Reported    POC Molecular Influenza B Ag Negative Negative, Not Reported     Acceptable Yes    POCT COVID-19 Rapid Screening    Collection Time: 03/07/23  2:29 PM   Result Value Ref Range    POC Rapid COVID Negative Negative     Acceptable Yes    Group A Strep, Molecular    Collection Time: 03/07/23  6:38 PM    Specimen: Throat   Result Value Ref Range    Group A Strep, Molecular Negative Negative   Influenza antigen Nasopharyngeal Swab    Collection Time: 03/07/23  6:38 PM   Result Value Ref Range    Influenza A, Molecular Negative Negative    Influenza B, Molecular Negative Negative    Flu A & B Source Nasal swab    COVID-19 Rapid Screening    Collection Time: 03/07/23  6:38 PM   Result Value Ref Range    SARS-CoV-2 RNA, Amplification, Qual Negative Negative   CBC auto differential    Collection Time: 03/07/23  7:51 PM   Result Value Ref Range    WBC 6.54 3.90 - 12.70 K/uL    RBC 4.69 4.00 - 5.40 M/uL    Hemoglobin 14.1 12.0 - 16.0 g/dL    Hematocrit 42.6 37.0 - 48.5 %     MCV 91 82 - 98 fL    MCH 30.1 27.0 - 31.0 pg    MCHC 33.1 32.0 - 36.0 g/dL    RDW 12.6 11.5 - 14.5 %    Platelets 316 150 - 450 K/uL    MPV 9.3 9.2 - 12.9 fL    Immature Granulocytes 0.3 0.0 - 0.5 %    Gran # (ANC) 3.2 1.8 - 7.7 K/uL    Immature Grans (Abs) 0.02 0.00 - 0.04 K/uL    Lymph # 2.5 1.0 - 4.8 K/uL    Mono # 0.8 0.3 - 1.0 K/uL    Eos # 0.1 0.0 - 0.5 K/uL    Baso # 0.04 0.00 - 0.20 K/uL    nRBC 0 0 /100 WBC    Gran % 48.3 38.0 - 73.0 %    Lymph % 38.1 18.0 - 48.0 %    Mono % 11.6 4.0 - 15.0 %    Eosinophil % 1.1 0.0 - 8.0 %    Basophil % 0.6 0.0 - 1.9 %    Differential Method Automated    Comprehensive metabolic panel    Collection Time: 03/07/23  7:51 PM   Result Value Ref Range    Sodium 137 136 - 145 mmol/L    Potassium 4.0 3.5 - 5.1 mmol/L    Chloride 104 95 - 110 mmol/L    CO2 27 23 - 29 mmol/L    Glucose 106 70 - 110 mg/dL    BUN 8 6 - 20 mg/dL    Creatinine 0.7 0.5 - 1.4 mg/dL    Calcium 9.2 8.7 - 10.5 mg/dL    Total Protein 8.7 (H) 6.0 - 8.4 g/dL    Albumin 4.0 3.5 - 5.2 g/dL    Total Bilirubin 0.5 0.1 - 1.0 mg/dL    Alkaline Phosphatase 57 55 - 135 U/L    AST 23 10 - 40 U/L    ALT 13 10 - 44 U/L    Anion Gap 6 (L) 8 - 16 mmol/L    eGFR >60.0 >60 mL/min/1.73 m^2   Urinalysis, Reflex to Urine Culture Urine, Clean Catch    Collection Time: 03/07/23  8:40 PM    Specimen: Urine   Result Value Ref Range    Specimen UA Urine, Clean Catch     Color, UA Yellow Yellow, Straw, Rosanne    Appearance, UA Clear Clear    pH, UA 6.0 5.0 - 8.0    Specific Gravity, UA 1.030 1.005 - 1.030    Protein, UA Trace (A) Negative    Glucose, UA Negative Negative    Ketones, UA Trace (A) Negative    Bilirubin (UA) Negative Negative    Occult Blood UA 1+ (A) Negative    Nitrite, UA Negative Negative    Urobilinogen, UA 2.0-3.0 (A) Negative EU/dL    Leukocytes, UA 2+ (A) Negative   Urinalysis Microscopic    Collection Time: 03/07/23  8:40 PM   Result Value Ref Range    RBC, UA 8 (H) 0 - 4 /hpf    WBC, UA 11 (H) 0 - 5 /hpf     Bacteria Negative None-Occ /hpf    Squam Epithel, UA 7 /hpf    Hyaline Casts, UA 15 (A) 0-1/lpf /lpf    Microscopic Comment SEE COMMENT    Heterophile Ab Screen    Collection Time: 03/07/23  8:55 PM   Result Value Ref Range    Monospot Negative Negative       Imaging Results    None

## 2023-03-09 LAB
BACTERIA UR CULT: NORMAL
BACTERIA UR CULT: NORMAL

## 2023-05-26 ENCOUNTER — OFFICE VISIT (OUTPATIENT)
Dept: FAMILY MEDICINE | Facility: CLINIC | Age: 28
End: 2023-05-26
Payer: MEDICAID

## 2023-05-26 VITALS
WEIGHT: 184.19 LBS | BODY MASS INDEX: 31.45 KG/M2 | HEIGHT: 64 IN | HEART RATE: 96 BPM | OXYGEN SATURATION: 99 % | DIASTOLIC BLOOD PRESSURE: 79 MMHG | SYSTOLIC BLOOD PRESSURE: 124 MMHG

## 2023-05-26 DIAGNOSIS — N91.2 AMENORRHEA: Primary | ICD-10-CM

## 2023-05-26 DIAGNOSIS — L20.9 ATOPIC DERMATITIS, UNSPECIFIED TYPE: ICD-10-CM

## 2023-05-26 LAB
B-HCG UR QL: NEGATIVE
CTP QC/QA: YES

## 2023-05-26 PROCEDURE — 3074F PR MOST RECENT SYSTOLIC BLOOD PRESSURE < 130 MM HG: ICD-10-PCS | Mod: CPTII,,, | Performed by: FAMILY MEDICINE

## 2023-05-26 PROCEDURE — 99213 OFFICE O/P EST LOW 20 MIN: CPT | Mod: 25,S$PBB,, | Performed by: FAMILY MEDICINE

## 2023-05-26 PROCEDURE — 99213 PR OFFICE/OUTPT VISIT, EST, LEVL III, 20-29 MIN: ICD-10-PCS | Mod: 25,S$PBB,, | Performed by: FAMILY MEDICINE

## 2023-05-26 PROCEDURE — 1159F PR MEDICATION LIST DOCUMENTED IN MEDICAL RECORD: ICD-10-PCS | Mod: CPTII,,, | Performed by: FAMILY MEDICINE

## 2023-05-26 PROCEDURE — 99213 OFFICE O/P EST LOW 20 MIN: CPT | Performed by: FAMILY MEDICINE

## 2023-05-26 PROCEDURE — 3078F DIAST BP <80 MM HG: CPT | Mod: CPTII,,, | Performed by: FAMILY MEDICINE

## 2023-05-26 PROCEDURE — 3074F SYST BP LT 130 MM HG: CPT | Mod: CPTII,,, | Performed by: FAMILY MEDICINE

## 2023-05-26 PROCEDURE — 1159F MED LIST DOCD IN RCRD: CPT | Mod: CPTII,,, | Performed by: FAMILY MEDICINE

## 2023-05-26 PROCEDURE — 3008F PR BODY MASS INDEX (BMI) DOCUMENTED: ICD-10-PCS | Mod: CPTII,,, | Performed by: FAMILY MEDICINE

## 2023-05-26 PROCEDURE — 3078F PR MOST RECENT DIASTOLIC BLOOD PRESSURE < 80 MM HG: ICD-10-PCS | Mod: CPTII,,, | Performed by: FAMILY MEDICINE

## 2023-05-26 PROCEDURE — 81025 URINE PREGNANCY TEST: CPT | Mod: PBBFAC | Performed by: FAMILY MEDICINE

## 2023-05-26 PROCEDURE — 3008F BODY MASS INDEX DOCD: CPT | Mod: CPTII,,, | Performed by: FAMILY MEDICINE

## 2023-05-26 RX ORDER — HYDROXYZINE HYDROCHLORIDE 10 MG/1
25 TABLET, FILM COATED ORAL NIGHTLY PRN
Qty: 30 TABLET | Refills: 1 | Status: SHIPPED | OUTPATIENT
Start: 2023-05-26 | End: 2023-06-25

## 2023-05-26 RX ORDER — METHYLPREDNISOLONE 4 MG/1
TABLET ORAL
Qty: 21 EACH | Refills: 0 | Status: SHIPPED | OUTPATIENT
Start: 2023-05-26 | End: 2023-06-16

## 2023-05-26 NOTE — PROGRESS NOTES
Subjective:       Patient ID: Albina Beckham is a 27 y.o. female.    Chief Complaint: Urticaria      Started on Monday she woke up with a rash on the chest quickly spread to the arms both sides of the body.  LMP 4/23/23 started back on birth control 3 weeks ago    Urticaria  This is a new problem. The current episode started in the past 7 days. The problem has been gradually worsening since onset. The affected locations include the right upper leg, left elbow, chest and right elbow.     Allergies and Medications:   Review of patient's allergies indicates:   Allergen Reactions    Ciprofloxacin Anaphylaxis    Tamiflu [oseltamivir]      Current Outpatient Medications   Medication Sig Dispense Refill    albuterol (VENTOLIN HFA) 90 mcg/actuation inhaler Inhale 2 puffs into the lungs every 6 (six) hours as needed for Wheezing. Rescue (Patient not taking: Reported on 5/26/2023) 18 g 0    AUROVELA 1.5/30, 21, 1.5-30 mg-mcg Tab TAKE 1 TABLET BY MOUTH EVERY DAY. DO NOT. STOP TO HAVE PERIOD. TAKE CONTINUOUSLY      benzocaine-menthoL 6-10 mg lozenge Take 1 lozenge by mouth every 2 (two) hours as needed (Sore Throat). (Patient not taking: Reported on 5/26/2023) 18 tablet 0    buPROPion (WELLBUTRIN SR) 150 MG TBSR 12 hr tablet Take 1 tablet (150 mg total) by mouth 2 (two) times daily. 60 tablet 11    cetirizine (ZYRTEC) 10 MG tablet Take 1 tablet (10 mg total) by mouth daily as needed for Allergies. 7 tablet 0    fluticasone propionate (FLONASE) 50 mcg/actuation nasal spray 1 spray (50 mcg total) by Each Nostril route once daily. (Patient not taking: Reported on 5/26/2023) 18.2 mL 0    hydrOXYzine HCL (ATARAX) 10 MG Tab Take 3 tablets (30 mg total) by mouth nightly as needed (Itching). 30 tablet 1    methylPREDNISolone (MEDROL DOSEPACK) 4 mg tablet use as directed 21 each 0    ondansetron (ZOFRAN-ODT) 8 MG TbDL Take 1 tablet (8 mg total) by mouth every 8 (eight) hours as needed (nausea or vomiting). (Patient not taking:  Reported on 5/26/2023) 10 tablet 0     No current facility-administered medications for this visit.       Family History:   Family History   Problem Relation Age of Onset    Arthritis Father     Hypertension Father     Arthritis Mother     Diabetes Maternal Aunt     Diabetes Maternal Uncle     Diabetes Maternal Grandfather     Lupus Maternal Aunt     Melanoma Neg Hx     Psoriasis Neg Hx     Eczema Neg Hx        Social History:   Social History     Socioeconomic History    Marital status: Single    Number of children: 1   Occupational History    Occupation: bank teller     Comment: [employer]   Tobacco Use    Smoking status: Never    Smokeless tobacco: Never   Substance and Sexual Activity    Alcohol use: Not Currently    Drug use: No    Sexual activity: Yes     Partners: Male       Review of Systems    Objective:     Vitals:    05/26/23 1503   BP: 124/79   Pulse: 96        Physical Exam  Skin:             Assessment:       1. Amenorrhea    2. Atopic dermatitis, unspecified type        Plan:       Albina was seen today for urticaria.    Diagnoses and all orders for this visit:    Amenorrhea  -     POCT urine pregnancy    Atopic dermatitis, unspecified type  -     methylPREDNISolone (MEDROL DOSEPACK) 4 mg tablet; use as directed  -     hydrOXYzine HCL (ATARAX) 10 MG Tab; Take 3 tablets (30 mg total) by mouth nightly as needed (Itching).         Follow up in about 1 month (around 6/26/2023), or if symptoms worsen or fail to improve.

## 2023-07-25 ENCOUNTER — TELEPHONE (OUTPATIENT)
Dept: FAMILY MEDICINE | Facility: CLINIC | Age: 28
End: 2023-07-25

## 2024-01-02 ENCOUNTER — HOSPITAL ENCOUNTER (EMERGENCY)
Facility: HOSPITAL | Age: 29
Discharge: HOME | End: 2024-01-02
Attending: EMERGENCY MEDICINE
Payer: COMMERCIAL

## 2024-01-02 VITALS
OXYGEN SATURATION: 100 % | TEMPERATURE: 98 F | RESPIRATION RATE: 18 BRPM | BODY MASS INDEX: 30.56 KG/M2 | HEART RATE: 79 BPM | DIASTOLIC BLOOD PRESSURE: 70 MMHG | SYSTOLIC BLOOD PRESSURE: 118 MMHG | HEIGHT: 64 IN | WEIGHT: 179 LBS

## 2024-01-02 DIAGNOSIS — M54.41 ACUTE RIGHT-SIDED LOW BACK PAIN WITH RIGHT-SIDED SCIATICA: Primary | ICD-10-CM

## 2024-01-02 PROCEDURE — 99284 EMERGENCY DEPT VISIT MOD MDM: CPT

## 2024-01-02 PROCEDURE — 63600175 PHARM REV CODE 636 W HCPCS: Performed by: NURSE PRACTITIONER

## 2024-01-02 PROCEDURE — 96372 THER/PROPH/DIAG INJ SC/IM: CPT | Performed by: NURSE PRACTITIONER

## 2024-01-02 RX ORDER — DEXAMETHASONE SODIUM PHOSPHATE 4 MG/ML
8 INJECTION, SOLUTION INTRA-ARTICULAR; INTRALESIONAL; INTRAMUSCULAR; INTRAVENOUS; SOFT TISSUE
Status: COMPLETED | OUTPATIENT
Start: 2024-01-02 | End: 2024-01-02

## 2024-01-02 RX ORDER — KETOROLAC TROMETHAMINE 30 MG/ML
30 INJECTION, SOLUTION INTRAMUSCULAR; INTRAVENOUS
Status: COMPLETED | OUTPATIENT
Start: 2024-01-02 | End: 2024-01-02

## 2024-01-02 RX ORDER — IBUPROFEN 800 MG/1
800 TABLET ORAL EVERY 8 HOURS PRN
Qty: 20 TABLET | Refills: 0 | Status: SHIPPED | OUTPATIENT
Start: 2024-01-02

## 2024-01-02 RX ORDER — METHOCARBAMOL 500 MG/1
1000 TABLET, FILM COATED ORAL EVERY 8 HOURS PRN
Qty: 30 TABLET | Refills: 0 | Status: SHIPPED | OUTPATIENT
Start: 2024-01-02 | End: 2024-01-07

## 2024-01-02 RX ADMIN — DEXAMETHASONE SODIUM PHOSPHATE 8 MG: 4 INJECTION, SOLUTION INTRA-ARTICULAR; INTRALESIONAL; INTRAMUSCULAR; INTRAVENOUS; SOFT TISSUE at 10:01

## 2024-01-02 RX ADMIN — KETOROLAC TROMETHAMINE 30 MG: 30 INJECTION, SOLUTION INTRAMUSCULAR; INTRAVENOUS at 10:01

## 2024-01-02 NOTE — ED PROVIDER NOTES
Encounter Date: 1/2/2024       History     Chief Complaint   Patient presents with    Back Pain     Low back pain onset Friday unrelieved by tylenol; denies injury.     Albina Beckham is a 28 year old female with pmh RA presenting to the ED with c/o low back pain. She states she began having pain several days ago without injury or trauma. Pain is worse on the right side and radiates into the buttock at times. She has had no saddle anesthesia, bowel/bladder incontinence, lower extremity weakness. She has taken tylenol/ibuprofen without relief of pain. She denies urinary symptoms and flank pain.       Review of patient's allergies indicates:   Allergen Reactions    Ciprofloxacin Anaphylaxis    Tamiflu [oseltamivir]      Past Medical History:   Diagnosis Date    Heart palpitations 2013    Juvenile arthritis     Juvenile rheumatoid arthritis      Past Surgical History:   Procedure Laterality Date    KNEE SURGERY Left 2013    reconstructive    rheuamtoid nodole removed Left 2004    Ring finger     Family History   Problem Relation Age of Onset    Arthritis Father     Hypertension Father     Arthritis Mother     Diabetes Maternal Aunt     Diabetes Maternal Uncle     Diabetes Maternal Grandfather     Lupus Maternal Aunt     Melanoma Neg Hx     Psoriasis Neg Hx     Eczema Neg Hx      Social History     Tobacco Use    Smoking status: Never    Smokeless tobacco: Never   Substance Use Topics    Alcohol use: Not Currently    Drug use: No     Review of Systems   Constitutional:  Negative for fever.   HENT:  Negative for sore throat.    Respiratory:  Negative for shortness of breath.    Cardiovascular:  Negative for chest pain.   Gastrointestinal:  Negative for nausea.   Genitourinary:  Negative for dysuria, flank pain and frequency.   Musculoskeletal:  Positive for back pain.   Skin:  Negative for rash.   Neurological:  Negative for weakness.   Hematological:  Does not bruise/bleed easily.       Physical Exam     Initial Vitals  [01/02/24 0835]   BP Pulse Resp Temp SpO2   (!) 110/58 80 18 98.4 °F (36.9 °C) 100 %      MAP       --         Physical Exam    Nursing note and vitals reviewed.  Constitutional: She appears well-developed and well-nourished. She is not diaphoretic. No distress.   HENT:   Head: Normocephalic and atraumatic.   Mouth/Throat: Oropharynx is clear and moist.   Eyes: Conjunctivae are normal.   Neck: Neck supple.   Cardiovascular:  Normal rate, regular rhythm, normal heart sounds and intact distal pulses.     Exam reveals no gallop and no friction rub.       No murmur heard.  Pulmonary/Chest: Breath sounds normal. No respiratory distress. She has no wheezes. She has no rhonchi. She has no rales.   Abdominal: Abdomen is soft. She exhibits no distension. There is no abdominal tenderness.   Musculoskeletal:      Cervical back: Neck supple.      Lumbar back: Tenderness present. No bony tenderness. Normal range of motion. Positive left straight leg raise test. Negative right straight leg raise test.        Back:      Neurological: She is alert and oriented to person, place, and time.   Skin: No rash noted. No erythema.   Psychiatric: Her speech is normal.         ED Course   Procedures  Labs Reviewed   POCT URINE PREGNANCY          Imaging Results    None          Medications   ketorolac injection 30 mg (30 mg Intramuscular Given 1/2/24 1054)   dexAMETHasone injection 8 mg (8 mg Intramuscular Given 1/2/24 1054)     Medical Decision Making  This is an urgent evaluation of a 28 year old female presenting to the ED with c/o low back pain. The patient's back pain is likely a musculoskeletal strain.  There are no signs of saddle anesthesia, incontinence, neurologic deficits, fevers, trauma or midline tenderness on history or physical to suggest cauda equina, infectious process, fracture or subluxation.  I will treat with anti-inflammatories and muscle relaxers for relief. Instructed to avoid strenuous activity/heavy lifting and to  follow up with PCP next week. She was treated with IM Toradol and decadron in the ED. STrict ED return precautions discussed and she verbalized understanding. Based on my clinical evaluation, I do not appreciate any immediate, emergent, or life threatening condition or etiology that warrants additional workup today and feel that the patient can be discharged with close follow up care.         Amount and/or Complexity of Data Reviewed  Labs: ordered.    Risk  Prescription drug management.                                      Clinical Impression:  Final diagnoses:  [M54.41] Acute right-sided low back pain with right-sided sciatica (Primary)          ED Disposition Condition    Discharge Stable          ED Prescriptions       Medication Sig Dispense Start Date End Date Auth. Provider    methocarbamoL (ROBAXIN) 500 MG Tab Take 2 tablets (1,000 mg total) by mouth every 8 (eight) hours as needed (muscle spasm). 30 tablet 1/2/2024 1/7/2024 Faby Chavez NP    ibuprofen (ADVIL,MOTRIN) 800 MG tablet Take 1 tablet (800 mg total) by mouth every 8 (eight) hours as needed for Pain. 20 tablet 1/2/2024 -- Faby Chavez NP          Follow-up Information       Follow up With Specialties Details Why Contact Info Additional Information    [facility] - Emergency Dept Emergency Medicine  As needed, If symptoms worsen [address] [facility] [address] [facility] [id number] [phone] 1st floor    Fredy Leach MD Family Medicine Schedule an appointment as soon as possible for a visit in 1 week  [address]  [address]  [facility] [address]  [phone]                Faby Chavez NP  01/02/24 1136       Faby Chavez NP  01/02/24 1130

## 2024-01-02 NOTE — Clinical Note
"Albina Paez" Chapincito was seen and treated in our emergency department on 1/2/2024.  She may return to work on 01/04/2024.       If you have any questions or concerns, please don't hesitate to call.      Faby Chavez NP"

## 2024-01-04 ENCOUNTER — HOSPITAL ENCOUNTER (EMERGENCY)
Facility: HOSPITAL | Age: 29
Discharge: HOME | End: 2024-01-04
Attending: EMERGENCY MEDICINE
Payer: COMMERCIAL

## 2024-01-04 VITALS
BODY MASS INDEX: 30.56 KG/M2 | OXYGEN SATURATION: 99 % | DIASTOLIC BLOOD PRESSURE: 83 MMHG | SYSTOLIC BLOOD PRESSURE: 132 MMHG | HEIGHT: 64 IN | WEIGHT: 179 LBS | TEMPERATURE: 98 F | HEART RATE: 85 BPM | RESPIRATION RATE: 20 BRPM

## 2024-01-04 DIAGNOSIS — S32.009K PSEUDOARTHROSIS OF LUMBAR SPINE: ICD-10-CM

## 2024-01-04 DIAGNOSIS — Q76.49 SACRALIZATION OF LUMBAR VERTEBRA: Primary | ICD-10-CM

## 2024-01-04 LAB
B-HCG UR QL: NEGATIVE
CTP QC/QA: YES

## 2024-01-04 PROCEDURE — 81025 URINE PREGNANCY TEST: CPT | Performed by: NURSE PRACTITIONER

## 2024-01-04 PROCEDURE — 99284 EMERGENCY DEPT VISIT MOD MDM: CPT

## 2024-01-04 PROCEDURE — 96372 THER/PROPH/DIAG INJ SC/IM: CPT | Performed by: NURSE PRACTITIONER

## 2024-01-04 PROCEDURE — 63600175 PHARM REV CODE 636 W HCPCS: Performed by: NURSE PRACTITIONER

## 2024-01-04 RX ORDER — KETOROLAC TROMETHAMINE 30 MG/ML
15 INJECTION, SOLUTION INTRAMUSCULAR; INTRAVENOUS
Status: COMPLETED | OUTPATIENT
Start: 2024-01-04 | End: 2024-01-04

## 2024-01-04 RX ADMIN — KETOROLAC TROMETHAMINE 15 MG: 30 INJECTION, SOLUTION INTRAMUSCULAR; INTRAVENOUS at 03:01

## 2024-01-04 NOTE — ED PROVIDER NOTES
Encounter Date: 1/4/2024       History     Chief Complaint   Patient presents with    Low-back Pain     Patient states she began having lower right back pain and went to [facility], was given pain medication and muscle relaxer, states she didn't get an xray or anything and the pain is worsening and experiencing nausea      Patient is a 28 y.o. female who presents to the ED 01/04/2024 with a chief complaint of Lower back pain since Friday 6 days ago.  Patient states on Tuesday she went to the emergency department because it got acutely worse.  She states they gave her some medicine which is helping but they did not do any test.  She denies any injury or trauma to her back.  She states it is worse when she bends over or moves a certain way.  She denies any abdominal pain or urinary symptoms.  She denies any nausea, vomiting, fever.  She denies any history of IV drug abuse or cancer.  She denies any bowel or bladder incontinence.  She denies any numbness or weakness to her extremities.  She does have a history of RA but takes no medications for this.  She denies pregnancy             Review of patient's allergies indicates:   Allergen Reactions    Ciprofloxacin Anaphylaxis    Tamiflu [oseltamivir]      Past Medical History:   Diagnosis Date    Heart palpitations 2013    Juvenile arthritis     Juvenile rheumatoid arthritis      Past Surgical History:   Procedure Laterality Date    KNEE SURGERY Left 2013    reconstructive    rheuamtoid nodole removed Left 2004    Ring finger     Family History   Problem Relation Age of Onset    Arthritis Father     Hypertension Father     Arthritis Mother     Diabetes Maternal Aunt     Diabetes Maternal Uncle     Diabetes Maternal Grandfather     Lupus Maternal Aunt     Melanoma Neg Hx     Psoriasis Neg Hx     Eczema Neg Hx      Social History     Tobacco Use    Smoking status: Never    Smokeless tobacco: Never   Substance Use Topics    Alcohol use: Not Currently    Drug use: No     Review of  Systems   Constitutional:  Negative for chills and fever.   HENT:  Negative for sore throat.    Respiratory:  Negative for chest tightness and shortness of breath.    Cardiovascular:  Negative for chest pain.   Gastrointestinal:  Negative for abdominal pain.   Genitourinary:  Negative for difficulty urinating and dysuria.   Musculoskeletal:  Positive for back pain. Negative for arthralgias and myalgias.   Skin:  Negative for rash and wound.   Neurological:  Negative for syncope, weakness and numbness.   Hematological:  Does not bruise/bleed easily.       Physical Exam     Initial Vitals   BP Pulse Resp Temp SpO2   01/04/24 1132 01/04/24 1132 01/04/24 1132 01/04/24 1137 01/04/24 1132   132/83 85 20 98.1 °F (36.7 °C) 99 %      MAP       --                Physical Exam    Nursing note and vitals reviewed.  Constitutional: Vital signs are normal. She appears well-developed and well-nourished.   HENT:   Head: Normocephalic and atraumatic.   Eyes: Pupils are equal, round, and reactive to light.   Neck: Neck supple.   Cardiovascular:  Normal rate, regular rhythm, normal heart sounds and intact distal pulses.     Exam reveals no gallop and no friction rub.       No murmur heard.  Pulmonary/Chest: Breath sounds normal. She has no wheezes. She has no rhonchi. She has no rales.   Abdominal: Abdomen is soft. Bowel sounds are normal. There is no abdominal tenderness.   No right CVA tenderness.  No left CVA tenderness.   Musculoskeletal:      Cervical back: Normal and neck supple.      Thoracic back: Normal.      Lumbar back: Tenderness present. No swelling, edema, deformity, signs of trauma, lacerations, spasms or bony tenderness. Normal range of motion. Negative right straight leg raise test and negative left straight leg raise test. No scoliosis.     Neurological: She is alert and oriented to person, place, and time. She has normal strength.   5/5 strength and normal sensation to bi lower extremities.    Skin: Skin is warm,  dry and intact.   Psychiatric: She has a normal mood and affect. Her speech is normal and behavior is normal.         ED Course   Procedures  Labs Reviewed   POCT URINE PREGNANCY          Imaging Results              X-Ray Lumbar Spine Ap And Lateral (Final result)  Result time 01/04/24 15:07:22      Final result by Andrae Koch MD (01/04/24 15:07:22)                   Impression:      1. As above      Electronically signed by: Andrae Koch MD  Date:    01/04/2024  Time:    15:07               Narrative:    EXAMINATION:  XR LUMBAR SPINE AP AND LATERAL    CLINICAL HISTORY:  pain;    TECHNIQUE:  AP, lateral and spot images were performed of the lumbar spine.    COMPARISON:  None    FINDINGS:  Bone density is normal.  The lumbar vertebral bodies maintain normal height and alignment.  There is no fracture or malalignment.  There is sacralization of L5 on the right with pseudoarthrosis.  There is mild disc space narrowing at the L5-S1 level.  The remainder of the disc spaces are preserved.  The facet joints maintain normal articulation.  The paraspinous soft tissues are normal.                                       Medications   ketorolac injection 15 mg (15 mg Intramuscular Given 1/4/24 1508)     Medical Decision Making  Amount and/or Complexity of Data Reviewed  Labs: ordered.  Radiology: ordered.    Risk  Prescription drug management.         APC / Resident Notes:   Patient is a 28 y.o. female who presents to the ED 01/04/2024 who underwent emergent evaluation for lower back pain. The patient has no history of major trauma.  The patient's age is not greater than 70 or less than 20 years old. I do not suspect an infectious, cancerous, or vascular etiology as the cause of the low back pain. The patient does not have a history of cancer or unexplained weight loss suggesting metastatic disease.  The patient does not have persistent fevers or night sweats. The patient is not immunocompromised. SHe denies IV  drug use.  I do not think this patient has an epidural abscess, osteomyelitis, or metastatic spine lesions.  Pt does not have a history of  aortic aneurysm and I do not think there is evidence of retroperitoneal rupture.  The patient has a normal neurological exam and does not show evidence of cord or root compression.  The patient does not exhibit signs of urinary retention, urinary incontinence, bowel incontinence, or saddle anesthesia.  There is no evidence of cauda equine syndrome.      Pt requesting imaging specifically. Explained the risk versus benefit of Xray, CT, or MRI  at this time. Pt would like xray.  Xray without acute fracture or other findings. Discussed chronic findings with pt. I do not think further imaging such as CT or MRI indicated. Outpatient imaging can be obtained at the discretion of the primary care Physician.     Pt's symptoms were treated and improved with antiinflammatories, she has muscle relaxer's at home.     I will offer this patient symptomatic treatment, reassurance, and education. Discussed close follow up care with Primary care doctor; patient verbalized understanding; case discussed with  who is agreeable to plan of care.      Pt agrees with assessment, disposition and treatment plan; all questions answered.                 Attending Attestation:     Physician Attestation Statement for NP/PA:   I have directed and reviewed the workup performed by the PA/NP.  I performed the substantive portion of the medical decision making.     Other NP/PA Attestation Additions:    History of Present Illness: 28-year-old female presents with lower back pain.    Medical Decision Making: Initial differential diagnosis included but not limited to strain, musculoskeletal pain, and sciatica.  I am in agreement with the nurse practitioner's assessment, treatment, and plan of care.                        Medical Decision Making:   Differential Diagnosis:   Lumbar  strain  Discitis  sciatica             Clinical Impression:  Final diagnoses:  [Q76.49] Sacralization of lumbar vertebra (Primary)  [S32.009K] Pseudoarthrosis of lumbar spine          ED Disposition Condition    Discharge Stable          ED Prescriptions    None       Follow-up Information       Follow up With Specialties Details Why Contact Info Additional Information    Fredy Leach MD Family Medicine In 2 days  [address]  [address]  [facility] [address]  [phone]       On license of [facility]  As needed, If symptoms worsen [address] [place] [address] [address] [place] [address] 1st floor             Tita Gross, VINNIE  01/04/24 1537       Josiah Regan MD  01/04/24 1616

## 2024-01-04 NOTE — FIRST PROVIDER EVALUATION
Emergency Department TeleTriage Encounter Note      CHIEF COMPLAINT    Chief Complaint   Patient presents with    Low-back Pain     Patient states she began having lower right back pain and went to [facility], was given pain medication and muscle relaxer, states she didn't get an xray or anything and the pain is worsening and experiencing nausea        VITAL SIGNS   Initial Vitals [01/04/24 1132]   BP Pulse Resp Temp SpO2   132/83 85 20 -- 99 %      MAP       --            ALLERGIES    Review of patient's allergies indicates:   Allergen Reactions    Ciprofloxacin Anaphylaxis    Tamiflu [oseltamivir]        PROVIDER TRIAGE NOTE  Patient presents with complaint of right lower back pain denies injury.  Denies numbness or tingling to lower extremities.  Denies loss of bowel or bladder control.  Seen at [facility] given muscle relaxers and ibuprofen with mild improvement.      Phy:   Constitutional: well nourished, well developed, appearing stated age, NAD        Initial orders will be placed and care will be transferred to an alternate provider when patient is roomed for a full evaluation. Any additional orders and the final disposition will be determined by that provider.        ORDERS  Labs Reviewed - No data to display    ED Orders (720h ago, onward)      None              Virtual Visit Note: The provider triage portion of this emergency department evaluation and documentation was performed via [employer], a HIPAA-compliant telemedicine application, in concert with a tele-presenter in the room. A face to face patient evaluation with one of my colleagues will occur once the patient is placed in an emergency department room.      DISCLAIMER: This note was prepared with [employer] voice recognition transcription software. Garbled syntax, mangled pronouns, and other bizarre constructions may be attributed to that software system.

## 2024-01-04 NOTE — ED NOTES
Upon discharge, patient is AAOx4, no cardiac or respiratory complications. Follow up care and  Medications have been reviewed with patient and has been instructed to return to the ER if needed. Patient verbalized understanding and ambulated to the lobby without difficulty. SANDRA HARRISON.

## 2024-01-04 NOTE — ED NOTES
Albina Beckham presents to the ED with c/o lower back pain that started a few days ago. Patient reports being seen at another facility and was given meds but her pain is worse and she is nauseated. Mucous membranes are pink and moist. Skin is warm, dry and intact. CHRISTY FLORES  Verified patient's name and date of birth.

## 2024-03-20 ENCOUNTER — OFFICE VISIT (OUTPATIENT)
Dept: FAMILY MEDICINE | Facility: CLINIC | Age: 29
End: 2024-03-20
Payer: COMMERCIAL

## 2024-03-20 VITALS
HEIGHT: 64 IN | BODY MASS INDEX: 31.65 KG/M2 | DIASTOLIC BLOOD PRESSURE: 80 MMHG | WEIGHT: 185.38 LBS | HEART RATE: 72 BPM | OXYGEN SATURATION: 99 % | RESPIRATION RATE: 16 BRPM | SYSTOLIC BLOOD PRESSURE: 130 MMHG | TEMPERATURE: 99 F

## 2024-03-20 DIAGNOSIS — I73.00 RAYNAUD'S DISEASE WITHOUT GANGRENE: ICD-10-CM

## 2024-03-20 DIAGNOSIS — T78.2XXA ANAPHYLAXIS, INITIAL ENCOUNTER: Primary | ICD-10-CM

## 2024-03-20 DIAGNOSIS — G56.03 CARPAL TUNNEL SYNDROME ON BOTH SIDES: ICD-10-CM

## 2024-03-20 PROCEDURE — 3008F BODY MASS INDEX DOCD: CPT | Mod: CPTII,S$GLB,, | Performed by: FAMILY MEDICINE

## 2024-03-20 PROCEDURE — 3075F SYST BP GE 130 - 139MM HG: CPT | Mod: CPTII,S$GLB,, | Performed by: FAMILY MEDICINE

## 2024-03-20 PROCEDURE — 99213 OFFICE O/P EST LOW 20 MIN: CPT | Mod: S$GLB,,, | Performed by: FAMILY MEDICINE

## 2024-03-20 PROCEDURE — 3079F DIAST BP 80-89 MM HG: CPT | Mod: CPTII,S$GLB,, | Performed by: FAMILY MEDICINE

## 2024-03-20 PROCEDURE — 1159F MED LIST DOCD IN RCRD: CPT | Mod: CPTII,S$GLB,, | Performed by: FAMILY MEDICINE

## 2024-03-20 PROCEDURE — 99999 PR PBB SHADOW E&M-EST. PATIENT-LVL III: CPT | Mod: PBBFAC,,, | Performed by: FAMILY MEDICINE

## 2024-03-20 RX ORDER — EPINEPHRINE 0.3 MG/.3ML
2 INJECTION SUBCUTANEOUS ONCE
Qty: 0.6 ML | Refills: 0 | Status: SHIPPED | OUTPATIENT
Start: 2024-03-20 | End: 2024-03-20

## 2024-03-20 NOTE — PROGRESS NOTES
Subjective:       Patient ID: Albina Beckham is a 28 y.o. female.    Chief Complaint: No chief complaint on file.      Is here because of intermittent hand and foot numbness.  This occurs when she sometimes during sleep but sometimes when she is has her arms crossed in stable position.  She also needs a refill on her EpiPen.  Wt Readings from Last 3 Encounters:  03/20/24 : 84.1 kg (185 lb 6.4 oz)  01/04/24 : 81.2 kg (179 lb)  01/02/24 : 81.2 kg (179 lb)            Allergies and Medications:   Review of patient's allergies indicates:   Allergen Reactions    Ciprofloxacin Anaphylaxis    Tamiflu [oseltamivir]      Current Outpatient Medications   Medication Sig Dispense Refill    AUROVELA 1.5/30, 21, 1.5-30 mg-mcg Tab TAKE 1 TABLET BY MOUTH EVERY DAY. DO NOT. STOP TO HAVE PERIOD. TAKE CONTINUOUSLY      ibuprofen (ADVIL,MOTRIN) 800 MG tablet Take 1 tablet (800 mg total) by mouth every 8 (eight) hours as needed for Pain. 20 tablet 0    buPROPion (WELLBUTRIN SR) 150 MG TBSR 12 hr tablet Take 1 tablet (150 mg total) by mouth 2 (two) times daily. 60 tablet 11    cetirizine (ZYRTEC) 10 MG tablet Take 1 tablet (10 mg total) by mouth daily as needed for Allergies. 7 tablet 0     No current facility-administered medications for this visit.       Family History:   Family History   Problem Relation Age of Onset    Arthritis Father     Hypertension Father     Arthritis Mother     Diabetes Maternal Aunt     Diabetes Maternal Uncle     Diabetes Maternal Grandfather     Lupus Maternal Aunt     Melanoma Neg Hx     Psoriasis Neg Hx     Eczema Neg Hx        Social History:   Social History     Socioeconomic History    Marital status: Single    Number of children: 1   Occupational History    Occupation: bank teller     Comment: [employer]    Tobacco Use    Smoking status: Never    Smokeless tobacco: Never   Substance and Sexual Activity    Alcohol use: Not Currently    Drug use: No    Sexual activity: Yes     Partners: Male        Review of Systems    Objective:     Vitals:    03/20/24 1326   BP: 130/80   Pulse: 72   Resp: 16   Temp: 98.6 °F (37 °C)        Physical Exam  Vitals and nursing note reviewed.   Constitutional:       General: She is not in acute distress.     Appearance: Normal appearance. She is well-developed and normal weight. She is not ill-appearing, toxic-appearing or diaphoretic.   HENT:      Head: Normocephalic and atraumatic.   Eyes:      Pupils: Pupils are equal, round, and reactive to light.   Cardiovascular:      Rate and Rhythm: Normal rate and regular rhythm.      Heart sounds: Normal heart sounds. No murmur heard.     No friction rub. No gallop.   Pulmonary:      Effort: Pulmonary effort is normal. No respiratory distress.      Breath sounds: Normal breath sounds. No stridor. No wheezing, rhonchi or rales.   Chest:      Chest wall: No tenderness.   Musculoskeletal:      Right lower leg: No edema.      Left lower leg: No edema.      Comments: Patient had a positive Tinel's test both wrists at the carpal tunnel.  She has a history of Raynaud's phenomenon to both hands and does report some recurrence of symptoms.   Neurological:      Mental Status: She is alert.   Psychiatric:         Behavior: Behavior normal.         Thought Content: Thought content normal.         Judgment: Judgment normal.         Assessment:       No diagnosis found.    Plan:       There are no diagnoses linked to this encounter.     No follow-ups on file.

## 2024-08-28 ENCOUNTER — OFFICE VISIT (OUTPATIENT)
Dept: URGENT CARE | Facility: CLINIC | Age: 29
End: 2024-08-28
Payer: COMMERCIAL

## 2024-08-28 ENCOUNTER — TELEPHONE (OUTPATIENT)
Dept: FAMILY MEDICINE | Facility: CLINIC | Age: 29
End: 2024-08-28
Payer: COMMERCIAL

## 2024-08-28 VITALS
SYSTOLIC BLOOD PRESSURE: 116 MMHG | HEIGHT: 64 IN | WEIGHT: 178 LBS | OXYGEN SATURATION: 99 % | DIASTOLIC BLOOD PRESSURE: 77 MMHG | TEMPERATURE: 98 F | HEART RATE: 75 BPM | BODY MASS INDEX: 30.39 KG/M2 | RESPIRATION RATE: 18 BRPM

## 2024-08-28 DIAGNOSIS — R10.31 RIGHT LOWER QUADRANT ABDOMINAL PAIN: Primary | ICD-10-CM

## 2024-08-28 LAB
B-HCG UR QL: NEGATIVE
BILIRUB UR QL STRIP: NEGATIVE
CTP QC/QA: YES
GLUCOSE UR QL STRIP: NEGATIVE
KETONES UR QL STRIP: NEGATIVE
LEUKOCYTE ESTERASE UR QL STRIP: NEGATIVE
PH, POC UA: 7.5
POC BLOOD, URINE: NEGATIVE
POC NITRATES, URINE: NEGATIVE
PROT UR QL STRIP: POSITIVE
SP GR UR STRIP: 1.01 (ref 1–1.03)
UROBILINOGEN UR STRIP-ACNC: 0.2 (ref 0.1–1.1)

## 2024-08-28 PROCEDURE — 81025 URINE PREGNANCY TEST: CPT | Mod: S$GLB,,, | Performed by: STUDENT IN AN ORGANIZED HEALTH CARE EDUCATION/TRAINING PROGRAM

## 2024-08-28 PROCEDURE — 99214 OFFICE O/P EST MOD 30 MIN: CPT | Mod: S$GLB,,, | Performed by: STUDENT IN AN ORGANIZED HEALTH CARE EDUCATION/TRAINING PROGRAM

## 2024-08-28 PROCEDURE — 74019 RADEX ABDOMEN 2 VIEWS: CPT | Mod: S$GLB,,, | Performed by: RADIOLOGY

## 2024-08-28 PROCEDURE — 81003 URINALYSIS AUTO W/O SCOPE: CPT | Mod: QW,S$GLB,, | Performed by: STUDENT IN AN ORGANIZED HEALTH CARE EDUCATION/TRAINING PROGRAM

## 2024-08-28 RX ORDER — ONDANSETRON 4 MG/1
4 TABLET, ORALLY DISINTEGRATING ORAL EVERY 6 HOURS PRN
Qty: 15 TABLET | Refills: 0 | Status: SHIPPED | OUTPATIENT
Start: 2024-08-28

## 2024-08-28 NOTE — TELEPHONE ENCOUNTER
Spoke to pt. Pt states she has right side abd pain. Store bought UTI test was positive. Pt requested abx. I told her that she would need an appt to do the UA/ culture to see what abx she may need. Pt states she works til 6 everyday and is unable to come in. I advised that she may need to go to urgent care but I would message to see if you were willing to send in abx without seeing her. Please advise

## 2024-08-28 NOTE — PROGRESS NOTES
"Subjective:      Patient ID: Albina Beckham is a 28 y.o. female.    Vitals:  height is 5' 4" (1.626 m) and weight is 80.7 kg (178 lb). Her oral temperature is 98.2 °F (36.8 °C). Her blood pressure is 116/77 and her pulse is 75. Her respiration is 18 and oxygen saturation is 99%.     Chief Complaint: Abdominal Pain    Patient is a 28-year-old female with a past medical history of anxiety, depression, and RA who presents to clinic for evaluation of abdominal pain.  Patient reports symptoms began approximately 4-4.5 hours prior to arrival of clinic.  Patient reports she was sitting at work when she began to experienced pain.  Patient states pain was 1st noticed whenever she got out of a chair and moved.  Patient states felt a sharp pulling pain or stabbing pain in the right lower abdomen.  Patient states that pain did not radiate to any other location.  Patient states pain at current is rated a three on a 10 scale but as worst was a 9 on 10 scale.  Patient states that she has had intermittent nausea and diarrhea.  Patient states that she has not had any vomiting or constipation.  Patient denies any rectal bleeding.  Patient states that last oral intake before symptoms began was a spicy chicken sandwich.  Patient states that she also thought symptoms may be related to a urinary tract infection so she went in bought an over-the-counter UTI test.  Patient states it did show positive for UTI.  Patient states she attempted to contact her PCP who told her it could be more than a UTI and she needs to go to the urgent care or ER.  Patient states she has not had any abdominal surgeries.  Patient states she has taken for over-the-counter Tylenol which has significantly helped pain.  Patient states she does not feel symptoms are severe enough to be in the emergency department.  Patient states that she has not experienced any fever or chills, chest pain or shortness for breath, dysuria, urinary frequency or urgency, urinary " hesitancy or retention, flank pain or pelvic pain, vaginal discharge or bleeding, vaginal odor, back pain, headaches or dizziness, or change in mentation.    Abdominal Pain  This is a new problem. The current episode started today. The onset quality is sudden. The problem occurs intermittently. The pain is located in the RLQ. The pain is at a severity of 6/10. The quality of the pain is cramping. Associated symptoms include diarrhea and nausea. Pertinent negatives include no constipation, dysuria, fever, frequency, headaches or vomiting. The pain is aggravated by movement. She has tried acetaminophen for the symptoms. There is no history of abdominal surgery.       Constitution: Negative. Negative for chills, sweating, fatigue and fever.   HENT: Negative.     Neck: neck negative.   Cardiovascular: Negative.  Negative for chest pain and palpitations.   Eyes: Negative.    Respiratory: Negative.  Negative for chest tightness, cough and shortness of breath.    Gastrointestinal:  Positive for abdominal pain, nausea and diarrhea. Negative for history of abdominal surgery, vomiting and constipation.   Endocrine: negative.   Genitourinary: Negative.  Negative for dysuria, frequency, urgency, urine decreased, flank pain, vaginal discharge, vaginal bleeding, vaginal odor and pelvic pain.   Musculoskeletal: Negative.    Skin: Negative.  Negative for color change, pale, rash and erythema.   Allergic/Immunologic: Negative.    Neurological: Negative.  Negative for dizziness, light-headedness, passing out, headaches, disorientation and altered mental status.   Hematologic/Lymphatic: Negative.    Psychiatric/Behavioral: Negative.  Negative for altered mental status, disorientation and confusion.       Objective:     Physical Exam   Constitutional: She is oriented to person, place, and time. She appears well-developed. She is cooperative.  Non-toxic appearance. She does not appear ill. No distress.   HENT:   Head: Normocephalic and  atraumatic.   Ears:   Right Ear: Hearing and external ear normal.   Left Ear: Hearing and external ear normal.   Nose: Nose normal. No mucosal edema or nasal deformity. No epistaxis. Right sinus exhibits no maxillary sinus tenderness and no frontal sinus tenderness. Left sinus exhibits no maxillary sinus tenderness and no frontal sinus tenderness.   Mouth/Throat: Uvula is midline, oropharynx is clear and moist and mucous membranes are normal. No trismus in the jaw. Normal dentition. No uvula swelling.   Eyes: Conjunctivae and lids are normal. Pupils are equal, round, and reactive to light.   Neck: Trachea normal and phonation normal. Neck supple.   Cardiovascular: Normal rate, regular rhythm, normal heart sounds and normal pulses.   Pulmonary/Chest: Effort normal and breath sounds normal. No respiratory distress. She has no wheezes. She has no rhonchi. She has no rales.   Abdominal: Normal appearance and bowel sounds are normal. She exhibits no distension. Soft. There is abdominal tenderness. There is no rebound, no guarding, no tenderness at McBurney's point, negative Joseph's sign, no left CVA tenderness, negative Rovsing's sign, negative psoas sign, no right CVA tenderness and negative obturator sign.      Comments: Unable to reproduce any tenderness to light or deep palpation of the abdomen.   Musculoskeletal: Normal range of motion.         General: Normal range of motion.   Neurological: She is alert and oriented to person, place, and time. She exhibits normal muscle tone.   Skin: Skin is warm, dry, intact, not diaphoretic, not pale and no rash. Capillary refill takes less than 2 seconds. No erythema   Psychiatric: Her speech is normal and behavior is normal. Judgment and thought content normal.   Nursing note and vitals reviewed.      Assessment:     1. Right lower quadrant abdominal pain        Plan:       Right lower quadrant abdominal pain  -     POCT Urinalysis, Dipstick, Manual, W/O Scope  -     POCT  urine pregnancy  -     XR ABDOMEN FLAT AND ERECT; Future; Expected date: 08/28/2024                Labs: UA:  Negative  UPT:  Negative  X-ray abdomen flat and erect: The bowel gas pattern is nonobstructive.No evidence of free intraperitoneal air. No airspace consolidation at the lung bases.No acute bony abnormality. Pseudoarthrosis of the right L5 transverse process with the right sacral ala.No abnormal soft tissue masses.No abnormal calcific densities overlying the kidneys identified.   History and physical discussed with patient.  Patient continues to refused need for emergency department.  Risk versus benefits including possibility of death explained to patient.  Patient has clear verbal understanding.  AMA form signed.  See scanned document.    Take medications as prescribed.    Over-the-counter Imodium or Pepto as needed for pain/diarrhea.    Tylenol/Motrin per package instructions for any pain or fever.    Follow-up with PCP in 1 day.    Return to clinic as needed.    To ED for any continued, new come or acutely worsening symptoms.    Patient provided with stat outpatient orders for ultrasound abdomen complete and labs include CBC and CMP.  Discussed ultrasound versus CT imaging with ultrasound of the in choice.  Patient in agreement with plan of care.    DISCLAIMER: Please note that my documentation in this Electronic Healthcare Record was produced using speech recognition software and therefore may contain errors related to that software system.These could include grammar, punctuation and spelling errors or the inclusion/exclusion of phrases that were not intended. Garbled syntax, mangled pronouns, and other bizarre constructions may be attributed to that software system.

## 2024-08-28 NOTE — TELEPHONE ENCOUNTER
Called pt again. Pt states she will go to urgent care this afternoon as she is not able to come into the clinic.

## 2024-09-10 LAB
ANTIBODY SCREEN: NEGATIVE
HBV SURFACE AG SERPL QL IA: NEGATIVE
HIV 1+2 AB+HIV1 P24 AG SERPL QL IA: NORMAL
RUBELLA IMMUNE STATUS: NORMAL

## 2024-09-27 LAB
HUMAN PAPILLOMAVIRUS (HPV): POSITIVE
PAP RECOMMENDATION EXT: ABNORMAL
PAP SMEAR: ABNORMAL

## 2024-10-15 ENCOUNTER — PATIENT MESSAGE (OUTPATIENT)
Dept: FAMILY MEDICINE | Facility: CLINIC | Age: 29
End: 2024-10-15
Payer: COMMERCIAL

## 2024-10-25 ENCOUNTER — PATIENT OUTREACH (OUTPATIENT)
Dept: ADMINISTRATIVE | Facility: HOSPITAL | Age: 29
End: 2024-10-25
Payer: COMMERCIAL

## 2025-01-27 ENCOUNTER — OFFICE VISIT (OUTPATIENT)
Dept: URGENT CARE | Facility: CLINIC | Age: 30
End: 2025-01-27
Payer: MEDICAID

## 2025-01-27 VITALS
SYSTOLIC BLOOD PRESSURE: 108 MMHG | HEART RATE: 100 BPM | TEMPERATURE: 98 F | DIASTOLIC BLOOD PRESSURE: 74 MMHG | BODY MASS INDEX: 30.39 KG/M2 | HEIGHT: 64 IN | RESPIRATION RATE: 20 BRPM | WEIGHT: 178 LBS | OXYGEN SATURATION: 97 %

## 2025-01-27 DIAGNOSIS — J02.9 SORE THROAT: Primary | ICD-10-CM

## 2025-01-27 DIAGNOSIS — R05.9 COUGH, UNSPECIFIED TYPE: ICD-10-CM

## 2025-01-27 DIAGNOSIS — J06.9 VIRAL URI WITH COUGH: ICD-10-CM

## 2025-01-27 DIAGNOSIS — H66.93 ACUTE OTITIS MEDIA, BILATERAL: ICD-10-CM

## 2025-01-27 PROCEDURE — 99214 OFFICE O/P EST MOD 30 MIN: CPT | Mod: S$GLB,,, | Performed by: STUDENT IN AN ORGANIZED HEALTH CARE EDUCATION/TRAINING PROGRAM

## 2025-01-27 PROCEDURE — 87804 INFLUENZA ASSAY W/OPTIC: CPT | Mod: QW,,, | Performed by: STUDENT IN AN ORGANIZED HEALTH CARE EDUCATION/TRAINING PROGRAM

## 2025-01-27 PROCEDURE — 87811 SARS-COV-2 COVID19 W/OPTIC: CPT | Mod: QW,S$GLB,, | Performed by: STUDENT IN AN ORGANIZED HEALTH CARE EDUCATION/TRAINING PROGRAM

## 2025-01-27 PROCEDURE — 87880 STREP A ASSAY W/OPTIC: CPT | Mod: QW,,, | Performed by: STUDENT IN AN ORGANIZED HEALTH CARE EDUCATION/TRAINING PROGRAM

## 2025-01-27 RX ORDER — AMOXICILLIN 875 MG/1
875 TABLET, FILM COATED ORAL EVERY 12 HOURS
Qty: 20 TABLET | Refills: 0 | Status: SHIPPED | OUTPATIENT
Start: 2025-01-27 | End: 2025-02-06

## 2025-01-27 RX ORDER — GUAIFENESIN 100 MG/5ML
200 SOLUTION ORAL 3 TIMES DAILY PRN
Qty: 118 ML | Refills: 0 | Status: SHIPPED | OUTPATIENT
Start: 2025-01-27 | End: 2025-02-06

## 2025-01-27 NOTE — PROGRESS NOTES
"Subjective:      Patient ID: Albina Beckham is a 29 y.o. female.    Vitals:  height is 5' 4" (1.626 m) and weight is 80.7 kg (178 lb). Her temperature is 97.5 °F (36.4 °C). Her blood pressure is 108/74 and her pulse is 100. Her respiration is 20.     Chief Complaint: Cough and Sore Throat    Patient is a 29-year-old female with a past medical history of anxiety, depression, and RA who presents to clinic for evaluation of URI related symptoms.  Patient reports she is currently  2 para 1 at 23 weeks and 6 days pregnant with an RACHEL of May 20, 2025.  Patient reports she does follow with [facility].  Patient reports she has been exposed to a child sick with similar symptoms.  Patient reports she has had symptoms for about 3 days now.  Patient reports she is vaccinated for COVID however not influenza.  Patient reports no over-the-counter medicines for symptoms at this time.    Cough  This is a new problem. The current episode started in the past 7 days. The cough is Productive of sputum. Associated symptoms include chills, ear pain, headaches, myalgias, nasal congestion, postnasal drip and a sore throat. Pertinent negatives include no chest pain, rash or shortness of breath.       Constitution: Positive for chills and fatigue.   HENT:  Positive for ear pain, hearing loss, congestion, postnasal drip, sinus pressure and sore throat. Negative for ear discharge and tinnitus.    Neck: neck negative.   Cardiovascular: Negative.  Negative for chest pain and palpitations.   Eyes: Negative.    Respiratory:  Positive for cough. Negative for chest tightness, sputum production and shortness of breath.    Gastrointestinal: Negative.  Negative for abdominal pain, nausea, vomiting and diarrhea.   Endocrine: negative.   Genitourinary: Negative.  Negative for dysuria, frequency and urgency.   Musculoskeletal:  Positive for muscle ache.   Skin: Negative.  Negative for color change, pale, rash and erythema. "   Allergic/Immunologic: Negative.    Neurological:  Positive for headaches. Negative for dizziness, light-headedness, passing out, disorientation and altered mental status.   Hematologic/Lymphatic: Negative.    Psychiatric/Behavioral: Negative.  Negative for altered mental status, disorientation and confusion.       Objective:     Physical Exam   Constitutional: She is oriented to person, place, and time. She appears well-developed. She is cooperative.  Non-toxic appearance. She does not appear ill. No distress.   HENT:   Head: Normocephalic and atraumatic.   Ears:   Right Ear: External ear and ear canal normal. No no drainage, swelling or tenderness. Tympanic membrane is erythematous and bulging. Tympanic membrane is not perforated. Decreased hearing is noted. no impacted cerumen  Left Ear: External ear and ear canal normal. No no drainage, swelling or tenderness. Tympanic membrane is erythematous and bulging. Tympanic membrane is not perforated. Decreased hearing is noted. no impacted cerumen  Nose: Rhinorrhea and congestion present. No mucosal edema or nasal deformity. No epistaxis. Right sinus exhibits no maxillary sinus tenderness and no frontal sinus tenderness. Left sinus exhibits no maxillary sinus tenderness and no frontal sinus tenderness.   Mouth/Throat: Uvula is midline and mucous membranes are normal. Mucous membranes are moist. No trismus in the jaw. Normal dentition. No uvula swelling. Posterior oropharyngeal erythema present. No oropharyngeal exudate. Oropharynx is clear.   Eyes: Conjunctivae and lids are normal. Pupils are equal, round, and reactive to light. Right eye exhibits no discharge. Left eye exhibits no discharge. No scleral icterus.   Neck: Trachea normal and phonation normal. Neck supple. No neck rigidity present.   Cardiovascular: Normal rate, regular rhythm, normal heart sounds and normal pulses.   Pulmonary/Chest: Effort normal and breath sounds normal. No respiratory distress. She has  no wheezes. She has no rhonchi. She has no rales.   Abdominal: Normal appearance and bowel sounds are normal. She exhibits no distension. Soft. There is no abdominal tenderness.   Musculoskeletal: Normal range of motion.         General: Normal range of motion.      Cervical back: She exhibits no tenderness.   Lymphadenopathy:     She has no cervical adenopathy.   Neurological: She is alert and oriented to person, place, and time. She exhibits normal muscle tone.   Skin: Skin is warm, dry, intact, not diaphoretic, not pale and no rash. Capillary refill takes less than 2 seconds. No erythema   Psychiatric: Her speech is normal and behavior is normal. Judgment and thought content normal.   Nursing note and vitals reviewed.      Assessment:     1. Sore throat    2. Cough, unspecified type    3. Acute otitis media, bilateral    4. Viral URI with cough        Plan:       Sore throat  -     POCT rapid strep A    Cough, unspecified type  -     POCT Influenza A/B Rapid Antigen  -     SARS Coronavirus 2 Antigen, POCT Manual Read    Acute otitis media, bilateral    Viral URI with cough    Other orders  -     amoxicillin (AMOXIL) 875 MG tablet; Take 1 tablet (875 mg total) by mouth every 12 (twelve) hours. for 10 days  Dispense: 20 tablet; Refill: 0  -     guaiFENesin 100 mg/5 ml (ROBITUSSIN) 100 mg/5 mL syrup; Take 10 mLs (200 mg total) by mouth 3 (three) times daily as needed for Cough or Congestion.  Dispense: 118 mL; Refill: 0                Labs:  Influenza a and B negative.  COVID negative.  Rapid strep negative.  Take medications as prescribed.  Tylenol per package instructions for any pain or fever.  Assure adequate hydration and rest.  Throat lozenges or Chloraseptic per package instructions for sore throat.    Warm salt water gargles every 2-3 hours as needed for sore throat.    Nasal saline flushes or irrigation as directed for nasal saline congestion and sinus related symptoms.  Follow-up with PCP or OBGYN in 1-2  days.  Return to clinic as needed.  To ED for any new or acutely worsening symptoms.  Patient in agreement with plan of care.  Work excuse provided.    DISCLAIMER: Please note that my documentation in this Electronic Healthcare Record was produced using speech recognition software and therefore may contain errors related to that software system.These could include grammar, punctuation and spelling errors or the inclusion/exclusion of phrases that were not intended. Garbled syntax, mangled pronouns, and other bizarre constructions may be attributed to that software system.

## 2025-01-27 NOTE — LETTER
January 27, 2025      [facility]  [address]  [facility] [address]  Phone: [phone]       Patient: Albina Beckham   YOB: 1995  Date of Visit: 01/27/2025    To Whom It May Concern:    Adan Beckham  was at [facility] on 01/27/2025. The patient may return to work/school on 01/28/2025 without restrictions. If you have any questions or concerns, or if I can be of further assistance, please do not hesitate to contact me.    Sincerely,    Matteo Glover NP

## 2025-02-13 LAB — RPR: NORMAL

## 2025-04-28 LAB — T PALLIDUM AB SER QL IF: REACTIVE

## 2025-05-09 DIAGNOSIS — Z34.90 ENCOUNTER FOR INDUCTION OF LABOR: Primary | ICD-10-CM

## 2025-05-13 ENCOUNTER — HOSPITAL ENCOUNTER (INPATIENT)
Facility: HOSPITAL | Age: 30
LOS: 1 days | Discharge: HOME | End: 2025-05-14
Attending: OBSTETRICS & GYNECOLOGY | Admitting: OBSTETRICS & GYNECOLOGY
Payer: MEDICAID

## 2025-05-13 ENCOUNTER — ANESTHESIA (OUTPATIENT)
Dept: OBSTETRICS AND GYNECOLOGY | Facility: HOSPITAL | Age: 30
End: 2025-05-13
Payer: MEDICAID

## 2025-05-13 ENCOUNTER — ANESTHESIA EVENT (OUTPATIENT)
Dept: OBSTETRICS AND GYNECOLOGY | Facility: HOSPITAL | Age: 30
End: 2025-05-13
Payer: MEDICAID

## 2025-05-13 DIAGNOSIS — Z34.90 ENCOUNTER FOR INDUCTION OF LABOR: ICD-10-CM

## 2025-05-13 DIAGNOSIS — Z34.90 PREGNANCY: ICD-10-CM

## 2025-05-13 DIAGNOSIS — Z34.90 ENCOUNTER FOR ELECTIVE INDUCTION OF LABOR: ICD-10-CM

## 2025-05-13 LAB
ABSOLUTE EOSINOPHIL (SMH): 0.06 K/UL
ABSOLUTE MONOCYTE (SMH): 0.45 K/UL (ref 0.3–1)
ABSOLUTE NEUTROPHIL COUNT (SMH): 2.3 K/UL (ref 1.8–7.7)
AMPHET UR QL SCN: NEGATIVE
BARBITURATE SCN PRESENT UR: NEGATIVE
BASOPHILS # BLD AUTO: 0.03 K/UL
BASOPHILS NFR BLD AUTO: 0.6 %
BENZODIAZ UR QL SCN: NEGATIVE
BILIRUB UR QL STRIP.AUTO: NEGATIVE
BUPRENORPHINE UR QL SCN: NEGATIVE
CANNABINOIDS UR QL SCN: NEGATIVE
CLARITY UR: CLEAR
COCAINE UR QL SCN: NEGATIVE
COLOR UR AUTO: YELLOW
CREAT UR-MCNC: 48.2 MG/DL (ref 15–325)
ERYTHROCYTE [DISTWIDTH] IN BLOOD BY AUTOMATED COUNT: 14.2 % (ref 11.5–14.5)
FENTANYL UR QL SCN: NEGATIVE
GLUCOSE UR QL STRIP: NEGATIVE
GROUP & RH: NORMAL
HCT VFR BLD AUTO: 32.9 % (ref 37–48.5)
HGB BLD-MCNC: 11.1 GM/DL (ref 12–16)
HGB UR QL STRIP: NEGATIVE
IMM GRANULOCYTES # BLD AUTO: 0.02 K/UL (ref 0–0.04)
IMM GRANULOCYTES NFR BLD AUTO: 0.4 % (ref 0–0.5)
INDIRECT COOMBS: NORMAL
KETONES UR QL STRIP: NEGATIVE
LEUKOCYTE ESTERASE UR QL STRIP: ABNORMAL
LYMPHOCYTES # BLD AUTO: 2.21 K/UL (ref 1–4.8)
MCH RBC QN AUTO: 28.6 PG (ref 27–31)
MCHC RBC AUTO-ENTMCNC: 33.7 G/DL (ref 32–36)
MCV RBC AUTO: 85 FL (ref 82–98)
MICROSCOPIC COMMENT: NORMAL
NITRITE UR QL STRIP: NEGATIVE
NUCLEATED RBC (/100WBC) (SMH): 0 /100 WBC
OPIATES UR QL SCN: NEGATIVE
PCP UR QL: NEGATIVE
PH UR STRIP: 6 [PH]
PLATELET # BLD AUTO: 346 K/UL (ref 150–450)
PMV BLD AUTO: 9.8 FL (ref 9.2–12.9)
PROT UR QL STRIP: NEGATIVE
RBC # BLD AUTO: 3.88 M/UL (ref 4–5.4)
RBC #/AREA URNS AUTO: 1 /HPF
RELATIVE EOSINOPHIL (SMH): 1.2 % (ref 0–8)
RELATIVE LYMPHOCYTE (SMH): 43.3 % (ref 18–48)
RELATIVE MONOCYTE (SMH): 8.8 % (ref 4–15)
RELATIVE NEUTROPHIL (SMH): 45.7 % (ref 38–73)
SP GR UR STRIP: 1.01
SQUAMOUS #/AREA URNS AUTO: 4 /HPF
UROBILINOGEN UR STRIP-ACNC: NEGATIVE EU/DL
WBC # BLD AUTO: 5.1 K/UL (ref 3.9–12.7)
WBC #/AREA URNS AUTO: 4 /HPF

## 2025-05-13 PROCEDURE — 10907ZC DRAINAGE OF AMNIOTIC FLUID, THERAPEUTIC FROM PRODUCTS OF CONCEPTION, VIA NATURAL OR ARTIFICIAL OPENING: ICD-10-PCS | Performed by: OBSTETRICS & GYNECOLOGY

## 2025-05-13 PROCEDURE — C1751 CATH, INF, PER/CENT/MIDLINE: HCPCS | Performed by: ANESTHESIOLOGY

## 2025-05-13 PROCEDURE — 62326 NJX INTERLAMINAR LMBR/SAC: CPT | Performed by: ANESTHESIOLOGY

## 2025-05-13 PROCEDURE — 0UQMXZZ REPAIR VULVA, EXTERNAL APPROACH: ICD-10-PCS | Performed by: OBSTETRICS & GYNECOLOGY

## 2025-05-13 PROCEDURE — 81003 URINALYSIS AUTO W/O SCOPE: CPT | Performed by: OBSTETRICS & GYNECOLOGY

## 2025-05-13 PROCEDURE — 86900 BLOOD TYPING SEROLOGIC ABO: CPT | Performed by: OBSTETRICS & GYNECOLOGY

## 2025-05-13 PROCEDURE — 25000003 PHARM REV CODE 250: Performed by: OBSTETRICS & GYNECOLOGY

## 2025-05-13 PROCEDURE — 80307 DRUG TEST PRSMV CHEM ANLYZR: CPT | Performed by: OBSTETRICS & GYNECOLOGY

## 2025-05-13 PROCEDURE — 51702 INSERT TEMP BLADDER CATH: CPT

## 2025-05-13 PROCEDURE — 63600175 PHARM REV CODE 636 W HCPCS: Performed by: ANESTHESIOLOGY

## 2025-05-13 PROCEDURE — 63600175 PHARM REV CODE 636 W HCPCS: Performed by: OBSTETRICS & GYNECOLOGY

## 2025-05-13 PROCEDURE — 25000003 PHARM REV CODE 250: Performed by: ANESTHESIOLOGY

## 2025-05-13 PROCEDURE — 3E0DXGC INTRODUCTION OF OTHER THERAPEUTIC SUBSTANCE INTO MOUTH AND PHARYNX, EXTERNAL APPROACH: ICD-10-PCS | Performed by: OBSTETRICS & GYNECOLOGY

## 2025-05-13 PROCEDURE — 80354 DRUG SCREENING FENTANYL: CPT | Performed by: OBSTETRICS & GYNECOLOGY

## 2025-05-13 PROCEDURE — 27200710 HC EPIDURAL INFUSION PUMP SET: Performed by: ANESTHESIOLOGY

## 2025-05-13 PROCEDURE — 12000002 HC ACUTE/MED SURGE SEMI-PRIVATE ROOM

## 2025-05-13 PROCEDURE — 85025 COMPLETE CBC W/AUTO DIFF WBC: CPT | Performed by: OBSTETRICS & GYNECOLOGY

## 2025-05-13 PROCEDURE — 36415 COLL VENOUS BLD VENIPUNCTURE: CPT | Performed by: OBSTETRICS & GYNECOLOGY

## 2025-05-13 PROCEDURE — 72200005 HC VAGINAL DELIVERY LEVEL II

## 2025-05-13 RX ORDER — MISOPROSTOL 200 UG/1
800 TABLET ORAL ONCE AS NEEDED
Status: DISCONTINUED | OUTPATIENT
Start: 2025-05-14 | End: 2025-05-13

## 2025-05-13 RX ORDER — SODIUM CHLORIDE 0.9 % (FLUSH) 0.9 %
10 SYRINGE (ML) INJECTION
Status: DISCONTINUED | OUTPATIENT
Start: 2025-05-13 | End: 2025-05-15 | Stop reason: HOSPADM

## 2025-05-13 RX ORDER — OXYTOCIN-SODIUM CHLORIDE 0.9% IV SOLN 30 UNIT/500ML 30-0.9/5 UT/ML-%
10 SOLUTION INTRAVENOUS ONCE AS NEEDED
Status: COMPLETED | OUTPATIENT
Start: 2025-05-14 | End: 2025-05-13

## 2025-05-13 RX ORDER — ONDANSETRON 4 MG/1
8 TABLET, ORALLY DISINTEGRATING ORAL EVERY 8 HOURS PRN
Status: DISCONTINUED | OUTPATIENT
Start: 2025-05-13 | End: 2025-05-15 | Stop reason: HOSPADM

## 2025-05-13 RX ORDER — FENTANYL/BUPIVACAINE/NS/PF 2MCG/ML-.1
PLASTIC BAG, INJECTION (ML) INJECTION CONTINUOUS
Status: DISCONTINUED | OUTPATIENT
Start: 2025-05-13 | End: 2025-05-13

## 2025-05-13 RX ORDER — METHYLERGONOVINE MALEATE 0.2 MG/ML
200 INJECTION INTRAVENOUS ONCE AS NEEDED
Status: DISCONTINUED | OUTPATIENT
Start: 2025-05-14 | End: 2025-05-13

## 2025-05-13 RX ORDER — LIDOCAINE HCL/EPINEPHRINE/PF 2%-1:200K
1 VIAL (ML) INJECTION ONCE
Status: COMPLETED | OUTPATIENT
Start: 2025-05-13 | End: 2025-05-13

## 2025-05-13 RX ORDER — OXYTOCIN-SODIUM CHLORIDE 0.9% IV SOLN 30 UNIT/500ML 30-0.9/5 UT/ML-%
95 SOLUTION INTRAVENOUS ONCE AS NEEDED
Status: DISCONTINUED | OUTPATIENT
Start: 2025-05-13 | End: 2025-05-13

## 2025-05-13 RX ORDER — OXYTOCIN-SODIUM CHLORIDE 0.9% IV SOLN 30 UNIT/500ML 30-0.9/5 UT/ML-%
10 SOLUTION INTRAVENOUS ONCE AS NEEDED
Status: DISCONTINUED | OUTPATIENT
Start: 2025-05-13 | End: 2025-05-15 | Stop reason: HOSPADM

## 2025-05-13 RX ORDER — ONDANSETRON HYDROCHLORIDE 2 MG/ML
4 INJECTION, SOLUTION INTRAVENOUS EVERY 6 HOURS PRN
Status: DISCONTINUED | OUTPATIENT
Start: 2025-05-14 | End: 2025-05-13

## 2025-05-13 RX ORDER — OXYTOCIN 10 [USP'U]/ML
10 INJECTION, SOLUTION INTRAMUSCULAR; INTRAVENOUS ONCE AS NEEDED
Status: DISCONTINUED | OUTPATIENT
Start: 2025-05-13 | End: 2025-05-15 | Stop reason: HOSPADM

## 2025-05-13 RX ORDER — BUTORPHANOL TARTRATE 2 MG/ML
1 INJECTION INTRAMUSCULAR; INTRAVENOUS
Status: DISCONTINUED | OUTPATIENT
Start: 2025-05-14 | End: 2025-05-13

## 2025-05-13 RX ORDER — TRANEXAMIC ACID 10 MG/ML
1000 INJECTION, SOLUTION INTRAVENOUS EVERY 30 MIN PRN
Status: DISCONTINUED | OUTPATIENT
Start: 2025-05-13 | End: 2025-05-13

## 2025-05-13 RX ORDER — DIPHENHYDRAMINE HYDROCHLORIDE 50 MG/ML
12.5 INJECTION, SOLUTION INTRAMUSCULAR; INTRAVENOUS EVERY 4 HOURS PRN
Status: DISCONTINUED | OUTPATIENT
Start: 2025-05-13 | End: 2025-05-13

## 2025-05-13 RX ORDER — DIPHENHYDRAMINE HCL 25 MG
25 CAPSULE ORAL EVERY 4 HOURS PRN
Status: DISCONTINUED | OUTPATIENT
Start: 2025-05-13 | End: 2025-05-15 | Stop reason: HOSPADM

## 2025-05-13 RX ORDER — HYDROCORTISONE 25 MG/G
CREAM TOPICAL 3 TIMES DAILY PRN
Status: DISCONTINUED | OUTPATIENT
Start: 2025-05-13 | End: 2025-05-15 | Stop reason: HOSPADM

## 2025-05-13 RX ORDER — SODIUM CHLORIDE, SODIUM LACTATE, POTASSIUM CHLORIDE, CALCIUM CHLORIDE 600; 310; 30; 20 MG/100ML; MG/100ML; MG/100ML; MG/100ML
INJECTION, SOLUTION INTRAVENOUS CONTINUOUS
Status: DISCONTINUED | OUTPATIENT
Start: 2025-05-14 | End: 2025-05-13

## 2025-05-13 RX ORDER — DIPHENOXYLATE HYDROCHLORIDE AND ATROPINE SULFATE 2.5; .025 MG/1; MG/1
2 TABLET ORAL EVERY 6 HOURS PRN
Status: DISCONTINUED | OUTPATIENT
Start: 2025-05-14 | End: 2025-05-13

## 2025-05-13 RX ORDER — FENTANYL/BUPIVACAINE/NS/PF 2MCG/ML-.1
14 PLASTIC BAG, INJECTION (ML) INJECTION CONTINUOUS
Refills: 0 | Status: DISCONTINUED | OUTPATIENT
Start: 2025-05-13 | End: 2025-05-13

## 2025-05-13 RX ORDER — METHYLERGONOVINE MALEATE 0.2 MG/ML
200 INJECTION INTRAVENOUS ONCE AS NEEDED
Status: DISCONTINUED | OUTPATIENT
Start: 2025-05-13 | End: 2025-05-15 | Stop reason: HOSPADM

## 2025-05-13 RX ORDER — OXYTOCIN-SODIUM CHLORIDE 0.9% IV SOLN 30 UNIT/500ML 30-0.9/5 UT/ML-%
95 SOLUTION INTRAVENOUS ONCE AS NEEDED
Status: DISCONTINUED | OUTPATIENT
Start: 2025-05-13 | End: 2025-05-15 | Stop reason: HOSPADM

## 2025-05-13 RX ORDER — CALCIUM CARBONATE 200(500)MG
500 TABLET,CHEWABLE ORAL 3 TIMES DAILY PRN
Status: DISCONTINUED | OUTPATIENT
Start: 2025-05-14 | End: 2025-05-13

## 2025-05-13 RX ORDER — OXYCODONE AND ACETAMINOPHEN 5; 325 MG/1; MG/1
1 TABLET ORAL EVERY 4 HOURS PRN
Status: DISCONTINUED | OUTPATIENT
Start: 2025-05-13 | End: 2025-05-15 | Stop reason: HOSPADM

## 2025-05-13 RX ORDER — OXYTOCIN-SODIUM CHLORIDE 0.9% IV SOLN 30 UNIT/500ML 30-0.9/5 UT/ML-%
0-32 SOLUTION INTRAVENOUS CONTINUOUS
Status: DISCONTINUED | OUTPATIENT
Start: 2025-05-13 | End: 2025-05-13

## 2025-05-13 RX ORDER — PRENATAL WITH FERROUS FUM AND FOLIC ACID 3080; 920; 120; 400; 22; 1.84; 3; 20; 10; 1; 12; 200; 27; 25; 2 [IU]/1; [IU]/1; MG/1; [IU]/1; MG/1; MG/1; MG/1; MG/1; MG/1; MG/1; UG/1; MG/1; MG/1; MG/1; MG/1
1 TABLET ORAL DAILY
Status: DISCONTINUED | OUTPATIENT
Start: 2025-05-14 | End: 2025-05-15 | Stop reason: HOSPADM

## 2025-05-13 RX ORDER — DOCUSATE SODIUM 100 MG/1
200 CAPSULE, LIQUID FILLED ORAL 2 TIMES DAILY PRN
Status: DISCONTINUED | OUTPATIENT
Start: 2025-05-13 | End: 2025-05-15 | Stop reason: HOSPADM

## 2025-05-13 RX ORDER — OXYCODONE AND ACETAMINOPHEN 10; 325 MG/1; MG/1
1 TABLET ORAL EVERY 4 HOURS PRN
Status: DISCONTINUED | OUTPATIENT
Start: 2025-05-13 | End: 2025-05-15 | Stop reason: HOSPADM

## 2025-05-13 RX ORDER — ROPIVACAINE HYDROCHLORIDE 2 MG/ML
20 INJECTION, SOLUTION EPIDURAL; INFILTRATION ONCE AS NEEDED
Status: DISCONTINUED | OUTPATIENT
Start: 2025-05-13 | End: 2025-05-13

## 2025-05-13 RX ORDER — SIMETHICONE 80 MG
1 TABLET,CHEWABLE ORAL EVERY 6 HOURS PRN
Status: DISCONTINUED | OUTPATIENT
Start: 2025-05-13 | End: 2025-05-15 | Stop reason: HOSPADM

## 2025-05-13 RX ORDER — DIPHENOXYLATE HYDROCHLORIDE AND ATROPINE SULFATE 2.5; .025 MG/1; MG/1
2 TABLET ORAL EVERY 6 HOURS PRN
Status: DISCONTINUED | OUTPATIENT
Start: 2025-05-13 | End: 2025-05-15 | Stop reason: HOSPADM

## 2025-05-13 RX ORDER — OXYTOCIN-SODIUM CHLORIDE 0.9% IV SOLN 30 UNIT/500ML 30-0.9/5 UT/ML-%
95 SOLUTION INTRAVENOUS CONTINUOUS PRN
Status: DISCONTINUED | OUTPATIENT
Start: 2025-05-14 | End: 2025-05-13

## 2025-05-13 RX ORDER — TRANEXAMIC ACID 10 MG/ML
1000 INJECTION, SOLUTION INTRAVENOUS EVERY 30 MIN PRN
Status: DISCONTINUED | OUTPATIENT
Start: 2025-05-13 | End: 2025-05-15 | Stop reason: HOSPADM

## 2025-05-13 RX ORDER — MISOPROSTOL 200 UG/1
800 TABLET ORAL ONCE AS NEEDED
Status: DISCONTINUED | OUTPATIENT
Start: 2025-05-13 | End: 2025-05-13

## 2025-05-13 RX ORDER — ONDANSETRON HYDROCHLORIDE 2 MG/ML
4 INJECTION, SOLUTION INTRAVENOUS ONCE AS NEEDED
Status: DISCONTINUED | OUTPATIENT
Start: 2025-05-13 | End: 2025-05-13

## 2025-05-13 RX ORDER — ROPIVACAINE HYDROCHLORIDE 2 MG/ML
INJECTION, SOLUTION EPIDURAL; INFILTRATION
Status: COMPLETED | OUTPATIENT
Start: 2025-05-13 | End: 2025-05-13

## 2025-05-13 RX ORDER — EPHEDRINE SULFATE 50 MG/ML
5 INJECTION, SOLUTION INTRAVENOUS ONCE AS NEEDED
Status: DISCONTINUED | OUTPATIENT
Start: 2025-05-13 | End: 2025-05-13

## 2025-05-13 RX ORDER — OXYTOCIN-SODIUM CHLORIDE 0.9% IV SOLN 30 UNIT/500ML 30-0.9/5 UT/ML-%
95 SOLUTION INTRAVENOUS CONTINUOUS PRN
Status: DISCONTINUED | OUTPATIENT
Start: 2025-05-13 | End: 2025-05-15 | Stop reason: HOSPADM

## 2025-05-13 RX ORDER — CARBOPROST TROMETHAMINE 250 UG/ML
250 INJECTION, SOLUTION INTRAMUSCULAR
Status: DISCONTINUED | OUTPATIENT
Start: 2025-05-13 | End: 2025-05-15 | Stop reason: HOSPADM

## 2025-05-13 RX ORDER — MISOPROSTOL 200 UG/1
800 TABLET ORAL ONCE AS NEEDED
Status: DISCONTINUED | OUTPATIENT
Start: 2025-05-13 | End: 2025-05-15 | Stop reason: HOSPADM

## 2025-05-13 RX ORDER — CARBOPROST TROMETHAMINE 250 UG/ML
250 INJECTION, SOLUTION INTRAMUSCULAR
Status: DISCONTINUED | OUTPATIENT
Start: 2025-05-14 | End: 2025-05-13

## 2025-05-13 RX ORDER — OXYTOCIN 10 [USP'U]/ML
10 INJECTION, SOLUTION INTRAMUSCULAR; INTRAVENOUS ONCE AS NEEDED
Status: DISCONTINUED | OUTPATIENT
Start: 2025-05-14 | End: 2025-05-13

## 2025-05-13 RX ORDER — NALOXONE HCL 0.4 MG/ML
0.4 VIAL (ML) INJECTION SEE ADMIN INSTRUCTIONS
Status: DISCONTINUED | OUTPATIENT
Start: 2025-05-13 | End: 2025-05-13

## 2025-05-13 RX ADMIN — DEXTROSE MONOHYDRATE 3 MILLION UNITS: 50 INJECTION, SOLUTION INTRAVENOUS at 01:05

## 2025-05-13 RX ADMIN — OXYCODONE HYDROCHLORIDE AND ACETAMINOPHEN 1 TABLET: 10; 325 TABLET ORAL at 11:05

## 2025-05-13 RX ADMIN — Medication 2 MILLI-UNITS/MIN: at 06:05

## 2025-05-13 RX ADMIN — SODIUM CHLORIDE, POTASSIUM CHLORIDE, SODIUM LACTATE AND CALCIUM CHLORIDE: 600; 310; 30; 20 INJECTION, SOLUTION INTRAVENOUS at 12:05

## 2025-05-13 RX ADMIN — IBUPROFEN 600 MG: 200 TABLET, FILM COATED ORAL at 07:05

## 2025-05-13 RX ADMIN — SODIUM CHLORIDE, POTASSIUM CHLORIDE, SODIUM LACTATE AND CALCIUM CHLORIDE 1000 ML: 600; 310; 30; 20 INJECTION, SOLUTION INTRAVENOUS at 11:05

## 2025-05-13 RX ADMIN — LIDOCAINE HYDROCHLORIDE,EPINEPHRINE BITARTRATE 4 ML: 20; .005 INJECTION, SOLUTION EPIDURAL; INFILTRATION; INTRACAUDAL; PERINEURAL at 03:05

## 2025-05-13 RX ADMIN — BENZOCAINE AND LEVOMENTHOL: 200; 5 SPRAY TOPICAL at 07:05

## 2025-05-13 RX ADMIN — SODIUM CHLORIDE, POTASSIUM CHLORIDE, SODIUM LACTATE AND CALCIUM CHLORIDE: 600; 310; 30; 20 INJECTION, SOLUTION INTRAVENOUS at 05:05

## 2025-05-13 RX ADMIN — ROPIVACAINE HYDROCHLORIDE 10 ML: 2 INJECTION, SOLUTION EPIDURAL; INFILTRATION; PERINEURAL at 11:05

## 2025-05-13 RX ADMIN — Medication 12 ML/HR: at 12:05

## 2025-05-13 RX ADMIN — DEXTROSE MONOHYDRATE 5 MILLION UNITS: 50 INJECTION, SOLUTION INTRAVENOUS at 06:05

## 2025-05-13 RX ADMIN — OXYTOCIN-SODIUM CHLORIDE 0.9% IV SOLN 30 UNIT/500ML 10 UNITS: 30-0.9/5 SOLUTION at 03:05

## 2025-05-13 RX ADMIN — DEXTROSE MONOHYDRATE 3 MILLION UNITS: 50 INJECTION, SOLUTION INTRAVENOUS at 09:05

## 2025-05-13 RX ADMIN — ONDANSETRON 4 MG: 2 INJECTION INTRAMUSCULAR; INTRAVENOUS at 02:05

## 2025-05-13 RX ADMIN — OXYTOCIN-SODIUM CHLORIDE 0.9% IV SOLN 30 UNIT/500ML 95 MILLI-UNITS/MIN: 30-0.9/5 SOLUTION at 03:05

## 2025-05-13 NOTE — PROGRESS NOTES
[facility] hospital  Obstetrics  Labor Progress Note    Patient Name: Albina Beckham  MRN: [id number]  Admission Date: 2025  Hospital Length of Stay: 0 days  Attending Physician: Coral Nicholas MD  Primary Care Provider: Fredy Leach MD    Subjective:     Principal Problem:Encounter for elective induction of labor    Hospital Course:  No notes on file    Interval History:  Albina is a 29 y.o.  at 39w0d. She is doing well. No complaints    Objective:     Vital Signs (Most Recent):  Temp: 98.1 °F (36.7 °C) (25 1213)  Pulse: 85 (25 1212)  Resp: 16 (25 1033)  BP: 124/75 (25 1212)  SpO2: 99 % (25 1212) Vital Signs (24h Range):  Temp:  [97.8 °F (36.6 °C)-98.1 °F (36.7 °C)] 98.1 °F (36.7 °C)  Pulse:  [] 85  Resp:  [16-18] 16  SpO2:  [83 %-100 %] 99 %  BP: (103-136)/(56-82) 124/75     Weight: 97.1 kg (214 lb)  Body mass index is 36.73 kg/m².    FHT: Cat 1 (reassuring)  TOCO:  Q 2-3 minutes    Cervical Exam:  Dilation:  5  Effacement:  75  Station: -2  Presentation: Vertex     Significant Labs:  Lab Results   Component Value Date    GROUPTRH A POS 2025    HEPBSAG Negative 09/10/2024       I have personallly reviewed all pertinent lab results from the last 24 hours.    Physical Exam    Review of Systems  Assessment/Plan:     29 y.o. female  at 39w0d for:    * Encounter for elective induction of labor  IUP at 39 wga for IOL    Continue pitocin  GBBS + continue penicillin          Coral Nicholas MD  Obstetrics  Critical access hospital

## 2025-05-13 NOTE — SUBJECTIVE & OBJECTIVE
Interval History:  Albina is a 29 y.o.  at 39w0d. She is doing well. No complaints    Objective:     Vital Signs (Most Recent):  Temp: 97.8 °F (36.6 °C) (25 1033)  Pulse: 93 (25 1033)  Resp: 16 (25 1033)  BP: 103/75 (25 1033)  SpO2: 100 % (25 1033) Vital Signs (24h Range):  Temp:  [97.8 °F (36.6 °C)-98 °F (36.7 °C)] 97.8 °F (36.6 °C)  Pulse:  [74-99] 93  Resp:  [16-18] 16  SpO2:  [98 %-100 %] 100 %  BP: (103-136)/(71-82) 103/75     Weight: 97.1 kg (214 lb)  Body mass index is 36.73 kg/m².    FHT: Cat 1 (reassuring)  TOCO:  Q 3-4 minutes    Cervical Exam:  Dilation:  4  Effacement:  60  Station: -2  Presentation: Vertex     Significant Labs:  Lab Results   Component Value Date    GROUPTRH A POS 2025    HEPBSAG Negative 09/10/2024       I have personallly reviewed all pertinent lab results from the last 24 hours.    Physical Exam    Review of Systems

## 2025-05-13 NOTE — SUBJECTIVE & OBJECTIVE
Interval History:  Albina is a 29 y.o.  at 39w0d. She is doing well. No complaints    Objective:     Vital Signs (Most Recent):  Temp: 98.1 °F (36.7 °C) (25 1213)  Pulse: 85 (25 1212)  Resp: 16 (25 1033)  BP: 124/75 (25 1212)  SpO2: 99 % (25 1212) Vital Signs (24h Range):  Temp:  [97.8 °F (36.6 °C)-98.1 °F (36.7 °C)] 98.1 °F (36.7 °C)  Pulse:  [] 85  Resp:  [16-18] 16  SpO2:  [83 %-100 %] 99 %  BP: (103-136)/(56-82) 124/75     Weight: 97.1 kg (214 lb)  Body mass index is 36.73 kg/m².    FHT: Cat 1 (reassuring)  TOCO:  Q 2-3 minutes    Cervical Exam:  Dilation:  5  Effacement:  75  Station: -2  Presentation: Vertex     Significant Labs:  Lab Results   Component Value Date    GROUPTRH A POS 2025    HEPBSAG Negative 09/10/2024       I have personallly reviewed all pertinent lab results from the last 24 hours.    Physical Exam    Review of Systems

## 2025-05-13 NOTE — ANESTHESIA PREPROCEDURE EVALUATION
05/13/2025  Albina Beckham is a 29 y.o., female.      Pre-op Assessment    I have reviewed the Patient Summary Reports.     I have reviewed the Nursing Notes. I have reviewed the NPO Status.   I have reviewed the Medications.     Review of Systems  Anesthesia Hx:  No problems with previous Anesthesia   Neg history of prior surgery.          Denies Family Hx of Anesthesia complications.    Denies Personal Hx of Anesthesia complications.                    Social:  Non-Smoker, No Alcohol Use       Hematology/Oncology:  Hematology Normal   Oncology Normal                                   EENT/Dental:  EENT/Dental Normal           Cardiovascular:  Cardiovascular Normal                                              Pulmonary:  Pulmonary Normal                       Renal/:  Renal/ Normal                 Hepatic/GI:  Hepatic/GI Normal                    Musculoskeletal:  Arthritis               Neurological:  Neurology Normal                                      Endocrine:  Endocrine Normal            Dermatological:  Skin Normal    Psych:  Psychiatric History anxiety depression                Physical Exam  General: Well nourished and Alert    Airway:  Mallampati: II   Mouth Opening: Normal  TM Distance: Normal  Tongue: Normal  Neck ROM: Normal ROM    Chest/Lungs:  Clear to auscultation, Normal Respiratory Rate    Heart:  Rate: Normal  Rhythm: Regular Rhythm  Sounds: Normal        Anesthesia Plan  Type of Anesthesia, risks & benefits discussed:    Anesthesia Type: Epidural  Intra-op Monitoring Plan: Standard ASA Monitors  Informed Consent: Informed consent signed with the Patient and all parties understand the risks and agree with anesthesia plan.  All questions answered.   ASA Score: 2    Ready For Surgery From Anesthesia Perspective.     .

## 2025-05-13 NOTE — LETTER
May 14, 2025         [place] [address]  Phone: [phone]  Fax: [phone]       Date of Visit: 05/14/2025    To Whom It May Concern:    Please be advised that under state and federal laws as it relates to patient privacy and Health Insurance Accountability Act (HIPAA), we can not release our patient(s) name without authorization. Although, we can confirm that the individual listed below did accompany a person to our facility for healthcare services to be provided.    This document confirms that Allen Coronado accompanied a patient to our facility from 5/13/2025-5/16/25.    Sincerely,     Felipa Uriarte RN

## 2025-05-13 NOTE — PLAN OF CARE
Problem: Labor  Goal: Hemostasis  5/13/2025 1857 by Donna Felix RN  Outcome: Met  5/13/2025 0716 by Donna Felix RN  Outcome: Progressing  Goal: Stable Fetal Wellbeing  5/13/2025 1857 by Donna Felix RN  Outcome: Met  5/13/2025 0716 by Donna Felix RN  Outcome: Progressing  Goal: Effective Progression to Delivery  5/13/2025 1857 by Donna Felix RN  Outcome: Met  5/13/2025 0716 by Donna Felix RN  Outcome: Progressing  Goal: Absence of Infection Signs and Symptoms  5/13/2025 1857 by Donna Felix RN  Outcome: Met  5/13/2025 0716 by Donna Felix RN  Outcome: Progressing  Goal: Acceptable Pain Control  5/13/2025 1857 by Donna Felix RN  Outcome: Met  5/13/2025 0716 by Donna Felix RN  Outcome: Progressing  Goal: Normal Uterine Contraction Pattern  5/13/2025 1857 by Donna Felix, RN  Outcome: Met  5/13/2025 0716 by Donna Felix RN  Outcome: Progressing

## 2025-05-13 NOTE — L&D DELIVERY NOTE
[facility] hospital  Vaginal Delivery   Operative Note    SUMMARY     Normal spontaneous vaginal delivery of live infant, The patient began pushing at c/c/+1.  After 5 mins of pushing, infant delivered in OA position.  Shoulders/body followed easily.  Infant placed on patient's abdomen with nursery nurse present.  Cord clamped and cut after cessation of pulsations.  Placenta S/S/I.  Patient given IV pitocin.  Laceration repaired with 2- O vicryl - small left periurethral  Uterus firm below umbilicus.  Sponge, lap, needle counts correct.  The patient tolerated well.       Infant - VMI, apgars 8/9  Epidural adequate  EBL <100ml    Indications: Encounter for elective induction of labor  Pregnancy complicated by: Problem List[1]  Admitting GA: 39w0d    Delivery Information for Desean Beckham    Birth information:  YOB: 2024   Time of birth: 3:21 PM   Sex: male   Head Delivery Date/Time:     Delivery type:    Gestational Age: 39w0d       Delivery Providers    Delivering clinician:            Measurements    Weight:   Length:          Apgars    Living status:   Apgar Component Scores:  1 min.:  5 min.:  10 min.:  15 min.:  20 min.:    Skin color:         Heart rate:         Reflex irritability:         Muscle tone:         Respiratory effort:         Total:                                  Interventions/Resuscitation           Cord    No data filed       Placenta    Placenta delivery date/time:   Placenta removal:            Labor Events:       labor:       Labor Onset Date/Time:         Dilation Complete Date/Time:         Start Pushing Date/Time:         Start Pushing Date/Time:       Rupture Date/Time: 25 1030        Rupture type: ARM (Artificial Rupture)        Fluid Amount:       Fluid Color: Clear              steroids:       Antibiotics given for GBS:       Induction:       Indications for induction:        Augmentation:       Indications for augmentation:       Labor  complications:       Additional complications:          Cervical ripening:                     Delivery:      Episiotomy: None     Indication for Episiotomy:       Perineal Lacerations: None Repaired:      Periurethral Laceration: left Repaired: Yes   Labial Laceration:   Repaired:     Sulcus Laceration:   Repaired:     Vaginal Laceration:   Repaired:     Cervical Laceration:   Repaired:     Repair suture:       Repair # of packets: 1     Last Value - EBL - Nursing (mL):       Sum - EBL - Nursing (mL): 0     Last Value - EBL - Anesthesia (mL):      Calculated QBL (mL): 25     Running total QBL (mL): 25     Vaginal Sweep Performed: Yes     Surgicount Correct: Yes       Other providers:            Details (if applicable):  Trial of Labor      Categorization:      Priority:     Indications for :     Incision Type:       Additional  information:  Forceps:    Vacuum:    Breech:    Observed anomalies    Other (Comments):              [1]   Patient Active Problem List  Diagnosis    Obesity (BMI 30.0-34.9)    Generalized anxiety disorder    Current moderate episode of major depressive disorder without prior episode    Rheumatoid arthritis    Chronic fatigue    BMI 34.0-34.9,adult    IUGR (intrauterine growth restriction) affecting care of mother, third trimester, fetus 1    IUGR,     IUGR (intrauterine growth restriction) affecting care of mother    Intrauterine growth restriction (IUGR) affecting care of mother    Intrauterine growth restriction affecting antepartum care of mother    Admitted to labor and delivery    Raynaud's disease    Cervical strain    Patellar dislocation, left, subsequent encounter    Spasm of cervical paraspinous muscle    Immunization due    Decreased range of motion of knee    Decreased strength of lower extremity    Encounter for elective induction of labor

## 2025-05-13 NOTE — PROGRESS NOTES
[facility]  Obstetrics  Labor Progress Note    Patient Name: Albina Beckham  MRN: [id number]  Admission Date: 2025  Hospital Length of Stay: 0 days  Attending Physician: Coral Nicholas MD  Primary Care Provider: Fredy Leach MD    Subjective:     Principal Problem:Encounter for elective induction of labor    Hospital Course:  No notes on file    Interval History:  Albina is a 29 y.o.  at 39w0d. She is doing well. No complaints    Objective:     Vital Signs (Most Recent):  Temp: 97.8 °F (36.6 °C) (25 1033)  Pulse: 93 (25 1033)  Resp: 16 (25 1033)  BP: 103/75 (25 1033)  SpO2: 100 % (25 1033) Vital Signs (24h Range):  Temp:  [97.8 °F (36.6 °C)-98 °F (36.7 °C)] 97.8 °F (36.6 °C)  Pulse:  [74-99] 93  Resp:  [16-18] 16  SpO2:  [98 %-100 %] 100 %  BP: (103-136)/(71-82) 103/75     Weight: 97.1 kg (214 lb)  Body mass index is 36.73 kg/m².    FHT: Cat 1 (reassuring)  TOCO:  Q 3-4 minutes    Cervical Exam:  Dilation:  4  Effacement:  60  Station: -2  Presentation: Vertex     Significant Labs:  Lab Results   Component Value Date    GROUPTRH A POS 2025    HEPBSAG Negative 09/10/2024       I have personallly reviewed all pertinent lab results from the last 24 hours.    Physical Exam    Review of Systems  Assessment/Plan:     29 y.o. female  at 39w0d for:    * Encounter for elective induction of labor  IUP at 39 wga for IOL    AROm - clear  Continue pitocin  GBBS + continue penicillin          Coral Nicholas MD  Obstetrics  [facility]

## 2025-05-13 NOTE — ANESTHESIA PROCEDURE NOTES
Epidural    Patient location during procedure: OB   Reason for block: primary anesthetic   Reason for block: labor analgesia requested by patient and obstetrician  Diagnosis: Intrauterine pregnancy   Start time: 5/13/2025 11:45 AM  Timeout: 5/13/2025 11:45 AM  End time: 5/13/2025 11:55 AM    Staffing  Performing Provider: Med Roca Jr., MD  Authorizing Provider: Med Roca Jr., MD    Staffing  Performed by: Med Roca Jr., MD  Authorized by: Med Roca Jr., MD        Preanesthetic Checklist  Completed: patient identified, IV checked, site marked, risks and benefits discussed, surgical consent, monitors and equipment checked, pre-op evaluation, timeout performed, anesthesia consent given, hand hygiene performed and patient being monitored  Preparation  Patient position: sitting  Prep: Betadine and ChloraPrep  Patient monitoring: ECG and Blood Pressure  Reason for block: primary anesthetic   Epidural  Skin Anesthetic: lidocaine 1%  Skin Wheal: 3 mL  Administration type: continuous  Approach: midline  Interspace: L3-4    Injection technique: JULIO CÉSAR air  Needle and Epidural Catheter  Needle type: Tuohy   Needle gauge: 17  Needle length: 3.5 inches  Catheter type: springwound and multi-orifice  Catheter size: 19 G  Insertion Attempts: 1  Test dose: 3 mL of lidocaine 1.5% with Epi 1-to-200,000  Additional Documentation: incremental injection, negative aspiration for heme and CSF, no paresthesia on injection, no significant pain on injection, no significant complaints from patient and no signs/symptoms of IV or SA injection  Needle localization: anatomical landmarks  Assessment  Upper dermatomal levels - Left: T6  Right: T6   Dermatomal levels determined by pinch or prick  Ease of block: easy  Patient's tolerance of the procedure: no complaints and comfortable throughout block No inadvertent dural puncture with Tuohy.  Dural puncture not performed with spinal  needle    Medications:    Medications: ropivacaine (NAROPIN) solution 0.2% - Epidural   10 mL - 5/13/2025 11:52:00 AM

## 2025-05-14 ENCOUNTER — PATIENT MESSAGE (OUTPATIENT)
Dept: OBSTETRICS AND GYNECOLOGY | Facility: HOSPITAL | Age: 30
End: 2025-05-14

## 2025-05-14 VITALS
TEMPERATURE: 98 F | HEIGHT: 64 IN | SYSTOLIC BLOOD PRESSURE: 99 MMHG | HEART RATE: 80 BPM | DIASTOLIC BLOOD PRESSURE: 63 MMHG | OXYGEN SATURATION: 100 % | RESPIRATION RATE: 18 BRPM | BODY MASS INDEX: 36.54 KG/M2 | WEIGHT: 214 LBS

## 2025-05-14 PROBLEM — Z22.330 GBS CARRIER: Status: ACTIVE | Noted: 2025-05-14

## 2025-05-14 LAB
ABSOLUTE EOSINOPHIL (SMH): 0.08 K/UL
ABSOLUTE MONOCYTE (SMH): 1 K/UL (ref 0.3–1)
ABSOLUTE NEUTROPHIL COUNT (SMH): 7.1 K/UL (ref 1.8–7.7)
BASOPHILS # BLD AUTO: 0.03 K/UL
BASOPHILS NFR BLD AUTO: 0.3 %
ERYTHROCYTE [DISTWIDTH] IN BLOOD BY AUTOMATED COUNT: 14.4 % (ref 11.5–14.5)
HCT VFR BLD AUTO: 29.2 % (ref 37–48.5)
HGB BLD-MCNC: 9.7 GM/DL (ref 12–16)
IMM GRANULOCYTES # BLD AUTO: 0.05 K/UL (ref 0–0.04)
IMM GRANULOCYTES NFR BLD AUTO: 0.5 % (ref 0–0.5)
LYMPHOCYTES # BLD AUTO: 2.61 K/UL (ref 1–4.8)
MCH RBC QN AUTO: 28.3 PG (ref 27–31)
MCHC RBC AUTO-ENTMCNC: 33.2 G/DL (ref 32–36)
MCV RBC AUTO: 85 FL (ref 82–98)
NUCLEATED RBC (/100WBC) (SMH): 0 /100 WBC
PLATELET # BLD AUTO: 311 K/UL (ref 150–450)
PMV BLD AUTO: 9.8 FL (ref 9.2–12.9)
RBC # BLD AUTO: 3.43 M/UL (ref 4–5.4)
RELATIVE EOSINOPHIL (SMH): 0.7 % (ref 0–8)
RELATIVE LYMPHOCYTE (SMH): 24.1 % (ref 18–48)
RELATIVE MONOCYTE (SMH): 9.2 % (ref 4–15)
RELATIVE NEUTROPHIL (SMH): 65.2 % (ref 38–73)
WBC # BLD AUTO: 10.82 K/UL (ref 3.9–12.7)

## 2025-05-14 PROCEDURE — 25000003 PHARM REV CODE 250: Performed by: OBSTETRICS & GYNECOLOGY

## 2025-05-14 PROCEDURE — 85025 COMPLETE CBC W/AUTO DIFF WBC: CPT | Performed by: OBSTETRICS & GYNECOLOGY

## 2025-05-14 PROCEDURE — 36415 COLL VENOUS BLD VENIPUNCTURE: CPT | Performed by: OBSTETRICS & GYNECOLOGY

## 2025-05-14 PROCEDURE — 12000002 HC ACUTE/MED SURGE SEMI-PRIVATE ROOM

## 2025-05-14 RX ORDER — OXYCODONE AND ACETAMINOPHEN 5; 325 MG/1; MG/1
1 TABLET ORAL EVERY 4 HOURS PRN
Qty: 10 TABLET | Refills: 0 | Status: SHIPPED | OUTPATIENT
Start: 2025-05-14

## 2025-05-14 RX ORDER — PRENATAL WITH FERROUS FUM AND FOLIC ACID 3080; 920; 120; 400; 22; 1.84; 3; 20; 10; 1; 12; 200; 27; 25; 2 [IU]/1; [IU]/1; MG/1; [IU]/1; MG/1; MG/1; MG/1; MG/1; MG/1; MG/1; UG/1; MG/1; MG/1; MG/1; MG/1
1 TABLET ORAL DAILY
Start: 2025-05-14 | End: 2026-05-14

## 2025-05-14 RX ORDER — IBUPROFEN 600 MG/1
600 TABLET, FILM COATED ORAL EVERY 6 HOURS PRN
Qty: 60 TABLET | Refills: 0 | Status: SHIPPED | OUTPATIENT
Start: 2025-05-14

## 2025-05-14 RX ADMIN — OXYCODONE HYDROCHLORIDE AND ACETAMINOPHEN 1 TABLET: 10; 325 TABLET ORAL at 08:05

## 2025-05-14 RX ADMIN — OXYCODONE HYDROCHLORIDE AND ACETAMINOPHEN 1 TABLET: 10; 325 TABLET ORAL at 04:05

## 2025-05-14 RX ADMIN — PRENATAL VIT W/ FE FUMARATE-FA TAB 27-0.8 MG 1 TABLET: 27-0.8 TAB at 08:05

## 2025-05-14 RX ADMIN — IBUPROFEN 600 MG: 200 TABLET, FILM COATED ORAL at 04:05

## 2025-05-14 RX ADMIN — DOCUSATE SODIUM 200 MG: 100 CAPSULE, LIQUID FILLED ORAL at 08:05

## 2025-05-14 NOTE — DISCHARGE SUMMARY
"[facility]  Obstetrics  Discharge Summary      Patient Name: Albina Beckham  MRN: [id number]  Admission Date: 2025  Hospital Length of Stay: 1 days  Discharge Date and Time: 2025 1530  Attending Physician: Coral Nicholas MD   Discharging Provider: Clare Jacome CNM   Primary Care Provider: Fredy Leach MD    HPI: No notes on file        * No surgery found *     Hospital Course:   S/P vag delivery         Final Active Diagnoses:    Diagnosis Date Noted POA    PRINCIPAL PROBLEM:   (spontaneous vaginal delivery) [O80] 2025 Not Applicable    Liveborn, born in hospital [Z38.00] 2025 Yes    Periurethral laceration, delivered, current hospitalization [O71.82] 2025 Unknown    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] 2025 Not Applicable      Problems Resolved During this Admission:        Significant Diagnostic Studies: Labs: All labs within the past 24 hours have been reviewed  Lab Results   Component Value Date    GROUPTRH A POS 2025         Feeding Method: exclusively pumping    Immunizations       Date Immunization Status Dose Route/Site Given by    25 MMR Incomplete 0.5 mL Subcutaneous/     25 Tdap Incomplete 0.5 mL Intramuscular/             Delivery:    Episiotomy: None   Lacerations: None   Repair suture:     Repair # of packets: 1   Blood loss (ml):       Birth information:  YOB: 2024   Time of birth: 3:21 PM   Sex: male   Delivery type: Vaginal, Spontaneous   Gestational Age: 39w0d     Measurements    Weight: 3390 g  Weight (lbs): 7 lb 7.6 oz  Length: 49 cm  Length (in): 19.29"  Head circumference: 33 cm  Chest circumference: 32 cm  Abdominal girth: 32.5 cm         Delivery Clinician: Delivery Providers    Delivering clinician: Coral Nicholas MD   Provider Role    Donna Felix RN Delivery Nurse    Dinah Mata ST Scrub Person    Nella Brooks RN Pediatric Nursery    Christine Preston RN Registered Nurse "             Additional  information:  Forceps:    Vacuum:    Breech:    Observed anomalies      Living?:     Apgars    Living status: Living  Apgar Component Scores:  1 min.:  5 min.:  10 min.:  15 min.:  20 min.:    Skin color:  1  1       Heart rate:  2  2       Reflex irritability:  1  2       Muscle tone:  2  2       Respiratory effort:  2  2       Total:  8  9       Apgars assigned by: NINI MARTINEZ RN         Placenta: Delivered:       appearance  Pending Diagnostic Studies:       Procedure Component Value Units Date/Time    Treponema Pallidium Antibodies IgG, IgM [[id number]] Collected: 25    Order Status: Sent Lab Status: In process Updated: 25    Specimen: Blood             Discharged Condition: stable    Disposition: Home or Self Care    Follow Up:   Follow-up Information       Coral Nicholas MD Follow up in 6 week(s).    Specialties: Obstetrics, Obstetrics and Gynecology  Contact information:  [address]  [address]  [facility]  [place] [address]  [phone]                           Patient Instructions:      Glucose, fasting   Standing Status: Future Standing Exp. Date: 26   Order Comments: Schedule 6 weeks after delivery     Order Specific Question Answer Comments   Send normal result to authorizing provider's In Basket if patient is active on MyChart: Yes      Glucose Tolerance, 2 Hours   Standing Status: Future Standing Exp. Date: 26   Order Comments: Schedule 6 weeks after delivery     Order Specific Question Answer Comments   Send normal result to authorizing provider's In Basket if patient is active on MyChart: Yes      Diet Adult Regular     Lifting restrictions     No driving until:     Pelvic Rest     Notify your health care provider if you experience any of the following:  temperature >100.4     Notify your health care provider if you experience any of the following:  persistent nausea and vomiting or diarrhea     Notify your  health care provider if you experience any of the following:  severe uncontrolled pain     Notify your health care provider if you experience any of the following:  redness, tenderness, or signs of infection (pain, swelling, redness, odor or green/yellow discharge around incision site)     Notify your health care provider if you experience any of the following:  difficulty breathing or increased cough     Notify your health care provider if you experience any of the following:  severe persistent headache     Notify your health care provider if you experience any of the following:  worsening rash     Notify your health care provider if you experience any of the following:  persistent dizziness, light-headedness, or visual disturbances     Notify your health care provider if you experience any of the following:  increased confusion or weakness     Notify your health care provider if you experience any of the following:   Order Comments: Headaches unrelieved by tylenol, vision changes, pain in the upper right side of your abdomen.     Activity as tolerated     Medications:  Current Discharge Medication List        START taking these medications    Details   ibuprofen (ADVIL,MOTRIN) 600 MG tablet Take 1 tablet (600 mg total) by mouth every 6 (six) hours as needed (cramping).  Qty: 60 tablet, Refills: 0      oxyCODONE-acetaminophen (PERCOCET) 5-325 mg per tablet Take 1 tablet by mouth every 4 (four) hours as needed for Pain.  Qty: 10 tablet, Refills: 0    Comments: Quantity prescribed more than 7 day supply? No  Associated Diagnoses:  (spontaneous vaginal delivery)      PNV,calcium 72/iron/folic acid (PRENATAL VITAMIN) Tab Take 1 tablet by mouth once daily.           CONTINUE these medications which have NOT CHANGED    Details   cetirizine (ZYRTEC) 10 MG tablet Take 1 tablet (10 mg total) by mouth daily as needed for Allergies.  Qty: 7 tablet, Refills: 0    Associated Diagnoses: Viral syndrome           STOP taking  these medications       EPINEPHrine (EPIPEN) 0.3 mg/0.3 mL AtIn Comments:   Reason for Stopping:         ondansetron (ZOFRAN-ODT) 4 MG TbDL Comments:   Reason for Stopping:               Clare Jacome CNM  Obstetrics  [facility]

## 2025-05-14 NOTE — NURSING TRANSFER
Nursing Transfer Note      5/13/2025   9:14 PM    Nurse giving handoff:Allison  Nurse receiving handoff:Damaris    Reason patient is being transferred: Delivered    Transfer To: 2109    Transfer via wheelchair    Transfer with infant    Transported by DAMARIS Rashid, VASU    Transfer Vital Signs:  Blood Pressure:145/78  Heart Rate:86  O2:98  Temperature:97.9  Respirations:16    Order for Tele Monitor? No    Medicines sent: Dermoplast    Any special needs or follow-up needed: Fundal height, vaginal bleeding, pain    Patient belongings transferred with patient: Yes    Chart send with patient: Yes    Notified: spouse    Patient reassessed at: 5/13/25 @ 2107 (date, time)  1  Upon arrival to floor: patient oriented to room, call bell in reach, and bed in lowest position

## 2025-05-14 NOTE — ASSESSMENT & PLAN NOTE
S/P IOL at 39 wga  PPD #1 doing well no complaints  Pain well controlled  Pumping  + flatus  FF 1 below Normal lochia  Desires discharge today - Discharge and follow up instructions discussed

## 2025-05-14 NOTE — PLAN OF CARE
Formerly [facility], later [facility]  Discharge Assessment    Primary Care Provider: Fredy Leach MD     OB Screen completed using health record.  No needs anticipated at this time, and no consult(s) noted.    OB Screen (most recent)       OB Screen - 25 1050          OB SCREEN    Assessment Type Discharge Planning Assessment     Source of Information health record     Received Prenatal Care Yes     Any indications/suspicions for None     Is this a teen pregnancy No     Is the baby in NICU No     Indication for adoption/[facility] No     Indication for DME/post-acute needs No     HIV (+) No     Any congenital  disorders No     Fetal demise/ death No

## 2025-05-14 NOTE — LACTATION NOTE
25 0950   Maternal Assessment   Breast Density Bilateral:;soft   Areola Bilateral:;elastic   Nipples Bilateral:;everted   Equipment Type   Breast Pump Type double electric, personal   Breast Pumping   Breast Pumping Interventions frequent pumping encouraged     Patient plans to exclusively pump and bottle feed EBM. Declines putting baby to breast. Discussed necessity of supplementation at this time if baby not going to breast. Offered DBM for supplementation. Mother wishes to go home today after baby 24 hours old, therefore DBM not appropriate due to inaccessibility of donor milk outside of the hospital. Patient chooses to provide available colostrum and supplement with formula until her milk comes in. Discussed risks of formula feeding. Mother verbalizes understanding of all instructions with good recall. Baby took 15 ml of similac total comfort formula at this time. Encouraged to call me for any further feeding related questions or assistance.     Instructed on the risks of formula feeding including:  Lacks the nutrients found in colostrums to help prevent infection, mature the gut, aid in digestion and resist allergies  Contains artificial additives and preservatives which increases incidence of contamination  Increase spitting up due to slower digestion  Increased cost and requires preparation, including bottle sanitation and formula refrigeration  Increased incidence of NEC for the  baby  Increased risk of diabetes with family history, SIDS and ear infections  Skipped feedings for the breastfeeding mother increases chance of engorgement, mastitis and plugged ducts  Decreases breastfeeding babys appetite resulting in poor feeding session, decreased breast stimulation and poor milk supply  Exposes the breastfeeding baby to the possibility of allergic reactions and colic  Pt states understanding and verbalized appropriate recall.

## 2025-05-14 NOTE — ANESTHESIA POSTPROCEDURE EVALUATION
Anesthesia Post Evaluation    Patient: Albina Beckham    Procedure(s) Performed: * No procedures listed *    Final Anesthesia Type: epidural      Patient location during evaluation: labor & delivery  Patient participation: Yes- Able to Participate  Level of consciousness: awake and alert and oriented  Post-procedure vital signs: reviewed and stable  Pain management: adequate  Airway patency: patent    PONV status at discharge: No PONV  Anesthetic complications: no      Cardiovascular status: blood pressure returned to baseline and hemodynamically stable  Respiratory status: spontaneous ventilation, unassisted and room air  Hydration status: euvolemic  Follow-up not needed.  Comments: Patient reports full return of motor and sensory to her lower extremities. No headache or backache. Ambulating without difficulty. No apparent anesthetic complications.                Vitals Value Taken Time   /67 05/14/25 08:10   Temp 36.6 °C (97.9 °F) 05/14/25 08:10   Pulse 85 05/14/25 08:10   Resp 18 05/14/25 08:38   SpO2 98 % 05/14/25 08:10         No case tracking events are documented in the log.      Pain/Darryl Score: Pain Rating Prior to Med Admin: 8 (5/14/2025  8:38 AM)  Pain Rating Post Med Admin: 2 (5/14/2025  5:18 AM)

## 2025-05-14 NOTE — DISCHARGE INSTRUCTIONS

## 2025-05-14 NOTE — PLAN OF CARE
05/14/25 1054   Final Note   Assessment Type Final Discharge Note   Anticipated Discharge Disposition Home   What phone number can be called within the next 1-3 days to see how you are doing after discharge? [phone]   Post-Acute Status   Discharge Delays None known at this time     Discharge orders and chart reviewed with no further post-acute discharge needs identified at this time.  At this time, patient is cleared for discharge from Case Management standpoint.

## 2025-05-14 NOTE — PLAN OF CARE
VSS. Uterus firm w/o massage, bleeding continues to improve.  Patient is ambulating independently, voiding spontaneously. Has taken a shower and met urine output expectation. Patient reported pain on level 7/10. Patient was given ibuprofen 600 mg and oxycodone 10 mg @ 0425 on 2025.   Will reassess at appropriate time. Plan of care reviewed with patient. No new concerns expressed at this time. Patient expressed desire to be discharged soon after baby's 24-hr check and circumcision. Will continue to monitor appropriately.CHRISS         Problem: Infant Inpatient Plan of Care  Goal: Plan of Care Review  Outcome: Progressing  Goal: Patient-Specific Goal (Individualized)  Outcome: Progressing  Goal: Absence of Hospital-Acquired Illness or Injury  Outcome: Progressing  Goal: Optimal Comfort and Wellbeing  Outcome: Progressing  Goal: Readiness for Transition of Care  Outcome: Progressing     Problem:   Goal: Optimal Circumcision Site Healing  Outcome: Progressing  Goal: Glucose Stability  Outcome: Progressing  Goal: Demonstration of Attachment Behaviors  Outcome: Progressing  Goal: Absence of Infection Signs and Symptoms  Outcome: Progressing  Goal: Effective Oral Intake  Outcome: Progressing  Goal: Optimal Level of Comfort and Activity  Outcome: Progressing  Goal: Effective Oxygenation and Ventilation  Outcome: Progressing  Goal: Skin Health and Integrity  Outcome: Progressing  Goal: Temperature Stability  Outcome: Progressing     Problem: Breastfeeding  Goal: Effective Breastfeeding  Outcome: Progressing

## 2025-08-20 ENCOUNTER — PATIENT MESSAGE (OUTPATIENT)
Dept: ADMINISTRATIVE | Facility: HOSPITAL | Age: 30
End: 2025-08-20
Payer: MEDICAID